# Patient Record
Sex: MALE | Race: WHITE | NOT HISPANIC OR LATINO | Employment: OTHER | URBAN - NONMETROPOLITAN AREA
[De-identification: names, ages, dates, MRNs, and addresses within clinical notes are randomized per-mention and may not be internally consistent; named-entity substitution may affect disease eponyms.]

---

## 2017-01-04 ENCOUNTER — TRANSFERRED RECORDS (OUTPATIENT)
Dept: HEALTH INFORMATION MANAGEMENT | Facility: HOSPITAL | Age: 62
End: 2017-01-04

## 2017-01-04 LAB
CHOLEST SERPL-MCNC: 154 MG/DL (ref 114–200)
HDLC SERPL-MCNC: 67 MG/DL (ref 40–60)
HEP C HIM: NORMAL
LDLC SERPL CALC-MCNC: 71 MG/DL
TRIGL SERPL-MCNC: 78 MG/DL (ref 10–200)

## 2019-08-01 ENCOUNTER — TRANSFERRED RECORDS (OUTPATIENT)
Dept: HEALTH INFORMATION MANAGEMENT | Facility: CLINIC | Age: 64
End: 2019-08-01

## 2019-10-21 PROBLEM — E66.3 OVERWEIGHT (BMI 25.0-29.9): Status: ACTIVE | Noted: 2019-04-10

## 2019-10-21 PROBLEM — N40.1 BPH ASSOCIATED WITH NOCTURIA: Status: ACTIVE | Noted: 2018-10-09

## 2019-10-21 PROBLEM — E78.5 HYPERLIPIDEMIA, UNSPECIFIED HYPERLIPIDEMIA TYPE: Status: ACTIVE | Noted: 2019-10-21

## 2019-10-21 PROBLEM — Z72.0 TOBACCO ABUSE: Status: ACTIVE | Noted: 2019-10-21

## 2019-10-21 PROBLEM — R35.1 BPH ASSOCIATED WITH NOCTURIA: Status: ACTIVE | Noted: 2018-10-09

## 2019-10-21 PROBLEM — J43.9 PULMONARY EMPHYSEMA, UNSPECIFIED EMPHYSEMA TYPE (H): Status: ACTIVE | Noted: 2019-10-21

## 2019-10-21 PROBLEM — I10 ESSENTIAL HYPERTENSION: Status: ACTIVE | Noted: 2019-10-21

## 2019-10-21 NOTE — PROGRESS NOTES
Subjective     Jaxson Ramirez is a 64 year old male who presents to clinic today for the following health issues:    HPI   New Patient/Transfer of Care  At this time, past medical history, current medications, allergies and drug sensitivities, immunizations, habits and life style, family history, and social history are reviewed and updated. Due for the influenza vaccine. Willing to get. Due for the Shingles vaccine. Would like to first check insurance coverage. Due for HIV screening. Declines. Due for a PSA. Willing to have done.     Due for aortic aneurysm screening. He has never had this and has smoked over 1 ppd times 50 years. Willing to get. No interest in quitting smoking.     Last colonoscopy in 2011 w/ 1 tubular adenoma - rec'd repeat in fall 2016. Declined, but did do FIT test which was negative. Recent did another FIT test with his insurance and notes that this was negative. He will bring in the results.     Hyperlipidemia Follow-Up      Are you having any of the following symptoms? (Select all that apply)  NONE-no chest pain, palpitaitons, dizziness, syncope, or lower leg edema. No calf pain. Some shortness of breath r/t his COPD. He notes that this has not worsened.     Are you regularly taking any medication or supplement to lower your cholesterol?   Yes- Crestor, started 2016    Are you having muscle aches or other side effects that you think could be caused by your cholesterol lowering medication?  No      Hypertension Follow-up      Do you check your blood pressure regularly outside of the clinic? No     Are you following a low salt diet? Yes no added salt     Are your blood pressures ever more than 140 on the top number (systolic) OR more   than 90 on the bottom number (diastolic), for example 140/90? Not checking blood pressure outside the clinic   -He currently takes metoprolol and losartan. Denies any side effects.  -He is drinking usually 4-6 beers a night on weekdays and on weekends often  "times up to 12 beers a night. No interest in quitting.   -He also smokes over 1 ppd. No interest in quitting.        How many servings of fruits and vegetables do you eat daily?  0-1    On average, how many sweetened beverages do you drink each day (soda, juice, sweet tea, etc)?   0    How many days per week do you miss taking your medication? Every once in a while , forgets to take       COPD - refuses to do PFT's. Using both Dulera and Breo. Was on Spiriva in the past, but he notes that insurance would no longer cover it. He has since switched to Medicare. He continues to smoke. No interest in quitting. He did have a low dose lung cancer screen in 10/2018. This was negative. Recommended annual screening. Declines today, but will most likely do it next year.     Patient notes that he has several \"blackheads\" on his back. He notes that they often itch. He has not applied anything to the area. Wonders what he can put on them to stop the itching. No fevers. No unintentional weight loss.     Patient Active Problem List   Diagnosis     Essential hypertension     Pulmonary emphysema, unspecified emphysema type (H)     Tobacco abuse     Hyperlipidemia, unspecified hyperlipidemia type     Alcohol consumption heavy     BPH associated with nocturia     Elevated fasting glucose     Overweight (BMI 25.0-29.9)     Past Surgical History:   Procedure Laterality Date     cataract right       layngoscopy  2009    layngoscopy     UVULECTOMY         Social History     Tobacco Use     Smoking status: Current Every Day Smoker     Packs/day: 1.00     Smokeless tobacco: Never Used   Substance Use Topics     Alcohol use: Yes     Comment: 6  pack per day     Family History   Problem Relation Age of Onset     Hypertension Brother      Cancer Brother         renal     Liver Cancer Mother      Parkinsonism Father      Bladder Cancer Father         smoker         Current Outpatient Medications   Medication Sig Dispense Refill     albuterol " "(PROAIR HFA/PROVENTIL HFA/VENTOLIN HFA) 108 (90 Base) MCG/ACT inhaler INHALE 2 PUFFS BY MOUTH EVERY 4 HOURS AS NEEDED FOR WHEEZING AND FOR SHORTNESS OF BREATH SHAKE BEFORE USING.  5     ASPIRIN PO Take 81 mg by mouth daily       fluticasone-vilanterol (BREO ELLIPTA) 100-25 MCG/INH inhaler Inhale 1 puff into the lungs       losartan (COZAAR) 100 MG tablet Take 100 mg by mouth       metoprolol succinate ER (TOPROL-XL) 200 MG 24 hr tablet Take 200 mg by mouth       rosuvastatin (CRESTOR) 20 MG tablet Take 20 mg by mouth       tiotropium (SPIRIVA) 18 MCG inhaled capsule Inhale 1 capsule (18 mcg) into the lungs daily 90 capsule 3     Allergies   Allergen Reactions     Ace Inhibitors      Tramadol Hcl Diarrhea     Vomiting  Ultram         Reviewed and updated as needed this visit by Provider  Tobacco  Med Hx  Surg Hx  Fam Hx         Review of Systems   As noted in the HPI.       Objective    /78 (BP Location: Left arm, Patient Position: Chair, Cuff Size: Adult Large)   Pulse 96   Temp 97.9  F (36.6  C) (Tympanic)   Ht 1.702 m (5' 7\")   Wt 74.4 kg (164 lb)   SpO2 96%   BMI 25.69 kg/m    Body mass index is 25.69 kg/m .  Physical Exam   GENERAL: healthy, alert and no distress  EYES: Eyes grossly normal to inspection, PERRL and conjunctivae and sclerae normal  HENT: ear canals and TM's normal, nose and mouth without ulcers or lesions  NECK: no adenopathy, no asymmetry, masses, or scars and thyroid normal to palpation  RESP: lungs clear to auscultation - no rales, rhonchi or wheezes  CV: regular rate and rhythm, normal S1 S2, no S3 or S4, no murmur, click or rub, no peripheral edema and peripheral pulses strong  SKIN: patient has over 30 inclusion cysts on back filled with thick black material.   PSYCH: mentation appears normal, affect normal/bright    Diagnostic Test Results:  none         Assessment & Plan   (Z76.89) Encounter to establish care  (primary encounter diagnosis)  Plan: Patient is in need of a new " provider. he has been explained the role of a CNP and the fact that I do not follow patients in the hospital. he was told that should he get admitted, he would then be followed by a hospitalist. he verbalizes understanding and would like to establish a relationship now.     (E78.5) Hyperlipidemia, unspecified hyperlipidemia type  Comment: tolerating Crestor  Plan: Comprehensive metabolic panel, Lipid Profile        Will continue. ALT/AST pending.     (I10) Essential hypertension  Plan: Well controlled. Continue current medications. Encouraged daily exercise and a low sodium diet. Recommended checking BP's 2x/wk, call the clinic if consistantly s>140 or d>90. Follow up in 6 months.       (J43.9) Pulmonary emphysema, unspecified emphysema type (H)  Comment: stable, but taking both Breo and Dulera.  Plan: tiotropium (SPIRIVA) 18 MCG inhaled capsule        Will stop the Dulera as his insurance will no longer cover it and try ordering Spiriva again as he now has medicare. F/u 4 weeks at  for reassessment.     (Z72.0) Tobacco abuse  (Z71.6) Tobacco abuse counseling  Plan: Cessation encouraged.     (Z11.4) Screening for HIV (human immunodeficiency virus)  Plan: HIV Antigen Antibody Combo        Will notify patient of the results when available and intervene accordingly.     (Z12.5) Screening for prostate cancer  Plan: PSA, screen        Will notify patient of the results when available and intervene accordingly.     (Z13.6) Screening for AAA (abdominal aortic aneurysm)  Plan: Abdominal Aortic Aneurysm Screening/Tracking,         US Abdominal Aorta Limited        Will notify patient of the results when available and intervene accordingly.     (Z87.891) History of smoking  Plan: US Abdominal Aorta Limited        Will notify patient of the results when available and intervene accordingly.     (F10.10) Alcohol abuse  Plan: Cessation encouraged. Will check LFTs at follow up. Will notify patient of the results when available  and intervene accordingly.     (Z23) Need for prophylactic vaccination and inoculation against influenza  Plan: INFLUENZA (HIGH DOSE) 3 VALENT VACCINE [36671]    (L72.0) Inclusion cyst  Comment: patient has many  Plan: Will discuss with derm and further address at .          Tobacco Cessation:   reports that he has been smoking. He has been smoking about 1.00 pack per day. He has never used smokeless tobacco.  Tobacco Cessation Action Plan: Information offered: Patient not interested at this time      Annette William NP  [facility] - [place]

## 2019-10-21 NOTE — PATIENT INSTRUCTIONS
HOW TO QUIT SMOKING  Smoking is one of the hardest habits to break. About half of all those who have ever smoked have been able to quit, and most of those (about 70%) who still smoke want to quit. Here are some of the best ways to stop smoking.     KEEP TRYING:  It takes most smokers about 8 tries before they are finally able to fully quit. So, the more often you try and fail, the better your chance of quitting the next time! So, don't give up!    GO COLD TURKEY:  Most ex-smokers quit cold turkey. Trying to cut back gradually doesn't seem to work as well, perhaps because it continues the smoking habit. Also, it is possible to fool yourself by inhaling more while smoking fewer cigarettes. This results in the same amount of nicotine in your body!    GET SUPPORT:  Support programs can make an important difference, especially for the heavy smoker. These groups offer lectures, methods to change your behavior and peer support. Call the free national Quitline for more information. 800-QUIT-NOW (121-451-6432). Low-cost or free programs are offered by many hospitals, local chapters of the American Lung Association (940-850-2157) and the American Cancer Society (510-499-3909). Support at home is important too. Non-smokers can help by offering praise and encouragement. If the smoker fails to quit, encourage them to try again!    OVER-THE-COUNTER MEDICINES:  For those who can't quit on their own, Nicotine Replacement Therapy (NRT) may make quitting much easier. Certain aids such as the nicotine patch, gum and lozenge are available without a prescription. However, it is best to use these under the guidance of your doctor. The skin patch provides a steady supply of nicotine to the body. Nicotine gum and lozenge gives temporary bursts of low levels of nicotine. Both methods take the edge off the craving for cigarettes. WARNING: If you feel symptoms of nicotine overdose, such as nausea, vomiting, dizziness, weakness, or fast  heartbeat, stop using these and see your doctor.    PRESCRIPTION MEDICINES:  After evaluating your smoking patterns and prior attempts at quitting, your doctor may offer a prescription medicine such as bupropion (Zyban, Wellbutrin), varenicline (Chantix, Champix), a niocotine inhaler or nasal spray. Each has its unique advantage and side effects which your doctor can review with you.    HEALTH BENEFITS OF QUITTING:  The benefits of quitting start right away and keep improving the longer you go without smokin minutes: blood pressure and pulse return to normal  8 hours: oxygen levels return to normal  2 days: ability to smell and taste begins to improve as damaged nerves start to regrow  2-3 weeks: circulation and lung function improves  1-9 months: decreased cough, congestion and shortness of breath; less tired  1 year: risk of heart attack decreases by half  5 years: risk of lung cancer decreases by half; risk of stroke becomes the same as a non-smoker  For information about how to quit smoking, visit the following links:  National [employer] [place] ,   Clearing the Air, Quit Smoking Today   - an online booklet. http://www.smokefree.gov/pubs/clearing_the_air.pdf  Smokefree.gov http://smokefree.gov/  QuitNet http://www.quitnet.com/    6446-7651 Cherelle Hudson, [address] [place] [address], [place] [address]. All rights reserved. This information is not intended as a substitute for professional medical care. Always follow your healthcare professional's instructions.    The Benefits of Living Smoke Free  What do you want to gain from quitting? Check off some reasons to quit.  Health Benefits  ___ Reduce my risk of lung cancer, heart disease, chronic lung disease  ___ Have fewer wrinkles and softer skin  ___ Improve my sense of taste and smell  ___ For pregnant women--reduce the risk of having a miscarriage, stillbirth, premature birth, or low-birth-weight baby  Personal Benefits  ___ Feel more in control of my  life  ___ Have better-smelling hair, breath, clothes, home, and car  ___ Save time by not having to take smoke breaks, buy cigarettes, or hunt for a light  ___ Have whiter teeth  Family Benefits  ___ Reduce my children s respiratory tract infections  ___ Set a good example for my children  ___ Reduce my family s cancer risk  Financial Benefits  ___ Save hundreds of dollars each year that would be spent on cigarettes  ___ Save money on medical bills  ___ Save on life, health, and car insurance premiums    Those Dollars Add Up!  Cigarettes are expensive, and getting more expensive all the time. Do you realize how much money you are spending on cigarettes per year? What is the average amount you spend on a pack of cigarettes? What is the average number of packs that you smoke per day? Using your answers to these questions, fill in this formula to help you find out:  ($ _____ per pack) ×  ( _____ number of packs per day) × (365 days) =  $ _____ yearly cost of smoking  Besides tobacco, there are other costs, including extra cleaning bills and replacement costs for clothing and furniture; medical expenses for smoking-related illnesses; and higher health, life, and car insurance premiums.    Cigars and Pipes Count Too!  Cigars and pipes are also dangerous. So are smokeless (chewing) tobacco and snuff. All of these products contain nicotine, a highly addictive substance that has harmful effects on your body. Quitting smoking means giving up all tobacco products.      6734-3699 [address], [place] [address]. All rights reserved. This information is not intended as a substitute for professional medical care. Always follow your healthcare professional's instructions.  Lung Cancer Screening   Frequently Asked Questions  If you are at high-risk for lung cancer, getting screened with low-dose computed tomography (LDCT) every year can help save your life. This handout offers answers to some of the most  common questions about lung cancer screening. If you have other questions, please call [phone] ([phone]).     What is it?  Lung cancer screening uses special X-ray technology to create an image of your lung tissue. The exam is quick and easy and takes less than 10 seconds. We don t give you any medicine or use any needles. You can eat before and after the exam. You don t need to change your clothes as long as the clothing on your chest doesn t contain metal. But, you do need to be able to hold your breath for at least 6 seconds during the exam.    What is the goal of lung cancer screening?  The goal of lung cancer screening is to save lives. Many times, lung cancer is not found until a person starts having physical symptoms. Lung cancer screening can help detect lung cancer in the earliest stages when it may be easier to treat.    Who should be screened for lung cancer?  We suggest lung cancer screening for anyone who is at high-risk for lung cancer. You are in the high-risk group if you:      are between the ages of 55 and 79, and    have smoked at least 1 pack of cigarettes a day for 30 or more years, and    still smoke or have quit within the past 15 years.    However, if you have a new cough or shortness of breath, you should talk to your doctor before being screened.    Some national lung health advocacy groups also recommend screening for people ages 50 to 79 who have smoked an average of 1 pack of cigarettes a day for 20 years. They must also have at least 1 other risk factor for lung cancer, not including exposure to secondhand smoke. Other risk factors are having had cancer in the past, emphysema, pulmonary fibrosis, COPD, a family history of lung cancer, or exposure to certain materials such as arsenic, asbestos, beryllium, cadmium, chromium, diesel fumes, nickel, radon or silica. Your care team can help you know if you have one of these risk factors.     Why does it matter if I have  symptoms?  Certain symptoms can be a sign that you have a condition in your lungs that should be checked and treated by your doctor. These symptoms include fever, chest pain, a new or changing cough, shortness of breath that you have never felt before, coughing up blood or unexplained weight loss. Having any of these symptoms can greatly affect the results of lung cancer screening.       Should all smokers get an LDCT lung cancer screening exam?  It depends. Lung cancer screening is for a very specific group of men and women who have a history of heavy smoking over a long period of time (see  Who should be screened for lung cancer  above).  I am in the high-risk group, but have been diagnosed with cancer in the past. Is LDCT lung cancer screening right for me?  In some cases, you should not have LDCT lung screening, such as when your doctor is already following your cancer with CT scan studies. Your doctor will help you decide if LDCT lung screening is right for you.  Do I need to have a screening exam every year?  Yes. If you are in the high-risk group described earlier, you should get an LDCT lung cancer screening exam every year until you are 79, or are no longer willing or able to undergo screening and possible procedures to diagnose and treat lung cancer.  How effective is LDCT at preventing death from lung cancer?  Studies have shown that LDCT lung cancer screening can lower the risk of death from lung cancer by 20 percent in people who are at high-risk.  What are the risks?  There are some risks and limitations of LDCT lung cancer screening. We want to make sure you understand the risks and benefits, so please let us know if you have any questions. Your doctor may want to talk with you more about these risks.    Radiation exposure: As with any exam that uses radiation, there is a very small increased risk of cancer. The amount of radiation in LDCT is small--about the same amount a person would get from a  mammogram. Your doctor orders the exam when he or she feels the potential benefits outweigh the risks.    False negatives: No test is perfect, including LDCT. It is possible that you may have a medical condition, including lung cancer, that is not found during your exam. This is called a false negative result.    False positives and more testing: LDCT very often finds something in the lung that could be cancer, but in fact is not. This is called a false positive result. False positive tests often cause anxiety. To make sure these findings are not cancer, you may need to have more tests. These tests will be done only if you give us permission. Sometimes patients need a treatment that can have side effects, such as a biopsy. For more information on false positives, see  What can I expect from the results?     Findings not related to lung cancer: Your LDCT exam also takes pictures of areas of your body next to your lungs. In a very small number of cases, the CT scan will show an abnormal finding in one of these areas, such as your kidneys, adrenal glands, liver or thyroid. This finding may not be serious, but you may need more tests. Your doctor can help you decide what other tests you may need, if any.  What can I expect from the results?  About 1 out of 4 LDCT exams will find something that may need more tests. Most of the time, these findings are lung nodules. Lung nodules are very small collections of tissue in the lung. These nodules are very common, and the vast majority--more than 97 percent--are not cancer (benign). Most are normal lymph nodes or small areas of scarring from past infections.  But, if a small lung nodule is found to be cancer, the cancer can be cured more than 90 percent of the time. To know if the nodule is cancer, we may need to get more images before your next yearly screening exam. If the nodule has suspicious features (for example, it is large, has an odd shape or grows over time), we will  refer you to a specialist for further testing.  Will my doctor also get the results?  Yes. Your doctor will get a copy of your results.  Is it okay to keep smoking now that there s a cancer screening exam?  No. Tobacco is one of the strongest cancer-causing agents. It causes not only lung cancer, but other cancers and cardiovascular (heart) diseases as well. The damage caused by smoking builds over time. This means that the longer you smoke, the higher your risk of disease. While it is never too late to quit, the sooner you quit, the better.  Where can I find help to quit smoking?  The best way to prevent lung cancer is to stop smoking. If you have already quit smoking, congratulations and keep it up! For help on quitting smoking, please call [employer] at 6-935-856-FFJF ([phone]) or the American Cancer Society at 1-146.786.4147 to find local resources near you.  One-on-one health coaching:  If you d prefer to work individually with a health care provider on tobacco cessation, we offer:      Medication Therapy Management:  Our specially trained pharmacists work closely with you and your doctor to help you quit smoking.  Call [phone] or [phone] (toll free).     Can Do: Health coaching offered by [facility].  [email or web address]-[email or web address]

## 2019-10-22 ENCOUNTER — TELEPHONE (OUTPATIENT)
Dept: FAMILY MEDICINE | Facility: OTHER | Age: 64
End: 2019-10-22

## 2019-10-22 ENCOUNTER — OFFICE VISIT (OUTPATIENT)
Dept: FAMILY MEDICINE | Facility: OTHER | Age: 64
End: 2019-10-22
Attending: NURSE PRACTITIONER
Payer: COMMERCIAL

## 2019-10-22 VITALS
HEART RATE: 96 BPM | HEIGHT: 67 IN | SYSTOLIC BLOOD PRESSURE: 132 MMHG | OXYGEN SATURATION: 96 % | DIASTOLIC BLOOD PRESSURE: 78 MMHG | WEIGHT: 164 LBS | TEMPERATURE: 97.9 F | BODY MASS INDEX: 25.74 KG/M2

## 2019-10-22 DIAGNOSIS — Z11.4 SCREENING FOR HIV (HUMAN IMMUNODEFICIENCY VIRUS): ICD-10-CM

## 2019-10-22 DIAGNOSIS — Z72.0 TOBACCO ABUSE: ICD-10-CM

## 2019-10-22 DIAGNOSIS — Z87.891 PERSONAL HISTORY OF TOBACCO USE: ICD-10-CM

## 2019-10-22 DIAGNOSIS — E78.5 HYPERLIPIDEMIA, UNSPECIFIED HYPERLIPIDEMIA TYPE: ICD-10-CM

## 2019-10-22 DIAGNOSIS — Z23 NEED FOR PROPHYLACTIC VACCINATION AND INOCULATION AGAINST INFLUENZA: ICD-10-CM

## 2019-10-22 DIAGNOSIS — Z12.5 SCREENING FOR PROSTATE CANCER: ICD-10-CM

## 2019-10-22 DIAGNOSIS — Z76.89 ENCOUNTER TO ESTABLISH CARE: Primary | ICD-10-CM

## 2019-10-22 DIAGNOSIS — Z71.6 TOBACCO ABUSE COUNSELING: ICD-10-CM

## 2019-10-22 DIAGNOSIS — F10.10 ALCOHOL ABUSE: ICD-10-CM

## 2019-10-22 DIAGNOSIS — I10 ESSENTIAL HYPERTENSION: ICD-10-CM

## 2019-10-22 DIAGNOSIS — Z87.891 HISTORY OF SMOKING: ICD-10-CM

## 2019-10-22 DIAGNOSIS — L72.0 INCLUSION CYST: ICD-10-CM

## 2019-10-22 DIAGNOSIS — J43.9 PULMONARY EMPHYSEMA, UNSPECIFIED EMPHYSEMA TYPE (H): ICD-10-CM

## 2019-10-22 DIAGNOSIS — Z13.6 SCREENING FOR AAA (ABDOMINAL AORTIC ANEURYSM): ICD-10-CM

## 2019-10-22 PROCEDURE — 99214 OFFICE O/P EST MOD 30 MIN: CPT | Performed by: NURSE PRACTITIONER

## 2019-10-22 PROCEDURE — G0008 ADMIN INFLUENZA VIRUS VAC: HCPCS

## 2019-10-22 PROCEDURE — G0463 HOSPITAL OUTPT CLINIC VISIT: HCPCS | Mod: 25

## 2019-10-22 PROCEDURE — 90662 IIV NO PRSV INCREASED AG IM: CPT

## 2019-10-22 PROCEDURE — G0463 HOSPITAL OUTPT CLINIC VISIT: HCPCS

## 2019-10-22 RX ORDER — TIOTROPIUM BROMIDE 18 UG/1
18 CAPSULE ORAL; RESPIRATORY (INHALATION) DAILY
Qty: 90 CAPSULE | Refills: 3 | Status: SHIPPED | OUTPATIENT
Start: 2019-10-22 | End: 2020-07-20

## 2019-10-22 RX ORDER — LOSARTAN POTASSIUM 100 MG/1
100 TABLET ORAL
COMMUNITY
Start: 2019-05-02 | End: 2019-11-19

## 2019-10-22 RX ORDER — ROSUVASTATIN CALCIUM 20 MG/1
20 TABLET, COATED ORAL
COMMUNITY
Start: 2018-10-09 | End: 2019-11-19

## 2019-10-22 RX ORDER — ALBUTEROL SULFATE 90 UG/1
AEROSOL, METERED RESPIRATORY (INHALATION)
Refills: 5 | COMMUNITY
Start: 2019-07-16 | End: 2019-11-19

## 2019-10-22 RX ORDER — METOPROLOL SUCCINATE 200 MG/1
200 TABLET, EXTENDED RELEASE ORAL
COMMUNITY
Start: 2018-10-09 | End: 2019-11-19

## 2019-10-22 ASSESSMENT — ANXIETY QUESTIONNAIRES
GAD7 TOTAL SCORE: 3
7. FEELING AFRAID AS IF SOMETHING AWFUL MIGHT HAPPEN: SEVERAL DAYS
3. WORRYING TOO MUCH ABOUT DIFFERENT THINGS: SEVERAL DAYS
2. NOT BEING ABLE TO STOP OR CONTROL WORRYING: SEVERAL DAYS
6. BECOMING EASILY ANNOYED OR IRRITABLE: NOT AT ALL
5. BEING SO RESTLESS THAT IT IS HARD TO SIT STILL: NOT AT ALL
1. FEELING NERVOUS, ANXIOUS, OR ON EDGE: NOT AT ALL
IF YOU CHECKED OFF ANY PROBLEMS ON THIS QUESTIONNAIRE, HOW DIFFICULT HAVE THESE PROBLEMS MADE IT FOR YOU TO DO YOUR WORK, TAKE CARE OF THINGS AT HOME, OR GET ALONG WITH OTHER PEOPLE: SOMEWHAT DIFFICULT

## 2019-10-22 ASSESSMENT — MIFFLIN-ST. JEOR: SCORE: 1487.53

## 2019-10-22 ASSESSMENT — PAIN SCALES - GENERAL: PAINLEVEL: NO PAIN (0)

## 2019-10-22 ASSESSMENT — PATIENT HEALTH QUESTIONNAIRE - PHQ9
SUM OF ALL RESPONSES TO PHQ QUESTIONS 1-9: 3
5. POOR APPETITE OR OVEREATING: NOT AT ALL

## 2019-10-22 NOTE — LETTER
My COPD Action Plan     Name: Jaxson Ramirez    YOB: 1955   Date: 10/21/2019    My doctor: Annette William NP   My clinic: [facility] [place]    [facility] [address] [place] [address]  [phone]  My Controller Medicine: Tiotropium (Spiriva)  Vilanterol/fluticasone (Breo Ellipta)       My Rescue Medicine: Albuterol (Proair/Ventolin/Proventil) inhaler         My COPD Severity: unknown, declines PFTs      Use of Oxygen: Oxygen Not Prescribed      Make sure you've had your pneumonia   vaccines.          GREEN ZONE       Doing well today      Usual level of activity and exercise    Usual amount of cough and mucus    No shortness of breath    Usual level of health (thinking clearly, sleeping well, feel like eating) Actions:      Take daily medicines    Use oxygen as prescribed    Follow regular exercise and diet plan    Avoid cigarette smoke and other irritants that harm the lungs           YELLOW ZONE          Having a bad day or flare up      Short of breath more than usual    A lot more sputum (mucus) than usual    Sputum looks yellow, green, tan, brown or bloody    More coughing or wheezing    Fever or chills    Less energy; trouble completing activities    Trouble thinking or focusing    Using quick relief inhaler or nebulizer more often    Poor sleep; symptoms wake me up    Do not feel like eating Actions:      Get plenty of rest    Take daily medicines    Use quick relief inhaler every 4   hours    If you use oxygen, call you doctor to see if you should adjust your oxygen    Do breathing exercises or other things to help you relax    Let a loved one, friend or neighbor know you are feeling worse    Call your care team if you have 2 or more symptoms.  Start taking steroids or antibiotics if directed by your care team           RED ZONE       Need medical care now      Severe shortness of breath (feel you can't breathe)    Fever, chills    Not enough breath to do any  activity    Trouble coughing up mucus, walking or talking    Blood in mucus    Frequent coughing   Rescue medicines are not working    Not able to sleep because of breathing    Feel confused or drowsy    Chest pain    Actions:      Call your health care team.  If you cannot reach your care team, call 911 or go to the emergency room.        Annual Reminders:  Meet with Care Team, Flu Shot every Fall  Pharmacy: [id number] - [place], [place] - [phone] [phone]

## 2019-10-22 NOTE — TELEPHONE ENCOUNTER
Received an APPROVAL from [employer] for Spiriva Handihaler 18 mcg capsules. Effective 12/30/2018 to 12/31/2019. Forms scanned to [facility].

## 2019-10-22 NOTE — TELEPHONE ENCOUNTER
Received a PA from [employer] for Spiriva Handihaler 18 mcg capsules. Submitted on CMM. Waiting for a response.

## 2019-10-22 NOTE — NURSING NOTE
"Chief Complaint   Patient presents with     Establish Care       Initial BP (!) 142/82 (BP Location: Left arm, Patient Position: Chair, Cuff Size: Adult Large)   Pulse 96   Temp 97.9  F (36.6  C) (Tympanic)   Ht 1.702 m (5' 7\")   Wt 74.4 kg (164 lb)   SpO2 96%   BMI 25.69 kg/m   Estimated body mass index is 25.69 kg/m  as calculated from the following:    Height as of this encounter: 1.702 m (5' 7\").    Weight as of this encounter: 74.4 kg (164 lb).  Medication Reconciliation: complete  Francesca Ceballos LPN  "

## 2019-10-23 ASSESSMENT — ANXIETY QUESTIONNAIRES: GAD7 TOTAL SCORE: 3

## 2019-10-24 ENCOUNTER — HOSPITAL ENCOUNTER (OUTPATIENT)
Dept: ULTRASOUND IMAGING | Facility: HOSPITAL | Age: 64
Discharge: HOME | End: 2019-10-24
Attending: NURSE PRACTITIONER | Admitting: NURSE PRACTITIONER
Payer: COMMERCIAL

## 2019-10-24 DIAGNOSIS — Z13.6 SCREENING FOR AAA (ABDOMINAL AORTIC ANEURYSM): ICD-10-CM

## 2019-10-24 DIAGNOSIS — Z87.891 HISTORY OF SMOKING: ICD-10-CM

## 2019-10-24 PROCEDURE — 76706 US ABDL AORTA SCREEN AAA: CPT | Mod: TC

## 2019-11-15 NOTE — PROGRESS NOTES
"SUBJECTIVE:   Jasxon Ramirez is a 64 year old male who presents for Preventive Visit.    Are you in the first 12 months of your Medicare Part B coverage?  Yes,  Visual Acuity:  Right Eye: 20/25   Left Eye: 20/30  Both Eyes: 20/20     Physical Health:    In general, how would you rate your overall physical health? fair    Outside of work, how many days during the week do you exercise? 2-3 days/week    Outside of work, approximately how many minutes a day do you exercise?15-30 minutes  If you drink alcohol do you typically have >3 drinks per day or >7 drinks per week? Yes - AUDIT SCORE: 20  20, patient aware that he drinks too much alcohol and should stop, typically drinks 4-6 cans of beer per night    Do you usually eat at least 4 servings of fruit and vegetables a day, include whole grains & fiber and avoid regularly eating high fat or \"junk\" foods? Yes    Do you have any problems taking medications regularly?  No    Do you have any side effects from medications? no     Needs assistance for the following daily activities: no assistance needed    Which of the following safety concerns are present in your home?  none identified     Hearing impairment: No    In the past 6 months, have you been bothered by leaking of urine? yes    Mental Health:    In general, how would you rate your overall mental or emotional health? good  PHQ-2 Score:      Do you feel safe in your environment? Yes    Have you ever done Advance Care Planning? (For example, a Health Directive, POLST, or a discussion with a medical provider or your loved ones about your wishes): No, advance care planning information given to patient to review.  Patient plans to discuss their wishes with loved ones or provider.      Additional concerns to address?  No    Fall risk:  Fallen 2 or more times in the past year?: No  Any fall with injury in the past year?: No  click delete button to remove this line now    Cognitive Screenin) Repeat 3 items (Leader, " Season, Table)    2) Clock draw: NORMAL  3) 3 item recall: Recalls 2 objects   Results: NORMAL clock, 1-2 items recalled: COGNITIVE IMPAIRMENT LESS LIKELY    Mini-CogTM Copyright S Kya. Licensed by the author for use in [facility]; reprinted with permission (remedios@[facility]). All rights reserved.      Do you have sleep apnea, excessive snoring or daytime drowsiness?: yes snoring, also some daytime fatigue, declines sleep study     COPD - refuses to do PFT's. Using Breo and Spiriva which was started at his last visit. He continues to smoke. No interest in quitting. He did have a low dose lung cancer screen in 10/2018. This was negative. Recommended annual screening. Wishing to repeat today. No cough or cold like symptoms.     Patient has several inclusion cysts on back that itch. Also some psoriasis. Wondering wheat can be done. No personal or family history of skin cancer.     Reviewed and updated as needed this visit by clinical staff  Tobacco  Allergies  Meds         Reviewed and updated as needed this visit by Provider        Social History     Tobacco Use     Smoking status: Current Every Day Smoker     Packs/day: 1.00     Smokeless tobacco: Never Used   Substance Use Topics     Alcohol use: Yes     Comment: 6  pack per day                           Current providers sharing in care for this patient include:   Patient Care Team:  Annette William, NP as PCP - General (Family Practice)    The following health maintenance items are reviewed in Epic and correct as of today:  Health Maintenance   Topic Date Due     SPIROMETRY  1955     ADVANCE CARE PLANNING  1955     COLONOSCOPY  07/20/1964     HIV SCREENING  07/20/1969     ZOSTER IMMUNIZATION (2 of 3) 11/24/2014     MEDICARE ANNUAL WELLNESS VISIT  07/20/2019     PNEUMOCOCCAL IMMUNIZATION 65+ LOW/MEDIUM RISK (1 of 2 - PCV13) 07/20/2019     FALL RISK ASSESSMENT  10/22/2020     LIPID  01/04/2022     DTAP/TDAP/TD IMMUNIZATION (3 - Td)  10/09/2028     HEPATITIS C SCREENING  Completed     COPD ACTION PLAN  Completed     PHQ-2  Completed     INFLUENZA VACCINE  Completed     AORTIC ANEURYSM SCREENING (SYSTEM ASSIGNED)  Completed     IPV IMMUNIZATION  Aged Out     MENINGITIS IMMUNIZATION  Aged Out     BP Readings from Last 3 Encounters:   11/19/19 (!) 152/82   10/22/19 132/78   08/12/15 150/89    Wt Readings from Last 3 Encounters:   11/19/19 74.8 kg (165 lb)   10/22/19 74.4 kg (164 lb)   11/07/13 78 kg (172 lb)                  Patient Active Problem List   Diagnosis     Essential hypertension     Pulmonary emphysema, unspecified emphysema type (H)     Tobacco abuse     Hyperlipidemia, unspecified hyperlipidemia type     Alcohol consumption heavy     BPH associated with nocturia     Elevated fasting glucose     Overweight (BMI 25.0-29.9)     Past Surgical History:   Procedure Laterality Date     cataract right       layngoscopy  2009    layngoscopy     UVULECTOMY         Social History     Tobacco Use     Smoking status: Current Every Day Smoker     Packs/day: 1.00     Smokeless tobacco: Never Used   Substance Use Topics     Alcohol use: Yes     Comment: 6  pack per day     Family History   Problem Relation Age of Onset     Hypertension Brother      Cancer Brother         renal     Liver Cancer Mother      Parkinsonism Father      Bladder Cancer Father         smoker         Current Outpatient Medications   Medication Sig Dispense Refill     albuterol (PROAIR HFA/PROVENTIL HFA/VENTOLIN HFA) 108 (90 Base) MCG/ACT inhaler INHALE 2 PUFFS BY MOUTH EVERY 4 HOURS AS NEEDED FOR WHEEZING AND FOR SHORTNESS OF BREATH SHAKE BEFORE USING. 2 Inhaler 5     amLODIPine (NORVASC) 5 MG tablet Take 1 tablet (5 mg) by mouth daily 30 tablet 1     ASPIRIN PO Take 81 mg by mouth daily       fluticasone-vilanterol (BREO ELLIPTA) 100-25 MCG/INH inhaler Inhale 1 puff into the lungs daily 2 Inhaler 3     losartan (COZAAR) 100 MG tablet Take 1 tablet (100 mg) by mouth daily 90  "tablet 1     metoprolol succinate ER (TOPROL-XL) 200 MG 24 hr tablet Take 1 tablet (200 mg) by mouth daily 90 tablet 1     rosuvastatin (CRESTOR) 20 MG tablet Take 1 tablet (20 mg) by mouth daily 90 tablet 3     tiotropium (SPIRIVA) 18 MCG inhaled capsule Inhale 1 capsule (18 mcg) into the lungs daily 90 capsule 3     triamcinolone (KENALOG) 0.1 % external cream Apply topically 2 times daily 453 g 0     Declines Colonoscopy. Recent FIT test negative-done through insurance. He did bring in a copy today.      ROS:  CONSTITUTIONAL: NEGATIVE for fever, chills, change in weight  INTEGUMENTARY/SKIN: NEGATIVE for worrisome rashes, moles or lesions, some pruritic cysts on back  EYES: NEGATIVE for vision changes or irritation  ENT/MOUTH: NEGATIVE for ear, mouth and throat problems  RESP: NEGATIVE for significant cough or SOB  CV: NEGATIVE for chest pain, palpitations or peripheral edema  GI: NEGATIVE for nausea, abdominal pain, heartburn, or change in bowel habits  : NEGATIVE for frequency, dysuria, or hematuria  MUSCULOSKELETAL: NEGATIVE for significant arthralgias or myalgia  NEURO: NEGATIVE for weakness, dizziness or paresthesias  ENDOCRINE: NEGATIVE for temperature intolerance, skin/hair changes  HEME: NEGATIVE for bleeding problems  PSYCHIATRIC: NEGATIVE for changes in mood or affect    OBJECTIVE:   BP (!) 152/82 (BP Location: Left arm, Patient Position: Chair, Cuff Size: Adult Large)   Pulse 81   Temp 98  F (36.7  C) (Tympanic)   Ht 1.613 m (5' 3.5\")   Wt 74.8 kg (165 lb)   SpO2 94%   BMI 28.77 kg/m   Estimated body mass index is 28.77 kg/m  as calculated from the following:    Height as of this encounter: 1.613 m (5' 3.5\").    Weight as of this encounter: 74.8 kg (165 lb).  EXAM:   GENERAL: healthy, alert and no distress  EYES: Eyes grossly normal to inspection, PERRL and conjunctivae and sclerae normal  HENT: ear canals and TM's normal, nose and mouth without ulcers or lesions  NECK: no adenopathy, no " asymmetry, masses, or scars and thyroid normal to palpation  RESP: lungs clear to auscultation - no rales, rhonchi or wheezes  CV: regular rate and rhythm, normal S1 S2, no S3 or S4, no murmur, click or rub, no peripheral edema and peripheral pulses strong  ABDOMEN: soft, nontender, no hepatosplenomegaly, no masses and bowel sounds normal  MS: no gross musculoskeletal defects noted, no edema  SKIN: patient has many inclusion cysts on back with several psoriatic patches, some some psoriatic patches on legs  NEURO: Normal strength and tone, mentation intact and speech normal  PSYCH: mentation appears normal, affect normal/bright        Diagnostic Test Results:  Labs reviewed in Epic  Results for orders placed or performed in visit on 11/19/19 (from the past 24 hour(s))   PSA, screen   Result Value Ref Range    PSA 0.51 0 - 4 ug/L   Lipid Profile   Result Value Ref Range    Cholesterol 179 <200 mg/dL    Triglycerides 79 <150 mg/dL    HDL Cholesterol 76 >39 mg/dL    LDL Cholesterol Calculated 87 <100 mg/dL    Non HDL Cholesterol 103 <130 mg/dL   Comprehensive metabolic panel   Result Value Ref Range    Sodium 134 133 - 144 mmol/L    Potassium 4.3 3.4 - 5.3 mmol/L    Chloride 99 94 - 109 mmol/L    Carbon Dioxide 29 20 - 32 mmol/L    Anion Gap 6 3 - 14 mmol/L    Glucose 126 (H) 70 - 99 mg/dL    Urea Nitrogen 7 7 - 30 mg/dL    Creatinine 0.55 (L) 0.66 - 1.25 mg/dL    GFR Estimate >90 >60 mL/min/[1.73_m2]    GFR Estimate If Black >90 >60 mL/min/[1.73_m2]    Calcium 9.4 8.5 - 10.1 mg/dL    Bilirubin Total 0.7 0.2 - 1.3 mg/dL    Albumin 4.1 3.4 - 5.0 g/dL    Protein Total 7.8 6.8 - 8.8 g/dL    Alkaline Phosphatase 73 40 - 150 U/L    ALT 41 0 - 70 U/L    AST 26 0 - 45 U/L       ASSESSMENT / PLAN:   (Z00.00) Routine general medical examination at a health care facility  (primary encounter diagnosis)  Plan: Declines colonoscopy. Will update pneumococcal vaccine in 2 weeks. F/u yearly.     (J43.9) Pulmonary emphysema, unspecified  emphysema type (H)  Comment: Controlled, much better with Spiriva  Plan: fluticasone-vilanterol (BREO ELLIPTA) 100-25         MCG/INH inhaler, albuterol (PROAIR         HFA/PROVENTIL HFA/VENTOLIN HFA) 108 (90 Base)         MCG/ACT inhaler        Continue current inhalers. Declines PFTs. F/u 6 months.     (Z72.0) Tobacco abuse  Plan: CT Chest Lung Cancer Scrn Low Dose wo        Cessation encouraged. Low Dose Chest CT ordered. Will notify patient of the results when available and intervene accordingly.       (I10) Essential hypertension  Plan: BP high today. Will continue losartan and metoprolol, but add amlodipine. F/u 2 weeks for reassessment. Encouraged diet and exercise, a low sodium diet, and alcohol cessation.     (Z78.9) Alcohol consumption heavy  Plan: Encouraged to stop.     (L72.0) Inclusion cyst  Plan: DERMATOLOGY REFERRAL        Many cysts on back that are pruritic. Will refer to derm for assessment and management.     (R73.9) Hyperglycemia  Plan: Hemoglobin A1c        A1C 5.7. Reviewed pathogenesis of pre-diabetes. Stressed importance of cutting out sugars/carbs and engaging in routine physical exercise for 30 minutes/5 days of work with the goal of gradual weight loss.    (E78.5) Hyperlipidemia, unspecified hyperlipidemia type  Plan: rosuvastatin (CRESTOR) 20 MG tablet        Tolerating statin. Will continue.     (L40.9) Psoriasis  Comment: patches on back and legs appear like psoriasis.   Plan: triamcinolone (KENALOG) 0.1 % external cream        Will try kenalog cream with good moisturizer. F/u in the clinic if it does not help.     (R40.0) Daytime sleepiness  (R06.83) Snores  Comment: most likely sleep apnea  Plan: Declines sleep study. Will let me know if he changes his mind.     COUNSELING:  Reviewed preventive health counseling, as reflected in patient instructions       Regular exercise       Healthy diet/nutrition       Vision screening       Hearing screening       Dental care       Alcohol Use    "    Consider lung cancer screening for ages 55-80 years and 30 pack-year smoking history     Estimated body mass index is 28.77 kg/m  as calculated from the following:    Height as of this encounter: 1.613 m (5' 3.5\").    Weight as of this encounter: 74.8 kg (165 lb).         reports that he has been smoking. He has been smoking about 1.00 pack per day. He has never used smokeless tobacco.  Tobacco Cessation Action Plan: Information offered: Patient not interested at this time    Appropriate preventive services were discussed with this patient, including applicable screening as appropriate for cardiovascular disease, diabetes, osteopenia/osteoporosis, and glaucoma.  As appropriate for age/gender, discussed screening for colorectal cancer, prostate cancer, breast cancer, and cervical cancer. Checklist reviewing preventive services available has been given to the patient.    Reviewed patients plan of care and provided an AVS. The Basic Care Plan (routine screening as documented in Health Maintenance) for Jaxson meets the Care Plan requirement. This Care Plan has been established and reviewed with the Patient.    Counseling Resources:  ATP IV Guidelines  Pooled Cohorts Equation Calculator  Breast Cancer Risk Calculator  FRAX Risk Assessment  ICSI Preventive Guidelines  Dietary Guidelines for Americans, 2010  [employer]'s MyPlate  ASA Prophylaxis  Lung CA Screening    Annette William, VITA  [facility] [place]  Lung Cancer Screening Shared Decision Making Visit     Jaxson Ramirez is eligible for lung cancer screening on the basis of the information provided in my signed lung cancer screening order.     I have discussed with patient the risks and benefits of screening for lung cancer with low-dose CT.     The risks include:  radiation exposure: one low dose chest CT has as much ionizing radiation as about 15 chest x-rays or 6 months of background radiation living in [place]    false positives: 96% of positive " findings/nodules are NOT cancer, but some might still require additional diagnostic evaluation, including biopsy  over-diagnosis: some slow growing cancers that might never have been clinically significant will be detected and treated unnecessarily     The benefit of early detection of lung cancer is contingent upon adherence to annual screening or more frequent follow up if indicated.     Furthermore, reaping the benefits of screening requires Jaxson Ramirez to be willing and physically able to undergo diagnostic procedures, if indicated. Although no specific guide is available for determining severity of comorbidities, it is reasonable to withhold screening in patients who have greater mortality risk from other diseases.     We did discuss that the only way to prevent lung cancer is to not smoke. Smoking cessation assistance was offered.    I did not offer risk estimation using a calculator such as this one:    ShouldIScreen

## 2019-11-15 NOTE — PATIENT INSTRUCTIONS
Preventive Health Recommendations:     See your health care provider every year to    Review health changes.     Discuss preventive care.      Review your medicines if your doctor has prescribed any.      Talk with your health care provider about whether you should have a test to screen for prostate cancer (PSA).    Every 3 years, have a diabetes test (fasting glucose). If you are at risk for diabetes, you should have this test more often.    Every 5 years, have a cholesterol test. Have this test more often if you are at risk for high cholesterol or heart disease.     Every 10 years, have a colonoscopy. Or, have a yearly FIT test (stool test). These exams will check for colon cancer.    Talk to with your health care provider about screening for Abdominal Aortic Aneurysm if you have a family history of AAA or have a history of smoking.    Shots:     Get a flu shot each year.     Get a tetanus shot every 10 years.     Talk to your doctor about your pneumonia vaccines. There are now two you should receive - Pneumovax (PPSV 23) and Prevnar (PCV 13).     Talk to your pharmacist about a shingles vaccine.     Talk to your doctor about the hepatitis B vaccine.  Nutrition:     Eat at least 5 servings of fruits and vegetables each day.     Eat whole-grain bread, whole-wheat pasta and brown rice instead of white grains and rice.     Get adequate Calcium and Vitamin D.   Lifestyle    Exercise for at least 150 minutes a week (30 minutes a day, 5 days a week). This will help you control your weight and prevent disease.     Limit alcohol to one drink per day.     No smoking.     Wear sunscreen to prevent skin cancer.    See your dentist every six months for an exam and cleaning.    See your eye doctor every 1 to 2 years to screen for conditions such as glaucoma, macular degeneration, cataracts, etc.    Personalized Prevention Plan  You are due for the preventive services outlined below.  Your care team is available to assist you  in scheduling these services.  If you have already completed any of these items, please share that information with your care team to update in your medical record.  Health Maintenance Due   Topic Date Due     Breathing Capacity Test  1955     Discuss Advance Care Planning  1955     Colonoscopy  07/20/1964     HIV Screening  07/20/1969     Zoster (Shingles) Vaccine (2 of 3) 11/24/2014     Annual Wellness Visit  07/20/2019     Pneumococcal Vaccine (1 of 2 - PCV13) 07/20/2019     Lung Cancer Screening   Frequently Asked Questions  If you are at high-risk for lung cancer, getting screened with low-dose computed tomography (LDCT) every year can help save your life. This handout offers answers to some of the most common questions about lung cancer screening. If you have other questions, please call [phone] ([phone]).     What is it?  Lung cancer screening uses special X-ray technology to create an image of your lung tissue. The exam is quick and easy and takes less than 10 seconds. We don t give you any medicine or use any needles. You can eat before and after the exam. You don t need to change your clothes as long as the clothing on your chest doesn t contain metal. But, you do need to be able to hold your breath for at least 6 seconds during the exam.    What is the goal of lung cancer screening?  The goal of lung cancer screening is to save lives. Many times, lung cancer is not found until a person starts having physical symptoms. Lung cancer screening can help detect lung cancer in the earliest stages when it may be easier to treat.    Who should be screened for lung cancer?  We suggest lung cancer screening for anyone who is at high-risk for lung cancer. You are in the high-risk group if you:      are between the ages of 55 and 79, and    have smoked at least 1 pack of cigarettes a day for 30 or more years, and    still smoke or have quit within the past 15 years.    However, if you have  a new cough or shortness of breath, you should talk to your doctor before being screened.    Some national lung health advocacy groups also recommend screening for people ages 50 to 79 who have smoked an average of 1 pack of cigarettes a day for 20 years. They must also have at least 1 other risk factor for lung cancer, not including exposure to secondhand smoke. Other risk factors are having had cancer in the past, emphysema, pulmonary fibrosis, COPD, a family history of lung cancer, or exposure to certain materials such as arsenic, asbestos, beryllium, cadmium, chromium, diesel fumes, nickel, radon or silica. Your care team can help you know if you have one of these risk factors.     Why does it matter if I have symptoms?  Certain symptoms can be a sign that you have a condition in your lungs that should be checked and treated by your doctor. These symptoms include fever, chest pain, a new or changing cough, shortness of breath that you have never felt before, coughing up blood or unexplained weight loss. Having any of these symptoms can greatly affect the results of lung cancer screening.       Should all smokers get an LDCT lung cancer screening exam?  It depends. Lung cancer screening is for a very specific group of men and women who have a history of heavy smoking over a long period of time (see  Who should be screened for lung cancer  above).  I am in the high-risk group, but have been diagnosed with cancer in the past. Is LDCT lung cancer screening right for me?  In some cases, you should not have LDCT lung screening, such as when your doctor is already following your cancer with CT scan studies. Your doctor will help you decide if LDCT lung screening is right for you.  Do I need to have a screening exam every year?  Yes. If you are in the high-risk group described earlier, you should get an LDCT lung cancer screening exam every year until you are 79, or are no longer willing or able to undergo screening and  possible procedures to diagnose and treat lung cancer.  How effective is LDCT at preventing death from lung cancer?  Studies have shown that LDCT lung cancer screening can lower the risk of death from lung cancer by 20 percent in people who are at high-risk.  What are the risks?  There are some risks and limitations of LDCT lung cancer screening. We want to make sure you understand the risks and benefits, so please let us know if you have any questions. Your doctor may want to talk with you more about these risks.    Radiation exposure: As with any exam that uses radiation, there is a very small increased risk of cancer. The amount of radiation in LDCT is small--about the same amount a person would get from a mammogram. Your doctor orders the exam when he or she feels the potential benefits outweigh the risks.    False negatives: No test is perfect, including LDCT. It is possible that you may have a medical condition, including lung cancer, that is not found during your exam. This is called a false negative result.    False positives and more testing: LDCT very often finds something in the lung that could be cancer, but in fact is not. This is called a false positive result. False positive tests often cause anxiety. To make sure these findings are not cancer, you may need to have more tests. These tests will be done only if you give us permission. Sometimes patients need a treatment that can have side effects, such as a biopsy. For more information on false positives, see  What can I expect from the results?     Findings not related to lung cancer: Your LDCT exam also takes pictures of areas of your body next to your lungs. In a very small number of cases, the CT scan will show an abnormal finding in one of these areas, such as your kidneys, adrenal glands, liver or thyroid. This finding may not be serious, but you may need more tests. Your doctor can help you decide what other tests you may need, if any.  What can  I expect from the results?  About 1 out of 4 LDCT exams will find something that may need more tests. Most of the time, these findings are lung nodules. Lung nodules are very small collections of tissue in the lung. These nodules are very common, and the vast majority--more than 97 percent--are not cancer (benign). Most are normal lymph nodes or small areas of scarring from past infections.  But, if a small lung nodule is found to be cancer, the cancer can be cured more than 90 percent of the time. To know if the nodule is cancer, we may need to get more images before your next yearly screening exam. If the nodule has suspicious features (for example, it is large, has an odd shape or grows over time), we will refer you to a specialist for further testing.  Will my doctor also get the results?  Yes. Your doctor will get a copy of your results.  Is it okay to keep smoking now that there s a cancer screening exam?  No. Tobacco is one of the strongest cancer-causing agents. It causes not only lung cancer, but other cancers and cardiovascular (heart) diseases as well. The damage caused by smoking builds over time. This means that the longer you smoke, the higher your risk of disease. While it is never too late to quit, the sooner you quit, the better.  Where can I find help to quit smoking?  The best way to prevent lung cancer is to stop smoking. If you have already quit smoking, congratulations and keep it up! For help on quitting smoking, please call [employer] at 1-930-247-TPJT ([phone]) or the American [employer] Society at 1-231.238.9394 to find local resources near you.  One-on-one health coaching:  If you d prefer to work individually with a health care provider on tobacco cessation, we offer:      Medication Therapy Management:  Our specially trained pharmacists work closely with you and your doctor to help you quit smoking.  Call [phone] or [phone] (toll free).     Can Do: Health coaching offered by [facility].  [email or web address]

## 2019-11-19 ENCOUNTER — OFFICE VISIT (OUTPATIENT)
Dept: FAMILY MEDICINE | Facility: OTHER | Age: 64
End: 2019-11-19
Attending: NURSE PRACTITIONER
Payer: COMMERCIAL

## 2019-11-19 VITALS
HEIGHT: 64 IN | SYSTOLIC BLOOD PRESSURE: 152 MMHG | DIASTOLIC BLOOD PRESSURE: 82 MMHG | BODY MASS INDEX: 28.17 KG/M2 | OXYGEN SATURATION: 94 % | HEART RATE: 81 BPM | TEMPERATURE: 98 F | WEIGHT: 165 LBS

## 2019-11-19 DIAGNOSIS — Z72.0 TOBACCO ABUSE: ICD-10-CM

## 2019-11-19 DIAGNOSIS — R73.9 HYPERGLYCEMIA: ICD-10-CM

## 2019-11-19 DIAGNOSIS — Z00.00 ROUTINE GENERAL MEDICAL EXAMINATION AT A HEALTH CARE FACILITY: Primary | ICD-10-CM

## 2019-11-19 DIAGNOSIS — Z78.9 ALCOHOL CONSUMPTION HEAVY: ICD-10-CM

## 2019-11-19 DIAGNOSIS — I10 ESSENTIAL HYPERTENSION: ICD-10-CM

## 2019-11-19 DIAGNOSIS — J43.9 PULMONARY EMPHYSEMA, UNSPECIFIED EMPHYSEMA TYPE (H): ICD-10-CM

## 2019-11-19 DIAGNOSIS — Z11.4 SCREENING FOR HIV (HUMAN IMMUNODEFICIENCY VIRUS): ICD-10-CM

## 2019-11-19 DIAGNOSIS — E78.5 HYPERLIPIDEMIA, UNSPECIFIED HYPERLIPIDEMIA TYPE: ICD-10-CM

## 2019-11-19 DIAGNOSIS — R40.0 DAYTIME SLEEPINESS: ICD-10-CM

## 2019-11-19 DIAGNOSIS — Z87.891 PERSONAL HISTORY OF NICOTINE DEPENDENCE: ICD-10-CM

## 2019-11-19 DIAGNOSIS — L40.9 PSORIASIS: ICD-10-CM

## 2019-11-19 DIAGNOSIS — Z87.891 PERSONAL HISTORY OF TOBACCO USE: ICD-10-CM

## 2019-11-19 DIAGNOSIS — L72.0 INCLUSION CYST: ICD-10-CM

## 2019-11-19 DIAGNOSIS — Z12.5 SCREENING FOR PROSTATE CANCER: ICD-10-CM

## 2019-11-19 DIAGNOSIS — R06.83 SNORES: ICD-10-CM

## 2019-11-19 LAB
ALBUMIN SERPL-MCNC: 4.1 G/DL (ref 3.4–5)
ALP SERPL-CCNC: 73 U/L (ref 40–150)
ALT SERPL W P-5'-P-CCNC: 41 U/L (ref 0–70)
ANION GAP SERPL CALCULATED.3IONS-SCNC: 6 MMOL/L (ref 3–14)
AST SERPL W P-5'-P-CCNC: 26 U/L (ref 0–45)
BILIRUB SERPL-MCNC: 0.7 MG/DL (ref 0.2–1.3)
BUN SERPL-MCNC: 7 MG/DL (ref 7–30)
CALCIUM SERPL-MCNC: 9.4 MG/DL (ref 8.5–10.1)
CHLORIDE SERPL-SCNC: 99 MMOL/L (ref 94–109)
CHOLEST SERPL-MCNC: 179 MG/DL
CO2 SERPL-SCNC: 29 MMOL/L (ref 20–32)
CREAT SERPL-MCNC: 0.55 MG/DL (ref 0.66–1.25)
EST. AVERAGE GLUCOSE BLD GHB EST-MCNC: 117 MG/DL
GFR SERPL CREATININE-BSD FRML MDRD: >90 ML/MIN/{1.73_M2}
GLUCOSE SERPL-MCNC: 126 MG/DL (ref 70–99)
HBA1C MFR BLD: 5.7 % (ref 0–5.6)
HDLC SERPL-MCNC: 76 MG/DL
LDLC SERPL CALC-MCNC: 87 MG/DL
NONHDLC SERPL-MCNC: 103 MG/DL
POTASSIUM SERPL-SCNC: 4.3 MMOL/L (ref 3.4–5.3)
PROT SERPL-MCNC: 7.8 G/DL (ref 6.8–8.8)
PSA SERPL-ACNC: 0.51 UG/L (ref 0–4)
SODIUM SERPL-SCNC: 134 MMOL/L (ref 133–144)
TRIGL SERPL-MCNC: 79 MG/DL

## 2019-11-19 PROCEDURE — 87389 HIV-1 AG W/HIV-1&-2 AB AG IA: CPT | Mod: ZL | Performed by: NURSE PRACTITIONER

## 2019-11-19 PROCEDURE — 80053 COMPREHEN METABOLIC PANEL: CPT | Mod: ZL | Performed by: NURSE PRACTITIONER

## 2019-11-19 PROCEDURE — 83036 HEMOGLOBIN GLYCOSYLATED A1C: CPT | Mod: ZL | Performed by: NURSE PRACTITIONER

## 2019-11-19 PROCEDURE — G0103 PSA SCREENING: HCPCS | Mod: ZL | Performed by: NURSE PRACTITIONER

## 2019-11-19 PROCEDURE — 99397 PER PM REEVAL EST PAT 65+ YR: CPT | Performed by: NURSE PRACTITIONER

## 2019-11-19 PROCEDURE — G0296 VISIT TO DETERM LDCT ELIG: HCPCS | Performed by: NURSE PRACTITIONER

## 2019-11-19 PROCEDURE — 40000788 ZZHCL STATISTIC ESTIMATED AVERAGE GLUCOSE: Mod: ZL | Performed by: NURSE PRACTITIONER

## 2019-11-19 PROCEDURE — 36415 COLL VENOUS BLD VENIPUNCTURE: CPT | Mod: ZL | Performed by: NURSE PRACTITIONER

## 2019-11-19 PROCEDURE — 80061 LIPID PANEL: CPT | Mod: ZL | Performed by: NURSE PRACTITIONER

## 2019-11-19 RX ORDER — ROSUVASTATIN CALCIUM 20 MG/1
20 TABLET, COATED ORAL DAILY
Qty: 90 TABLET | Refills: 3 | Status: SHIPPED | OUTPATIENT
Start: 2019-11-19 | End: 2020-10-07

## 2019-11-19 RX ORDER — TRIAMCINOLONE ACETONIDE 1 MG/G
CREAM TOPICAL 2 TIMES DAILY
Qty: 453 G | Refills: 0 | Status: SHIPPED | OUTPATIENT
Start: 2019-11-19 | End: 2020-07-20

## 2019-11-19 RX ORDER — AMLODIPINE BESYLATE 5 MG/1
5 TABLET ORAL DAILY
Qty: 30 TABLET | Refills: 1 | Status: SHIPPED | OUTPATIENT
Start: 2019-11-19 | End: 2019-12-03

## 2019-11-19 RX ORDER — ALBUTEROL SULFATE 90 UG/1
AEROSOL, METERED RESPIRATORY (INHALATION)
Qty: 2 INHALER | Refills: 5 | Status: SHIPPED | OUTPATIENT
Start: 2019-11-19 | End: 2021-04-07

## 2019-11-19 RX ORDER — LOSARTAN POTASSIUM 100 MG/1
100 TABLET ORAL DAILY
Qty: 90 TABLET | Refills: 1 | Status: SHIPPED | OUTPATIENT
Start: 2019-11-19 | End: 2020-07-07

## 2019-11-19 RX ORDER — METOPROLOL SUCCINATE 200 MG/1
200 TABLET, EXTENDED RELEASE ORAL DAILY
Qty: 90 TABLET | Refills: 1 | Status: SHIPPED | OUTPATIENT
Start: 2019-11-19 | End: 2020-05-27

## 2019-11-19 ASSESSMENT — MIFFLIN-ST. JEOR: SCORE: 1436.5

## 2019-11-19 ASSESSMENT — PAIN SCALES - GENERAL: PAINLEVEL: NO PAIN (0)

## 2019-11-19 NOTE — ADDENDUM NOTE
Addended by: WILLI NOONAN on: 11/19/2019 12:26 PM     Modules accepted: Level of Service, SmartSet

## 2019-11-19 NOTE — NURSING NOTE
"Chief Complaint   Patient presents with     Physical       Initial BP (!) 152/82 (BP Location: Left arm, Patient Position: Chair, Cuff Size: Adult Large)   Pulse 81   Temp 98  F (36.7  C) (Tympanic)   Ht 1.613 m (5' 3.5\")   Wt 74.8 kg (165 lb)   SpO2 94%   BMI 28.77 kg/m   Estimated body mass index is 28.77 kg/m  as calculated from the following:    Height as of this encounter: 1.613 m (5' 3.5\").    Weight as of this encounter: 74.8 kg (165 lb).  Medication Reconciliation: complete  Francesca Ceballos LPN  "

## 2019-11-20 LAB — HIV 1+2 AB+HIV1 P24 AG SERPL QL IA: NONREACTIVE

## 2019-11-29 NOTE — PROGRESS NOTES
Subjective     Jaxson Ramirez is a 64 year old male who presents to clinic today for the following health issues:    HPI   Hypertension Follow-up    Patient was seen 2 weeks ago and his blood pressure was high. His losartan and metoprolol were continued, but 5 mg of amlodipine was added.       Do you check your blood pressure regularly outside of the clinic? No     Are you following a low salt diet? Yes    Are your blood pressures ever more than 140 on the top number (systolic) OR more   than 90 on the bottom number (diastolic), for example 140/90?  none  He continues to smoke. No interest in quitting. ***.   He also drinks 4-6 beers per night. Encouraged to stop.       How many servings of fruits and vegetables do you eat daily?  2-3    On average, how many sweetened beverages do you drink each day (Examples: soda, juice, sweet tea, etc.  Do NOT count diet or artificially sweetened beverages)?   0    How many days per week do you miss taking your medication? {0-7 :690689}    Patient Active Problem List   Diagnosis     Essential hypertension     Pulmonary emphysema, unspecified emphysema type (H)     Tobacco abuse     Hyperlipidemia, unspecified hyperlipidemia type     Alcohol consumption heavy     BPH associated with nocturia     Elevated fasting glucose     Overweight (BMI 25.0-29.9)     Past Surgical History:   Procedure Laterality Date     cataract right       layngoscopy  2009    layngoscopy     UVULECTOMY         Social History     Tobacco Use     Smoking status: Current Every Day Smoker     Packs/day: 1.00     Smokeless tobacco: Never Used   Substance Use Topics     Alcohol use: Yes     Comment: 6  pack per day     Family History   Problem Relation Age of Onset     Hypertension Brother      Cancer Brother         renal     Liver Cancer Mother      Parkinsonism Father      Bladder Cancer Father         smoker         Current Outpatient Medications   Medication Sig Dispense Refill     albuterol (PROAIR  "HFA/PROVENTIL HFA/VENTOLIN HFA) 108 (90 Base) MCG/ACT inhaler INHALE 2 PUFFS BY MOUTH EVERY 4 HOURS AS NEEDED FOR WHEEZING AND FOR SHORTNESS OF BREATH SHAKE BEFORE USING. 2 Inhaler 5     amLODIPine (NORVASC) 5 MG tablet Take 1 tablet (5 mg) by mouth daily 30 tablet 1     ASPIRIN PO Take 81 mg by mouth daily       fluticasone-vilanterol (BREO ELLIPTA) 100-25 MCG/INH inhaler Inhale 1 puff into the lungs daily 2 Inhaler 3     losartan (COZAAR) 100 MG tablet Take 1 tablet (100 mg) by mouth daily 90 tablet 1     metoprolol succinate ER (TOPROL-XL) 200 MG 24 hr tablet Take 1 tablet (200 mg) by mouth daily 90 tablet 1     rosuvastatin (CRESTOR) 20 MG tablet Take 1 tablet (20 mg) by mouth daily 90 tablet 3     tiotropium (SPIRIVA) 18 MCG inhaled capsule Inhale 1 capsule (18 mcg) into the lungs daily 90 capsule 3     triamcinolone (KENALOG) 0.1 % external cream Apply topically 2 times daily 453 g 0     Allergies   Allergen Reactions     Ace Inhibitors      Tramadol Hcl Diarrhea     Vomiting  Ultram         Reviewed and updated as needed this visit by Provider         Review of Systems   {ROS COMP (Optional):996265}      Objective    There were no vitals taken for this visit.  There is no height or weight on file to calculate BMI.  Physical Exam   {Exam List (Optional):890539}    {Diagnostic Test Results (Optional):883685::\"Diagnostic Test Results:\",\"Labs reviewed in Epic\"}        Assessment & Plan     {Diag Picklist:874468}     Tobacco Cessation:   reports that he has been smoking. He has been smoking about 1.00 pack per day. He has never used smokeless tobacco.  Tobacco Cessation Action Plan: Information offered: Patient not interested at this time      BMI:   Estimated body mass index is 28.77 kg/m  as calculated from the following:    Height as of 11/19/19: 1.613 m (5' 3.5\").    Weight as of 11/19/19: 74.8 kg (165 lb).       Annette William NP  [facility] [place]      "

## 2019-12-03 ENCOUNTER — TELEPHONE (OUTPATIENT)
Dept: FAMILY MEDICINE | Facility: OTHER | Age: 64
End: 2019-12-03

## 2019-12-03 ENCOUNTER — OFFICE VISIT (OUTPATIENT)
Dept: FAMILY MEDICINE | Facility: OTHER | Age: 64
End: 2019-12-03
Attending: NURSE PRACTITIONER
Payer: COMMERCIAL

## 2019-12-03 VITALS
WEIGHT: 165 LBS | SYSTOLIC BLOOD PRESSURE: 118 MMHG | OXYGEN SATURATION: 98 % | BODY MASS INDEX: 28.17 KG/M2 | DIASTOLIC BLOOD PRESSURE: 72 MMHG | HEART RATE: 86 BPM | TEMPERATURE: 97.7 F | HEIGHT: 64 IN

## 2019-12-03 DIAGNOSIS — I10 ESSENTIAL HYPERTENSION: ICD-10-CM

## 2019-12-03 DIAGNOSIS — I10 ESSENTIAL HYPERTENSION: Primary | ICD-10-CM

## 2019-12-03 PROCEDURE — 90670 PCV13 VACCINE IM: CPT

## 2019-12-03 PROCEDURE — G0009 ADMIN PNEUMOCOCCAL VACCINE: HCPCS

## 2019-12-03 PROCEDURE — G0463 HOSPITAL OUTPT CLINIC VISIT: HCPCS

## 2019-12-03 RX ORDER — AMLODIPINE BESYLATE 5 MG/1
5 TABLET ORAL DAILY
Qty: 90 TABLET | Refills: 1 | Status: SHIPPED | OUTPATIENT
Start: 2019-12-03 | End: 2020-07-07

## 2019-12-03 ASSESSMENT — MIFFLIN-ST. JEOR: SCORE: 1444.44

## 2019-12-03 ASSESSMENT — PAIN SCALES - GENERAL: PAINLEVEL: NO PAIN (0)

## 2019-12-03 NOTE — TELEPHONE ENCOUNTER
Patient came in for office visit for HTN. Left after nurse checked BP. BP and HR normal. Will cancel appointment and change to nurse only. Will have nursing staff refill Norvasc times 6 months.

## 2019-12-03 NOTE — PROGRESS NOTES
Patient in clinic for outpatient BP reading.   The patient was in a sitting position with feet on the floor - Yes  Has the patient smoked in the last 15 minutes - No  Has the patient exercised within the last 15 minutes - No  Patient was instructed to not cross legs or talk during the procedure - Yes  Patients arm was palm upward, slightly flexed and with the whole arm supported at heart level Yes  Cuff size was measured and is appropriate size for patient using established guideline - Yes  ?  The BP reading today was 118/72 and pulse was 86.

## 2019-12-03 NOTE — NURSING NOTE
"Chief Complaint   Patient presents with     Hypertension     2 week f/u        Initial /72 (BP Location: Left arm, Patient Position: Chair, Cuff Size: Adult Large)   Pulse 86   Temp 97.7  F (36.5  C) (Tympanic)   Ht 1.626 m (5' 4\")   Wt 74.8 kg (165 lb)   SpO2 98%   BMI 28.32 kg/m   Estimated body mass index is 28.32 kg/m  as calculated from the following:    Height as of this encounter: 1.626 m (5' 4\").    Weight as of this encounter: 74.8 kg (165 lb).  Medication Reconciliation: complete  Francesca Ceballos LPN  "

## 2019-12-04 ENCOUNTER — OFFICE VISIT (OUTPATIENT)
Dept: DERMATOLOGY | Facility: OTHER | Age: 64
End: 2019-12-04
Attending: NURSE PRACTITIONER
Payer: COMMERCIAL

## 2019-12-04 VITALS
HEIGHT: 67 IN | DIASTOLIC BLOOD PRESSURE: 60 MMHG | BODY MASS INDEX: 25.9 KG/M2 | OXYGEN SATURATION: 94 % | SYSTOLIC BLOOD PRESSURE: 130 MMHG | HEART RATE: 85 BPM | WEIGHT: 165 LBS | TEMPERATURE: 98.2 F

## 2019-12-04 DIAGNOSIS — B35.6 TINEA CRURIS: Primary | ICD-10-CM

## 2019-12-04 DIAGNOSIS — L72.0 INCLUSION CYST: ICD-10-CM

## 2019-12-04 PROCEDURE — 99201 ZZC OFFICE/OUTPT VISIT, NEW, LEVEL I: CPT | Performed by: DERMATOLOGY

## 2019-12-04 PROCEDURE — G0463 HOSPITAL OUTPT CLINIC VISIT: HCPCS

## 2019-12-04 RX ORDER — KETOCONAZOLE 20 MG/G
CREAM TOPICAL
Qty: 30 G | Refills: 3 | Status: SHIPPED | OUTPATIENT
Start: 2019-12-04 | End: 2022-10-21

## 2019-12-04 ASSESSMENT — MIFFLIN-ST. JEOR: SCORE: 1492.07

## 2019-12-04 ASSESSMENT — PAIN SCALES - GENERAL: PAINLEVEL: NO PAIN (0)

## 2019-12-04 NOTE — NURSING NOTE
"Chief Complaint   Patient presents with     Consult For     NPT Inclusion Cyst on back     Derm Problem     Possible infected ingrown hair (grion area)       Initial /60   Pulse 85   Temp 98.2  F (36.8  C) (Tympanic)   Ht 1.702 m (5' 7\")   Wt 74.8 kg (165 lb)   SpO2 94%   BMI 25.84 kg/m   Estimated body mass index is 25.84 kg/m  as calculated from the following:    Height as of this encounter: 1.702 m (5' 7\").    Weight as of this encounter: 74.8 kg (165 lb).  Medication Reconciliation: complete  Angelica Willson LPN  "

## 2019-12-04 NOTE — LETTER
12/4/2019       RE: Jaxson Ramirez  [address]   [address]  [place] [place] [facility] [address]     Dear Colleague,    Thank you for referring your patient, Jaxson Ramirez, to the [facility] [facility]. Please see a copy of my visit note below.    dictated    Again, thank you for allowing me to participate in the care of your patient.      Sincerely,    ROEL Richards MD

## 2019-12-05 NOTE — CONSULTS
Consult Date:  2019      SUBJECTIVE:  Jaxson comes in today because of the fact that he has been having some lesions on his back and chest.  Also, he has a left groin lesion that is unilateral and of concern.      OBJECTIVE:  On examination, he shows a keratinaceous cyst on his back that is soft which there is little need to remove.  He is not bothered by it.  He does have triple comedones all over his back and many white scars from teenage scarring acne in my opinion.  He does show a bit of dryness, but nothing too severe.  The most important thing is in his left groin where there was a roughly 6 cm. pink white patch well-defined lesion that could represent a yeast infection, but I am also concerned about extramammary Paget's disease because of its color and general morphology.       PLAN:  For the groin lesion , I recommended ketoconazole cream twice daily and would like him to return in 6 weeks to see if it is any better.  If it is not better or significantly improved, I think a biopsy is appropriate .       MEDICATIONS AND ALLERGIES:  Reviewed.      Return in 6 weeks.         ROEL GALEAS MD             D: 2019   T: 2019   MT:       Name:     JAXSON SPRINGER   MRN:      [id number]        Account:       [id number]   :      1955           Consult Date:  2019      Document: [id number]

## 2020-02-18 ENCOUNTER — OFFICE VISIT (OUTPATIENT)
Dept: DERMATOLOGY | Facility: OTHER | Age: 65
End: 2020-02-18
Attending: DERMATOLOGY
Payer: COMMERCIAL

## 2020-02-18 VITALS
OXYGEN SATURATION: 90 % | WEIGHT: 168 LBS | SYSTOLIC BLOOD PRESSURE: 118 MMHG | DIASTOLIC BLOOD PRESSURE: 80 MMHG | TEMPERATURE: 99.1 F | HEIGHT: 67 IN | BODY MASS INDEX: 26.37 KG/M2 | HEART RATE: 88 BPM

## 2020-02-18 DIAGNOSIS — B35.6 TINEA CRURIS: Primary | ICD-10-CM

## 2020-02-18 PROCEDURE — G0463 HOSPITAL OUTPT CLINIC VISIT: HCPCS

## 2020-02-18 PROCEDURE — 99212 OFFICE O/P EST SF 10 MIN: CPT | Performed by: DERMATOLOGY

## 2020-02-18 ASSESSMENT — PAIN SCALES - GENERAL: PAINLEVEL: NO PAIN (0)

## 2020-02-18 ASSESSMENT — MIFFLIN-ST. JEOR: SCORE: 1505.67

## 2020-02-18 NOTE — NURSING NOTE
"Chief Complaint   Patient presents with     Derm Problem       Initial /80   Pulse 88   Temp 99.1  F (37.3  C)   Ht 1.702 m (5' 7\")   Wt 76.2 kg (168 lb)   SpO2 90%   BMI 26.31 kg/m   Estimated body mass index is 26.31 kg/m  as calculated from the following:    Height as of this encounter: 1.702 m (5' 7\").    Weight as of this encounter: 76.2 kg (168 lb).  Medication Reconciliation: complete  Carol Aquino LPN    "

## 2020-02-19 NOTE — CONSULTS
Consult Date:  2020      SUBJECTIVE:  Eulalio returns per my request because of the fact when he was last in in December I noted a round probably a 6 cm patch of skin in the left groin that had some features of extramammary Paget's in my opinion.  I advised that he apply the ketoconazole to that area and return to see if it did change.  He comes in for that purpose.      OBJECTIVE:  On examination of the lesion is about the same size, but less reddened and to me does not look clinically like extramammary Paget's at this point.  I do also notice in the right groin there reddish lesions suggesting more typical tinea cruris.     PLAN:  I advised that he continue with the ketoconazole to both sites, also powder and return to see me in 6 months, sooner should the lesion of the left groin seem to be enlarging or causing symptoms.      MEDICATIONS AND ALLERGIES:  Reviewed.      Return in 6 months.         ROEL GALEAS MD             D: 2020   T: 2020   MT: SHERYL      Name:     EULALIO SPRINGER   MRN:      [id number]        Account:       [id number]   :      1955           Consult Date:  2020      Document: [id number]       cc: Annette Carney NP

## 2020-05-25 DIAGNOSIS — I10 ESSENTIAL HYPERTENSION: ICD-10-CM

## 2020-05-27 RX ORDER — METOPROLOL SUCCINATE 200 MG/1
TABLET, EXTENDED RELEASE ORAL
Qty: 90 TABLET | Refills: 0 | Status: SHIPPED | OUTPATIENT
Start: 2020-05-27 | End: 2020-07-07

## 2020-07-06 NOTE — PROGRESS NOTES
"Jaxson Ramirez is a 64 year old male who is being evaluated via a billable telephone visit.      The patient has been notified of following:     \"This telephone visit will be conducted via a call between you and your physician/provider. We have found that certain health care needs can be provided without the need for a physical exam.  This service lets us provide the care you need with a short phone conversation.  If a prescription is necessary we can send it directly to your pharmacy.  If lab work is needed we can place an order for that and you can then stop by our lab to have the test done at a later time.    Telephone visits are billed at different rates depending on your insurance coverage. During this emergency period, for some insurers they may be billed the same as an in-person visit.  Please reach out to your insurance provider with any questions.    If during the course of the call the physician/provider feels a telephone visit is not appropriate, you will not be charged for this service.\"    Patient has given verbal consent for Telephone visit?  Yes    What phone number would you like to be contacted at? [phone]    How would you like to obtain your AVS? Mail a copy    Subjective     Jaxson Ramirez is a 64 year old male who presents via phone visit today for the following health issues:    HPI  Cyst on Buttock      Duration: 1 week    Description (location/character/radiation): erythematous lump on left buttock, was draining purulent fluid    Intensity:  mild    Accompanying signs and symptoms: some purulent drainage, no fevers, no pain    History (similar episodes/previous evaluation): yes, 3 years ago, needed I and D    Precipitating or alleviating factors: None    Therapies tried and outcome: gauze with abx cream helps      Patient also has HTN. Taking losartan, metoprolol, and amlodipine[ine without side effects. Denies chest pain, shortness of breath, dizziness, syncope, or palpitations. He " does smoke. No interest in quitting.       Patient Active Problem List   Diagnosis     Essential hypertension     Pulmonary emphysema, unspecified emphysema type (H)     Tobacco abuse     Hyperlipidemia, unspecified hyperlipidemia type     Alcohol consumption heavy     BPH associated with nocturia     Elevated fasting glucose     Overweight (BMI 25.0-29.9)     Past Surgical History:   Procedure Laterality Date     cataract right       layngoscopy  2009    layngoscopy     UVULECTOMY         Social History     Tobacco Use     Smoking status: Current Every Day Smoker     Packs/day: 1.00     Smokeless tobacco: Never Used     Tobacco comment: pt denied QP 12/4/19   Substance Use Topics     Alcohol use: Yes     Comment: 6  pack per day     Family History   Problem Relation Age of Onset     Hypertension Brother      Cancer Brother         renal     Liver Cancer Mother      Parkinsonism Father      Bladder Cancer Father         smoker         Current Outpatient Medications   Medication Sig Dispense Refill     albuterol (PROAIR HFA/PROVENTIL HFA/VENTOLIN HFA) 108 (90 Base) MCG/ACT inhaler INHALE 2 PUFFS BY MOUTH EVERY 4 HOURS AS NEEDED FOR WHEEZING AND FOR SHORTNESS OF BREATH SHAKE BEFORE USING. 2 Inhaler 5     amLODIPine (NORVASC) 5 MG tablet Take 1 tablet (5 mg) by mouth daily 90 tablet 1     ASPIRIN PO Take 81 mg by mouth daily       fluticasone-vilanterol (BREO ELLIPTA) 100-25 MCG/INH inhaler Inhale 1 puff into the lungs daily 2 Inhaler 3     ketoconazole (NIZORAL) 2 % external cream Apply bid to left groin area 30 g 3     losartan (COZAAR) 100 MG tablet Take 1 tablet (100 mg) by mouth daily 90 tablet 1     metoprolol succinate ER (TOPROL-XL) 200 MG 24 hr tablet Take 1 tablet by mouth once daily 90 tablet 0     rosuvastatin (CRESTOR) 20 MG tablet Take 1 tablet (20 mg) by mouth daily 90 tablet 3     tiotropium (SPIRIVA) 18 MCG inhaled capsule Inhale 1 capsule (18 mcg) into the lungs daily 90 capsule 3     triamcinolone  (KENALOG) 0.1 % external cream Apply topically 2 times daily 453 g 0     Allergies   Allergen Reactions     Ace Inhibitors      Tramadol Hcl Diarrhea     Vomiting  Ultram         Reviewed and updated as needed this visit by Provider         Review of Systems   As noted in the HPI.        Objective   Reported vitals:  There were no vitals taken for this visit.   healthy, alert and no distress  PSYCH: Alert and oriented times 3; coherent speech, normal   rate and volume, able to articulate logical thoughts, able   to abstract reason, no tangential thoughts, no hallucinations   or delusions  His affect is normal  RESP: No cough, no audible wheezing, able to talk in full sentences  Remainder of exam unable to be completed due to telephone visits    Diagnostic Test Results:  none         Assessment/Plan:  1. Infected cyst of skin  Patient sounds like he has an infected cyst in the middle of his left buttock. No fevers. Does not sound like a pilonidal cyst given location. Draining fluid. Will try treating with Keflex times 10 days. If no improvement, he will need to see surgery for I and D.     - cephALEXin (KEFLEX) 500 MG capsule; Take 1 capsule (500 mg) by mouth 4 times daily for 10 days  Dispense: 40 capsule; Refill: 0    2. Essential hypertension  Well controlled a this last visit in 11/2019. Continue current medications. Encouraged daily exercise and a low sodium diet. Recommended checking BP's 2x/wk, call the clinic if consistantly s>140 or d>90. Follow up in 3 months in the clinic.       - amLODIPine (NORVASC) 5 MG tablet; Take 1 tablet (5 mg) by mouth daily  Dispense: 90 tablet; Refill: 1  - losartan (COZAAR) 100 MG tablet; Take 1 tablet (100 mg) by mouth daily  Dispense: 90 tablet; Refill: 1  - metoprolol succinate ER (TOPROL-XL) 200 MG 24 hr tablet; Take 1 tablet (200 mg) by mouth daily  Dispense: 90 tablet; Refill: 3    No follow-ups on file.      Phone call duration:  11 minutes    Annette William NP

## 2020-07-07 ENCOUNTER — VIRTUAL VISIT (OUTPATIENT)
Dept: FAMILY MEDICINE | Facility: OTHER | Age: 65
End: 2020-07-07
Attending: NURSE PRACTITIONER
Payer: COMMERCIAL

## 2020-07-07 VITALS — WEIGHT: 165 LBS | BODY MASS INDEX: 25.84 KG/M2

## 2020-07-07 DIAGNOSIS — I10 ESSENTIAL HYPERTENSION: ICD-10-CM

## 2020-07-07 DIAGNOSIS — L72.9 INFECTED CYST OF SKIN: Primary | ICD-10-CM

## 2020-07-07 DIAGNOSIS — L08.9 INFECTED CYST OF SKIN: Primary | ICD-10-CM

## 2020-07-07 PROCEDURE — 99213 OFFICE O/P EST LOW 20 MIN: CPT | Mod: TEL | Performed by: NURSE PRACTITIONER

## 2020-07-07 RX ORDER — LOSARTAN POTASSIUM 100 MG/1
100 TABLET ORAL DAILY
Qty: 90 TABLET | Refills: 1 | Status: SHIPPED | OUTPATIENT
Start: 2020-07-07 | End: 2021-05-12

## 2020-07-07 RX ORDER — METOPROLOL SUCCINATE 200 MG/1
200 TABLET, EXTENDED RELEASE ORAL DAILY
Qty: 90 TABLET | Refills: 3 | Status: SHIPPED | OUTPATIENT
Start: 2020-07-07 | End: 2021-04-07

## 2020-07-07 RX ORDER — CEPHALEXIN 500 MG/1
500 CAPSULE ORAL 4 TIMES DAILY
Qty: 40 CAPSULE | Refills: 0 | Status: SHIPPED | OUTPATIENT
Start: 2020-07-07 | End: 2020-07-22

## 2020-07-07 RX ORDER — AMLODIPINE BESYLATE 5 MG/1
5 TABLET ORAL DAILY
Qty: 90 TABLET | Refills: 1 | Status: SHIPPED | OUTPATIENT
Start: 2020-07-07 | End: 2020-10-07

## 2020-07-07 ASSESSMENT — PAIN SCALES - GENERAL: PAINLEVEL: NO PAIN (1)

## 2020-07-07 NOTE — NURSING NOTE
"Chief Complaint   Patient presents with     Derm Problem       Initial Wt 74.8 kg (165 lb)   BMI 25.84 kg/m   Estimated body mass index is 25.84 kg/m  as calculated from the following:    Height as of 2/18/20: 1.702 m (5' 7\").    Weight as of this encounter: 74.8 kg (165 lb).  Medication Reconciliation: complete  Tiffanie Morfin LPN    "

## 2020-07-13 ENCOUNTER — TELEPHONE (OUTPATIENT)
Dept: FAMILY MEDICINE | Facility: OTHER | Age: 65
End: 2020-07-13

## 2020-07-13 NOTE — TELEPHONE ENCOUNTER
"Spoke with patient . He stated that after taking the ABX for  2 days the cyst he had did reduce in size. He currently has 3 days left on the ABX but states its now a hard lump . Refers it as a \" life saver\" size also tender to the touch and uncomfortable to sit.     Please advise   "

## 2020-07-13 NOTE — TELEPHONE ENCOUNTER
Patient was seen last week for a cyst on his buttocks. Patient called to let pcp know that the cyst did not drain and there is still a core lump in the area of the cyst.  Please call him back.  [phone]

## 2020-07-14 NOTE — TELEPHONE ENCOUNTER
Annette William, VITA  You 16 minutes ago (11:29 AM)       He needs an appointment. Please help him schedule    Message text      Please help scheduling

## 2020-07-14 NOTE — TELEPHONE ENCOUNTER
Patient returned call as he did not receive a return call. Patient states he was hoping the cyst would drain out. Patient would like PCP advise.

## 2020-07-15 ENCOUNTER — TELEPHONE (OUTPATIENT)
Dept: FAMILY MEDICINE | Facility: OTHER | Age: 65
End: 2020-07-15

## 2020-07-15 NOTE — TELEPHONE ENCOUNTER
[phone]    Patient is questioning if PCP would like him to continue with antibiotics until his appointment next Thursday.  If so he will be running out tomorrow of the 10 day supply that he has. Please call him and let him know.

## 2020-07-17 NOTE — TELEPHONE ENCOUNTER
Informed patient that Annette would like him to follow with Madelin. He does have another appt. Follow up with Annette on Wednesday which he would like to keep.

## 2020-07-17 NOTE — TELEPHONE ENCOUNTER
Called pt, he reports that the area is the same. Still red but swelling has decreased. Reports that area is uncomfortable. Has one abx left. Please advise. Thank you!

## 2020-07-20 NOTE — PROGRESS NOTES
Subjective     Jaxson Ramirez is a 65 year old male who presents to clinic today for the following health issues:    HPI       Infected Sebaceous Cyst      Patient was called on 7/7/20 and sounded as if he had an infected sebaceous cyst. He had an erythematous cyst on left buttock that was painful. Keflex was prescribed for 10 days. Completed on 7/15/20. Follows up today for reassessment. Today he notes that his pain has decreased and cyst is no longer erythematous. Still has some discomfort when sitting. No fevers. No drainage.     Duration: 2 weeks     Description (location/character/radiation): states he can still feel it, but it has got better. He is now able to sit down without any discomfort     Intensity:  mild    Accompanying signs and symptoms: lump to left buttock     History (similar episodes/previous evaluation): none     Precipitating or alleviating factors: None    Therapies tried and outcome: ABX Keflex 500 mg q.i.d. x 10 days     Due for colonoscopy. Declines, but agrees to complete Cologuard.     Patient Active Problem List   Diagnosis     Essential hypertension     Pulmonary emphysema, unspecified emphysema type (H)     Tobacco abuse     Hyperlipidemia, unspecified hyperlipidemia type     Alcohol consumption heavy     BPH associated with nocturia     Elevated fasting glucose     Overweight (BMI 25.0-29.9)     Past Surgical History:   Procedure Laterality Date     cataract right       layngoscopy  2009    layngoscopy     UVULECTOMY         Social History     Tobacco Use     Smoking status: Current Every Day Smoker     Packs/day: 1.00     Smokeless tobacco: Never Used     Tobacco comment: pt denied QP 12/4/19   Substance Use Topics     Alcohol use: Yes     Comment: 6  pack per day     Family History   Problem Relation Age of Onset     Hypertension Brother      Cancer Brother         renal     Liver Cancer Mother      Parkinsonism Father      Bladder Cancer Father         smoker         Current  "Outpatient Medications   Medication Sig Dispense Refill     albuterol (PROAIR HFA/PROVENTIL HFA/VENTOLIN HFA) 108 (90 Base) MCG/ACT inhaler INHALE 2 PUFFS BY MOUTH EVERY 4 HOURS AS NEEDED FOR WHEEZING AND FOR SHORTNESS OF BREATH SHAKE BEFORE USING. 2 Inhaler 5     amLODIPine (NORVASC) 5 MG tablet Take 1 tablet (5 mg) by mouth daily 90 tablet 1     ASPIRIN PO Take 81 mg by mouth daily       fluticasone-vilanterol (BREO ELLIPTA) 100-25 MCG/INH inhaler Inhale 1 puff into the lungs daily 2 Inhaler 3     INCRUSE ELLIPTA 62.5 MCG/INH Inhaler        ketoconazole (NIZORAL) 2 % external cream Apply bid to left groin area 30 g 3     losartan (COZAAR) 100 MG tablet Take 1 tablet (100 mg) by mouth daily 90 tablet 1     metoprolol succinate ER (TOPROL-XL) 200 MG 24 hr tablet Take 1 tablet (200 mg) by mouth daily 90 tablet 3     rosuvastatin (CRESTOR) 20 MG tablet Take 1 tablet (20 mg) by mouth daily 90 tablet 3     Allergies   Allergen Reactions     Ace Inhibitors      Tramadol Hcl Diarrhea     Vomiting  Ultram         Reviewed and updated as needed this visit by Provider         Review of Systems   As noted in the HPI.       Objective    /78 (BP Location: Left arm, Patient Position: Chair, Cuff Size: Adult Regular)   Pulse 72   Ht 1.626 m (5' 4\")   Wt 72.6 kg (160 lb)   BMI 27.46 kg/m    Body mass index is 27.46 kg/m .  Physical Exam   GENERAL: healthy, alert and no distress  NEURO: Normal strength and tone, mentation intact and speech normal  PSYCH: mentation appears normal, affect normal/bright  LEFT BUTTOCK: patient has nickel sized mass on left gluteal fold, no erythema noted, no drainage noted, slight discomfort with palpation     Diagnostic Test Results:  none         Assessment & Plan     1. Infected cyst of skin  Since erythema has resolved, I do not think he needs additional antibiotics. He still has some discomfort when sitting, however, and would like it removed. Will refer to surgery for removal.     - " "GENERAL SURG ADULT REFERRAL     BMI:   Estimated body mass index is 27.46 kg/m  as calculated from the following:    Height as of this encounter: 1.626 m (5' 4\").    Weight as of this encounter: 72.6 kg (160 lb).       Annette William NP  [facility] [place]    "

## 2020-07-22 ENCOUNTER — OFFICE VISIT (OUTPATIENT)
Dept: FAMILY MEDICINE | Facility: OTHER | Age: 65
End: 2020-07-22
Attending: NURSE PRACTITIONER
Payer: COMMERCIAL

## 2020-07-22 VITALS
DIASTOLIC BLOOD PRESSURE: 78 MMHG | BODY MASS INDEX: 27.31 KG/M2 | HEIGHT: 64 IN | SYSTOLIC BLOOD PRESSURE: 130 MMHG | WEIGHT: 160 LBS | HEART RATE: 72 BPM

## 2020-07-22 DIAGNOSIS — L72.9 INFECTED CYST OF SKIN: Primary | ICD-10-CM

## 2020-07-22 DIAGNOSIS — L08.9 INFECTED CYST OF SKIN: Primary | ICD-10-CM

## 2020-07-22 PROCEDURE — G0463 HOSPITAL OUTPT CLINIC VISIT: HCPCS

## 2020-07-22 PROCEDURE — 99213 OFFICE O/P EST LOW 20 MIN: CPT | Performed by: NURSE PRACTITIONER

## 2020-07-22 ASSESSMENT — MIFFLIN-ST. JEOR: SCORE: 1416.76

## 2020-07-22 ASSESSMENT — PAIN SCALES - GENERAL: PAINLEVEL: NO PAIN (1)

## 2020-07-22 NOTE — NURSING NOTE
"Chief Complaint   Patient presents with     Derm Problem       Initial /78 (BP Location: Left arm, Patient Position: Chair, Cuff Size: Adult Regular)   Pulse 72   Ht 1.626 m (5' 4\")   Wt 72.6 kg (160 lb)   BMI 27.46 kg/m   Estimated body mass index is 27.46 kg/m  as calculated from the following:    Height as of this encounter: 1.626 m (5' 4\").    Weight as of this encounter: 72.6 kg (160 lb).  Medication Reconciliation: complete  Francesca Ceballos LPN  "

## 2020-07-29 ENCOUNTER — OFFICE VISIT (OUTPATIENT)
Dept: SURGERY | Facility: OTHER | Age: 65
End: 2020-07-29
Attending: NURSE PRACTITIONER
Payer: COMMERCIAL

## 2020-07-29 VITALS
DIASTOLIC BLOOD PRESSURE: 68 MMHG | BODY MASS INDEX: 25.11 KG/M2 | OXYGEN SATURATION: 93 % | HEIGHT: 67 IN | WEIGHT: 160 LBS | SYSTOLIC BLOOD PRESSURE: 140 MMHG | HEART RATE: 94 BPM | TEMPERATURE: 98.4 F

## 2020-07-29 DIAGNOSIS — L08.9 INFECTED CYST OF SKIN: ICD-10-CM

## 2020-07-29 DIAGNOSIS — L72.9 INFECTED CYST OF SKIN: ICD-10-CM

## 2020-07-29 PROCEDURE — G0463 HOSPITAL OUTPT CLINIC VISIT: HCPCS

## 2020-07-29 PROCEDURE — 99203 OFFICE O/P NEW LOW 30 MIN: CPT | Performed by: SURGERY

## 2020-07-29 ASSESSMENT — PAIN SCALES - GENERAL: PAINLEVEL: NO PAIN (0)

## 2020-07-29 ASSESSMENT — MIFFLIN-ST. JEOR: SCORE: 1464.39

## 2020-07-29 NOTE — NURSING NOTE
"Chief Complaint   Patient presents with     Consult     Left buttock cyst-Annette William       Initial BP (!) 140/68 (BP Location: Left arm, Patient Position: Sitting, Cuff Size: Adult Regular)   Pulse 94   Temp 98.4  F (36.9  C) (Tympanic)   Ht 1.702 m (5' 7\")   Wt 72.6 kg (160 lb)   SpO2 93%   BMI 25.06 kg/m   Estimated body mass index is 25.06 kg/m  as calculated from the following:    Height as of this encounter: 1.702 m (5' 7\").    Weight as of this encounter: 72.6 kg (160 lb).  Medication Reconciliation: complete  Sheila Marroquin MA    "

## 2020-08-04 NOTE — PROGRESS NOTES
[facility] Surgery Consultation    CC:  Cyst on buttock    HPI:  This 65 year old year old male is seen at the request of Annette William for evaluation of cyst of skin of buttock. He has history of cyst requiring incision and drainage in the past. He was placed in antibiotics on 7/7 and since that point has almost completely resolved. He has not had swelling in this specific location before on his left buttock.     Past Medical History:   Diagnosis Date     Cellulitis and abscess of oral soft tissues      COPD (chronic obstructive pulmonary disease) (H)      Hypertension        Past Surgical History:   Procedure Laterality Date     cataract right       layngoscopy  2009    layngoscopy     UVULECTOMY         Allergies   Allergen Reactions     Ace Inhibitors      Tramadol Hcl Diarrhea     Vomiting  Ultram         Current Outpatient Medications   Medication     albuterol (PROAIR HFA/PROVENTIL HFA/VENTOLIN HFA) 108 (90 Base) MCG/ACT inhaler     amLODIPine (NORVASC) 5 MG tablet     ASPIRIN PO     fluticasone-vilanterol (BREO ELLIPTA) 100-25 MCG/INH inhaler     INCRUSE ELLIPTA 62.5 MCG/INH Inhaler     ketoconazole (NIZORAL) 2 % external cream     losartan (COZAAR) 100 MG tablet     metoprolol succinate ER (TOPROL-XL) 200 MG 24 hr tablet     rosuvastatin (CRESTOR) 20 MG tablet     No current facility-administered medications for this visit.        HABITS:    Social History     Tobacco Use     Smoking status: Current Every Day Smoker     Packs/day: 1.00     Smokeless tobacco: Never Used     Tobacco comment: pt denied QP 12/4/19   Substance Use Topics     Alcohol use: Yes     Comment: 6  pack per day     Drug use: No       Family History   Problem Relation Age of Onset     Hypertension Brother      Cancer Brother         renal     Liver Cancer Mother      Parkinsonism Father      Bladder Cancer Father         smoker       REVIEW OF SYSTEMS:  Ten point review of systems negative except those mentioned in the HPI.  "    OBJECTIVE:    BP (!) 140/68 (BP Location: Left arm, Patient Position: Sitting, Cuff Size: Adult Regular)   Pulse 94   Temp 98.4  F (36.9  C) (Tympanic)   Ht 1.702 m (5' 7\")   Wt 72.6 kg (160 lb)   SpO2 93%   BMI 25.06 kg/m      GENERAL: Generally appears well, in no distress with appropriate affect.  HEENT:   Sclerae anicteric - normocephalic atraumatic   Respiratory:  No acute distress, no splinting   Cardiovascular:  Regular Rate and Rhythm  Extremities:  Extremities normal. No deformities, edema, or skin discoloration.  Skin:  Left buttock there is a small area of induration no tenderness, posterior neck, mobile cystic lesion.   Neurological: grossly intact  Psych:  Alert, oriented, affect appropriate with normal decision making ability.    IMPRESSION:    65 y.o. male with history of infected sebaceous cyst seen by PCP with concern for another infected sebaceous cyst. He was placed on antibiotics, he denies pain today and while there is still some mild induration I believe this is resolving and does not require drainage. I would not recommend removal at this point as would end up being a much larger excision.     PLAN:    Continued observation, possible complete removal after all inflammation has subsided.     Dennis Small MD,     8/4/2020  7:43 AM        "

## 2020-08-25 ENCOUNTER — OFFICE VISIT (OUTPATIENT)
Dept: DERMATOLOGY | Facility: OTHER | Age: 65
End: 2020-08-25
Attending: NURSE PRACTITIONER
Payer: COMMERCIAL

## 2020-08-25 VITALS
WEIGHT: 165 LBS | SYSTOLIC BLOOD PRESSURE: 128 MMHG | HEIGHT: 67 IN | HEART RATE: 82 BPM | DIASTOLIC BLOOD PRESSURE: 70 MMHG | TEMPERATURE: 98.4 F | OXYGEN SATURATION: 95 % | BODY MASS INDEX: 25.9 KG/M2

## 2020-08-25 DIAGNOSIS — L30.9 DERMATITIS: Primary | ICD-10-CM

## 2020-08-25 PROCEDURE — 99213 OFFICE O/P EST LOW 20 MIN: CPT | Performed by: DERMATOLOGY

## 2020-08-25 PROCEDURE — G0463 HOSPITAL OUTPT CLINIC VISIT: HCPCS

## 2020-08-25 ASSESSMENT — MIFFLIN-ST. JEOR: SCORE: 1487.07

## 2020-08-25 ASSESSMENT — PAIN SCALES - GENERAL: PAINLEVEL: NO PAIN (0)

## 2020-08-25 NOTE — PATIENT INSTRUCTIONS
The groin rash is much better. I am no longer concerned about anything serious.    Would suggest another foot powder with miconazole (an anti-yeast and anti-fungal powder).     Can get at [facility] in the foot powder section - make sure is has miconazole in the ingredient information.     If the facial cyst continues to bother, we could remove it at an upcoming visit.

## 2020-08-25 NOTE — LETTER
2020       RE: Eulalio Springer  [address] [place] [address] [address]  [facility] [address]     Dear Colleague,    Thank you for referring your patient, Eulalio Springer, to the [facility] [place] at [facility]. Please see a copy of my visit note below.    Visit Date:   2020      SUBJECTIVE:  Rj returns for rechecking of his groin rash.  Initially I was thinking about extramammary Paget's disease, but I am no longer thinking of that entiry.     Objective:   Both sides show very slight erythema and slight scale, but no suggestion to me of the extramammary Paget's features.  He is using a [employer] foot powder in the groin.      OBJECTIVE:  On examination today of his back, he showed a very dramatic scarring from severe cystic acne in his youth.  He still has many open comedones there.  The chest looked free of any significant post-acne lesions.  His face and neck showed a variety of keratin like cysts that go along with cystic acne.      ASSESSMENT:  Improved inguinal dermatitis.      PLAN:  Switch to a miconazole-containing powder such as [facility] foot powder, use as needed to control the moisture and rash.  I believe Rj can now return on a p.r.n. basis      MEDICATIONS AND ALLERGIES:  Reviewed.         ROEL GALEAS MD             D: 2020   T: 2020   MT: PERLA      Name:     EULALIO SPRINGER   MRN:      [id number]        Account:      [id number]   :      1955           Visit Date:   2020      Document: [id number]        Again, thank you for allowing me to participate in the care of your patient.      Sincerely,    ROEL Galeas MD

## 2020-08-25 NOTE — NURSING NOTE
"Chief Complaint   Patient presents with     RECHECK     6 month follow up-right groin-getting better       Initial /70 (BP Location: Left arm, Patient Position: Chair, Cuff Size: Adult Regular)   Pulse 82   Temp 98.4  F (36.9  C) (Tympanic)   Ht 1.702 m (5' 7\")   Wt 74.8 kg (165 lb)   SpO2 95%   BMI 25.84 kg/m   Estimated body mass index is 25.84 kg/m  as calculated from the following:    Height as of this encounter: 1.702 m (5' 7\").    Weight as of this encounter: 74.8 kg (165 lb).  Medication Reconciliation: complete  RONALDO SINGLETON LPN    "

## 2020-08-25 NOTE — PROGRESS NOTES
Visit Date:   2020      SUBJECTIVE:  Rj returns for rechecking of his groin rash.  Initially I was thinking about extramammary Paget's disease, but I am no longer thinking of that entiry.     Objective:   Both sides show very slight erythema and slight scale, but no suggestion to me of the extramammary Paget's features.  He is using a [employer] foot powder in the groin.      OBJECTIVE:  On examination today of his back, he showed a very dramatic scarring from severe cystic acne in his youth.  He still has many open comedones there.  The chest looked free of any significant post-acne lesions.  His face and neck showed a variety of keratin like cysts that go along with cystic acne.      ASSESSMENT:  Improved inguinal dermatitis.      PLAN:  Switch to a miconazole-containing powder such as [facility] foot powder, use as needed to control the moisture and rash.  I believe Rj can now return on a p.r.n. basis      MEDICATIONS AND ALLERGIES:  Reviewed.         ROEL GALEAS MD             D: 2020   T: 2020   MT: PERLA      Name:     EULALIO SPRINGER   MRN:      [id number]        Account:      [id number]   :      1955           Visit Date:   2020      Document: [id number]

## 2020-09-01 ENCOUNTER — TRANSFERRED RECORDS (OUTPATIENT)
Dept: HEALTH INFORMATION MANAGEMENT | Facility: CLINIC | Age: 65
End: 2020-09-01

## 2020-09-01 LAB — COLOGUARD-ABSTRACT: POSITIVE

## 2020-09-09 ENCOUNTER — TELEPHONE (OUTPATIENT)
Dept: FAMILY MEDICINE | Facility: OTHER | Age: 65
End: 2020-09-09

## 2020-09-09 DIAGNOSIS — R19.5 POSITIVE COLORECTAL CANCER SCREENING USING COLOGUARD TEST: Primary | ICD-10-CM

## 2020-09-09 NOTE — TELEPHONE ENCOUNTER
Called patient. Informed him of positive cologuard results . A referral has been put in for general surgery for a colonoscopy. Cologuard results have ann sent to scanning.

## 2020-09-24 ENCOUNTER — TELEPHONE (OUTPATIENT)
Dept: SURGERY | Facility: OTHER | Age: 65
End: 2020-09-24

## 2020-09-24 ENCOUNTER — PREP FOR PROCEDURE (OUTPATIENT)
Dept: FAMILY MEDICINE | Facility: OTHER | Age: 65
End: 2020-09-24

## 2020-09-24 DIAGNOSIS — Z01.818 PREOP TESTING: Primary | ICD-10-CM

## 2020-09-24 DIAGNOSIS — R19.5 POSITIVE COLORECTAL CANCER SCREENING USING COLOGUARD TEST: ICD-10-CM

## 2020-09-24 DIAGNOSIS — Z12.11 COLON CANCER SCREENING: Primary | ICD-10-CM

## 2020-09-24 NOTE — TELEPHONE ENCOUNTER
Referral received for colonoscopy for POSITIVE COLOGUARD.   This patient was not  approved by surgery education nurses for meet and greet colonoscopy and will  need a preop or consult appointment.   Patient scheduled for colonoscopy on 10/29 with Dr. Small at [facility] with Gatorade bowel prep.   Instructions given via phone and instructions mailed to patient with surgery handbook.       Message sent to primary care [facility] requesting preop with Annette William.

## 2020-09-24 NOTE — LETTER
September 24, 2020          Jaxson Ramirez  [address] [place] [address]   [place] [address]    Dear Jaxson,     We want to your Colonoscopy to be as pleasant as possible. Please review the instructions below. If you have questions, you may contact us at the any of the following numbers:   [facility] Coordinator: [phone]  Clinic Nurse (Michelle): [phone]  Surgery Education Nurse: [phone]    Date of procedure: 10/29/2020 with Dr. Small  Admit Time: Hospital Surgery will call you the day before your procedure by 5pm with your arrival time. If your surgery is on Monday, expect a call on Friday.  If you are not contacted before 5PM, please call admitting at [phone].   After hours or on weekends, please call [phone] to postpone.   Call the clinic nurse if you become ill within 1 week of your procedure to reschedule.   Preop appointment needed within the 30 days prior to your procedure.   COVID-19 test is needed 4 days before procedure in the morning. This is done as drive-up curbside testing in the white tent on the West side of the [facility] parking lot in your vehicle. Follow the signage and bring your mobile phone if you have one to call the phone number on the sign outside the tent for check-in. If you do not have a cell phone, please call the nurse for instructions on checking in. This has been scheduled for 10/25 at 9:00.     Please  the following over the counter items for your bowel prep:    Two 5 mg Dulcolax (bisacodyl) tablets   One 8.3 ounce (238 g) bottle of miralax   One 10 ounce bottle of magnesium citrate   One 64 ounce bottle of gatorade-Not red, purple, or powdered.    7 DAYS BEFORE THE EXAM:  Call the Surgery Education Nurse at [phone] and have a medication list ready.   Stop Aspirin or NSAIDS (Ibuprofen, Celebrex, Naproxen, etc) 7 days before surgery.  Stop fiber supplements, herbals, vitamins, and iron. Stop eating corn, nuts and seeds.  If you  "are prescribed a daily 81mg Aspirin, you may continue this.  2 DAYS BEFORE THE EXAM:   Low fiber diet.   See list of low fiber foods on page 3 of the \"Miralax, Dulcolax and Magnesium Citrate\" packet.   Drink at least 4-6 large glasses of sports drink today and tomorrow. Avoid red and purple.    1 DAY BEFORE THE EXAM:  No solid food/milk products after 12:01 AM. Drink only clear liquids all day, at least 8-10 glasses.   Please see list of clear liquids on page 2 of \"Miralax, Dulcolax and Magnesium Citrate\" packet.    Avoid anything red or purple. No alcohol.            AT 12:00 PM NOON THE DAY BEFORE EXAM:  Take 2 Dulcolax tablets by mouth with clear liquids.            AT 6:00 PM THE DAY BEFORE EXAM:  Mix the bottle of Miralax and 64 oz. of Gatorade in a pitcher.   Drink one 8 oz. glass every 10-15 minutes until gone. Stay near a toilet.     DAY OF COLONOSCOPY:             6 HOURS PRIOR TO EXAM ON DAY OF PROCEDURE:  Drink the bottle of magnesium citrate followed by a full glass of water.   You may have clear liquids up until  hours before arrival.  If you need to take any medications after this, take them with a tiny sip of water.   If you have asthma, bring your inhaler with you.  Shower before arrival and wear clean, comfortable clothes.   No jewelry, make-up, nail polish, hair spray, lotions, or perfumes.   [place] in Admitting through the [place] [facility].   You must have a responsible adult to drive and to stay with you for 4 hours at home.     TIPS FOR COLON CLEANSING BEFORE YOUR COLONOSCOPY  To get accurate results from your exam, your colon must be completely empty or you may need to repeat the colon prep and exam. If you followed instructions and your stool is clear or yellow liquid, you are ready. If you are not sure if your colon is clean, please call the clinic nurse.    You may use Tucks wipes, hemorrhoid treatments, hydrocortisone cream or alcohol-free baby wipes to ease anal irritation. You may also " use Vaseline to help protect the skin.     Quickly drink each glass. Even when you are sitting on the toilet, keep drinking every 15 minutes. If you have nausea or vomiting, take a break for 30 minutes and then resume drinking.    You will have loose watery stools and may also have chills. Dress for comfort. Expect to feel bloating, nausea and other discomfort until the stool clears from your colon.         Sincerely,    Dennis Small MD/Sophia SEVILLA LPN

## 2020-09-25 PROBLEM — Z12.11 COLON CANCER SCREENING: Status: ACTIVE | Noted: 2020-09-25

## 2020-09-25 PROBLEM — R19.5 POSITIVE COLORECTAL CANCER SCREENING USING COLOGUARD TEST: Status: ACTIVE | Noted: 2020-09-25

## 2020-10-05 NOTE — PROGRESS NOTES
Subjective     Jaxson Ramirez is a 65 year old male who presents to clinic today for the following health issues:    HPI        Hyperlipidemia Follow-Up      Are you regularly taking any medication or supplement to lower your cholesterol?   Yes- Crestor    Are you having muscle aches or other side effects that you think could be caused by your cholesterol lowering medication?  No     He denies chest pain, acute shortness of breath, dizziness, syncope, or palpitations.      Hypertension Follow-up      Do you check your blood pressure regularly outside of the clinic? No , does not trust his cuff.    Are you following a low salt diet? Yes    Are your blood pressures ever more than 140 on the top number (systolic) OR more   than 90 on the bottom number (diastolic), for example 140/90? Does not check  -Taking amlodipine, losartan, and metoprolol without side effects.   -He does smoke, no interest in quitting. Slightly over 1 PPD  -Drinks approximately 6-7 beers a day.    H/O IFG, A1C 5.7 in 11/2019. No numbness, tingling, polyuria, polydipsia or visual changes.     COPD - refuses to do PFT's. Using Breo and Spiriva with good control. He continues to smoke. No interest in quitting. He did have a low dose lung cancer screen in 10/2018. This was negative. Recommended annual screening. Wishing to defer due to pandemic. No cough or cold like symptoms.     #Left buttock pain  -began 1.5 weeks ago, atraumatic.   -Does not radiate and is sharp in quality.  -Rest makes it feel better, activity aggravates.  -Denies fever, chills.   -No bowel or bladder dysfunction  -No injury.     Due for the influenza vaccine. Willing to get.         Review of Systems   CONSTITUTIONAL: NEGATIVE for fever, chills, change in weight  INTEGUMENTARY/SKIN: NEGATIVE for worrisome rashes, moles or lesions  EYES: NEGATIVE for vision changes or irritation  ENT/MOUTH: NEGATIVE for ear, mouth and throat problems  RESP: NEGATIVE for significant cough or  "SOB  CV: NEGATIVE for chest pain, palpitations or peripheral edema  GI: NEGATIVE for nausea, abdominal pain, heartburn, or change in bowel habits  : NEGATIVE for frequency, dysuria, or hematuria  MUSCULOSKELETAL: Left upper buttock pain, atraumatic and non-radiating.  NEURO: NEGATIVE for weakness, dizziness or paresthesias  ENDOCRINE: NEGATIVE for temperature intolerance, skin/hair changes  HEME: NEGATIVE for bleeding problems  PSYCHIATRIC: NEGATIVE for changes in mood or affect      Objective    /84 (BP Location: Right arm, Patient Position: Chair, Cuff Size: Adult Regular)   Pulse 90   Temp 97.8  F (36.6  C) (Tympanic)   Ht 1.626 m (5' 4\")   Wt 74.8 kg (165 lb)   SpO2 99%   BMI 28.32 kg/m    Body mass index is 28.32 kg/m .  Physical Exam   GENERAL: healthy, alert and no distress  EYES: Eyes grossly normal to inspection, PERRL and conjunctivae and sclerae normal  HENT: ear canals and TM's normal, nose and mouth without ulcers or lesions  NECK: no adenopathy, no asymmetry, masses, or scars and thyroid normal to palpation  RESP: lungs clear to auscultation - no rales, rhonchi or wheezes  CV: regular rate and rhythm, normal S1 S2,  no murmur,  no peripheral edema and peripheral pulses strong  ABDOMEN: soft, nontender, no hepatosplenomegaly, no masses and bowel sounds normal  MS: , no edema. No erythema, ecchymosis or edema to left buttock. ROM of LLE elicits pain to left mid buttock. Left buttock Non-tender to touch. Able to walk on tip toes and heels, but did have some left buttock pain. No other musculoskeletal abnormalities.   SKIN: no suspicious lesions or rashes  NEURO: Normal strength and tone, mentation intact and speech normal  PSYCH: mentation appears normal, affect normal/bright    Results for orders placed or performed in visit on 10/07/20 (from the past 24 hour(s))   Hemoglobin A1c   Result Value Ref Range    Hemoglobin A1C 5.5 0 - 5.6 %   Comprehensive metabolic panel (BMP + Alb, Alk Phos, " ALT, AST, Total. Bili, TP)   Result Value Ref Range    Sodium 131 (L) 133 - 144 mmol/L    Potassium 3.8 3.4 - 5.3 mmol/L    Chloride 99 94 - 109 mmol/L    Carbon Dioxide 27 20 - 32 mmol/L    Anion Gap 5 3 - 14 mmol/L    Glucose 109 (H) 70 - 99 mg/dL    Urea Nitrogen 6 (L) 7 - 30 mg/dL    Creatinine 0.50 (L) 0.66 - 1.25 mg/dL    GFR Estimate >90 >60 mL/min/[1.73_m2]    GFR Estimate If Black >90 >60 mL/min/[1.73_m2]    Calcium 9.3 8.5 - 10.1 mg/dL    Bilirubin Total 0.9 0.2 - 1.3 mg/dL    Albumin 4.2 3.4 - 5.0 g/dL    Protein Total 7.9 6.8 - 8.8 g/dL    Alkaline Phosphatase 73 40 - 150 U/L    ALT 36 0 - 70 U/L    AST 32 0 - 45 U/L   Lipid Profile   Result Value Ref Range    Cholesterol 157 <200 mg/dL    Triglycerides 68 <150 mg/dL    HDL Cholesterol 82 >39 mg/dL    LDL Cholesterol Calculated 61 <100 mg/dL    Non HDL Cholesterol 75 <130 mg/dL   Estimated Average Glucose   Result Value Ref Range    Estimated Average Glucose 111 mg/dL           Assessment & Plan     Essential hypertension  Elevated today, even after recheck. Potentially d/t reported increased smoking and ETOH habits. Increase norvasc to 10mg. Continue losartan and metoprolol as ordered. Follow up for BP check and BMP in 2 weeks. Will notify patient of the results when available and intervene accordingly. Then follow up in clinic in 6 months    Tobacco abuse  Continues to smoke approximately 1 PPD, no interest in cessation. Wished to defer his lung CA screening until another time d/t pandemic.       Hyperlipidemia, unspecified hyperlipidemia type  Well controlled, Total cholesterol 157, HDL 82, LDL 61. Continue Crestor as ordered. No side effects from statin therapy. Follow up in 6 months.     Hyperglycemia  HgbA1C 5.5 today. Denies sxs of hyperglycemia. Follow up in 6 months with HgbA1C      Pulmonary emphysema, unspecified emphysema type (H)  No change in respiratory status. Managing with current inhalers along with albuterol inh PRN. O2 sat in clinic  99% RA. Declined PFT's. Follow up in 6 months    Acute left-sided low back pain without sciatica  Atraumatic and non-radiating left buttock pain. No bowel or bladder dysfunction. Rest, ice, heat, Ibuprofen PRN for pain. Call or return to clinic if sx persist, worsen or if there are new concerns.    Need for prophylactic vaccination and inoculation against influenza  Received influenza vaccination today.             Return in about 6 months (around 4/7/2021) for Follow up.    Rolando Ca  Nurse Practitioner Student  [facility]     I was present with the nurse practitioner student who participated in the service and in the documentation of the note. I have verified the history and personally performed the physical exam and medical decision making. I agree with the assessment and plan of care as documented in the note.       Annette William NP  [facility] [place]

## 2020-10-07 ENCOUNTER — OFFICE VISIT (OUTPATIENT)
Dept: FAMILY MEDICINE | Facility: OTHER | Age: 65
End: 2020-10-07
Attending: NURSE PRACTITIONER
Payer: COMMERCIAL

## 2020-10-07 ENCOUNTER — APPOINTMENT (OUTPATIENT)
Dept: LAB | Facility: OTHER | Age: 65
End: 2020-10-07
Attending: NURSE PRACTITIONER
Payer: COMMERCIAL

## 2020-10-07 ENCOUNTER — TELEPHONE (OUTPATIENT)
Dept: SURGERY | Facility: OTHER | Age: 65
End: 2020-10-07

## 2020-10-07 VITALS
TEMPERATURE: 97.8 F | SYSTOLIC BLOOD PRESSURE: 138 MMHG | HEIGHT: 64 IN | DIASTOLIC BLOOD PRESSURE: 84 MMHG | HEART RATE: 90 BPM | BODY MASS INDEX: 28.17 KG/M2 | OXYGEN SATURATION: 99 % | WEIGHT: 165 LBS

## 2020-10-07 DIAGNOSIS — Z72.0 TOBACCO ABUSE: ICD-10-CM

## 2020-10-07 DIAGNOSIS — M54.50 ACUTE LEFT-SIDED LOW BACK PAIN WITHOUT SCIATICA: ICD-10-CM

## 2020-10-07 DIAGNOSIS — R73.9 HYPERGLYCEMIA: ICD-10-CM

## 2020-10-07 DIAGNOSIS — I10 ESSENTIAL HYPERTENSION: Primary | ICD-10-CM

## 2020-10-07 DIAGNOSIS — E78.5 HYPERLIPIDEMIA, UNSPECIFIED HYPERLIPIDEMIA TYPE: ICD-10-CM

## 2020-10-07 DIAGNOSIS — Z23 NEED FOR PROPHYLACTIC VACCINATION AND INOCULATION AGAINST INFLUENZA: ICD-10-CM

## 2020-10-07 DIAGNOSIS — Z78.9 ALCOHOL CONSUMPTION HEAVY: ICD-10-CM

## 2020-10-07 DIAGNOSIS — J43.9 PULMONARY EMPHYSEMA, UNSPECIFIED EMPHYSEMA TYPE (H): ICD-10-CM

## 2020-10-07 LAB
ALBUMIN SERPL-MCNC: 4.2 G/DL (ref 3.4–5)
ALP SERPL-CCNC: 73 U/L (ref 40–150)
ALT SERPL W P-5'-P-CCNC: 36 U/L (ref 0–70)
ANION GAP SERPL CALCULATED.3IONS-SCNC: 5 MMOL/L (ref 3–14)
AST SERPL W P-5'-P-CCNC: 32 U/L (ref 0–45)
BILIRUB SERPL-MCNC: 0.9 MG/DL (ref 0.2–1.3)
BUN SERPL-MCNC: 6 MG/DL (ref 7–30)
CALCIUM SERPL-MCNC: 9.3 MG/DL (ref 8.5–10.1)
CHLORIDE SERPL-SCNC: 99 MMOL/L (ref 94–109)
CHOLEST SERPL-MCNC: 157 MG/DL
CO2 SERPL-SCNC: 27 MMOL/L (ref 20–32)
CREAT SERPL-MCNC: 0.5 MG/DL (ref 0.66–1.25)
EST. AVERAGE GLUCOSE BLD GHB EST-MCNC: 111 MG/DL
GFR SERPL CREATININE-BSD FRML MDRD: >90 ML/MIN/{1.73_M2}
GLUCOSE SERPL-MCNC: 109 MG/DL (ref 70–99)
HBA1C MFR BLD: 5.5 % (ref 0–5.6)
HDLC SERPL-MCNC: 82 MG/DL
LDLC SERPL CALC-MCNC: 61 MG/DL
NONHDLC SERPL-MCNC: 75 MG/DL
POTASSIUM SERPL-SCNC: 3.8 MMOL/L (ref 3.4–5.3)
PROT SERPL-MCNC: 7.9 G/DL (ref 6.8–8.8)
SODIUM SERPL-SCNC: 131 MMOL/L (ref 133–144)
TRIGL SERPL-MCNC: 68 MG/DL

## 2020-10-07 PROCEDURE — 999N001182 HC STATISTIC ESTIMATED AVERAGE GLUCOSE: Mod: ZL | Performed by: NURSE PRACTITIONER

## 2020-10-07 PROCEDURE — 80061 LIPID PANEL: CPT | Mod: ZL | Performed by: NURSE PRACTITIONER

## 2020-10-07 PROCEDURE — G0008 ADMIN INFLUENZA VIRUS VAC: HCPCS

## 2020-10-07 PROCEDURE — 99214 OFFICE O/P EST MOD 30 MIN: CPT | Performed by: NURSE PRACTITIONER

## 2020-10-07 PROCEDURE — 80053 COMPREHEN METABOLIC PANEL: CPT | Mod: ZL | Performed by: NURSE PRACTITIONER

## 2020-10-07 PROCEDURE — G0463 HOSPITAL OUTPT CLINIC VISIT: HCPCS | Mod: 25

## 2020-10-07 PROCEDURE — 83036 HEMOGLOBIN GLYCOSYLATED A1C: CPT | Mod: ZL | Performed by: NURSE PRACTITIONER

## 2020-10-07 RX ORDER — ROSUVASTATIN CALCIUM 20 MG/1
20 TABLET, COATED ORAL DAILY
Qty: 90 TABLET | Refills: 3 | Status: SHIPPED | OUTPATIENT
Start: 2020-10-07 | End: 2021-11-26

## 2020-10-07 RX ORDER — AMLODIPINE BESYLATE 10 MG/1
10 TABLET ORAL DAILY
Qty: 90 TABLET | Refills: 1 | Status: SHIPPED | OUTPATIENT
Start: 2020-10-07 | End: 2020-10-21

## 2020-10-07 ASSESSMENT — MIFFLIN-ST. JEOR: SCORE: 1439.44

## 2020-10-07 ASSESSMENT — PAIN SCALES - GENERAL: PAINLEVEL: NO PAIN (0)

## 2020-10-07 NOTE — NURSING NOTE
"Chief Complaint   Patient presents with     Hypertension     Lipids       Initial BP (!) 140/80 (BP Location: Right arm, Patient Position: Chair, Cuff Size: Adult Regular)   Pulse 90   Temp 97.8  F (36.6  C) (Tympanic)   Ht 1.626 m (5' 4\")   Wt 74.8 kg (165 lb)   SpO2 99%   BMI 28.32 kg/m   Estimated body mass index is 28.32 kg/m  as calculated from the following:    Height as of this encounter: 1.626 m (5' 4\").    Weight as of this encounter: 74.8 kg (165 lb).  Medication Reconciliation: complete  Francesca Ceballos LPN  "

## 2020-10-07 NOTE — TELEPHONE ENCOUNTER
Patient called and wishes to cancel his procedure that is scheduled for October 29, 2020 with Dr Small.  He states he wishes to cancel because of Covid-19.   His covid appointment has been canceled also.  Surgery was notified of the cancellation.  RONALDO SINGLETON LPN

## 2020-10-21 ENCOUNTER — ALLIED HEALTH/NURSE VISIT (OUTPATIENT)
Dept: FAMILY MEDICINE | Facility: OTHER | Age: 65
End: 2020-10-21
Attending: NURSE PRACTITIONER
Payer: COMMERCIAL

## 2020-10-21 VITALS — DIASTOLIC BLOOD PRESSURE: 70 MMHG | OXYGEN SATURATION: 95 % | SYSTOLIC BLOOD PRESSURE: 122 MMHG | HEART RATE: 76 BPM

## 2020-10-21 DIAGNOSIS — I10 ESSENTIAL HYPERTENSION: Primary | ICD-10-CM

## 2020-10-21 DIAGNOSIS — I10 ESSENTIAL HYPERTENSION: ICD-10-CM

## 2020-10-21 LAB
ANION GAP SERPL CALCULATED.3IONS-SCNC: 5 MMOL/L (ref 3–14)
BUN SERPL-MCNC: 7 MG/DL (ref 7–30)
CALCIUM SERPL-MCNC: 9.3 MG/DL (ref 8.5–10.1)
CHLORIDE SERPL-SCNC: 99 MMOL/L (ref 94–109)
CO2 SERPL-SCNC: 27 MMOL/L (ref 20–32)
CREAT SERPL-MCNC: 0.5 MG/DL (ref 0.66–1.25)
GFR SERPL CREATININE-BSD FRML MDRD: >90 ML/MIN/{1.73_M2}
GLUCOSE SERPL-MCNC: 123 MG/DL (ref 70–99)
POTASSIUM SERPL-SCNC: 4 MMOL/L (ref 3.4–5.3)
SODIUM SERPL-SCNC: 131 MMOL/L (ref 133–144)

## 2020-10-21 PROCEDURE — 36415 COLL VENOUS BLD VENIPUNCTURE: CPT | Mod: ZL | Performed by: NURSE PRACTITIONER

## 2020-10-21 PROCEDURE — 80048 BASIC METABOLIC PNL TOTAL CA: CPT | Mod: ZL | Performed by: NURSE PRACTITIONER

## 2020-10-21 PROCEDURE — 99207 PR NO CHARGE NURSE ONLY: CPT

## 2020-10-21 RX ORDER — AMLODIPINE BESYLATE 10 MG/1
10 TABLET ORAL DAILY
Qty: 90 TABLET | Refills: 1 | Status: SHIPPED | OUTPATIENT
Start: 2020-10-21 | End: 2021-04-07

## 2020-10-21 NOTE — PROGRESS NOTES
Patient in clinic for outpatient BP reading.   The patient was in a sitting position with feet on the floor - Yes  Has the patient smoked in the last 15 minutes - No  Has the patient exercised within the last 15 minutes - No  Patient was instructed to not cross legs or talk during the procedure - Yes  Patients arm was palm upward, slightly flexed and with the whole arm supported at heart level Yes  Cuff size was measured and is appropriate size for patient using established guideline - Yes  ?  The BP reading today was 142/74  and pulse was 83 .    Re check B/P in 15 minutes     Patient in clinic for outpatient BP reading.   The patient was in a sitting position with feet on the floor - Yes  Has the patient smoked in the last 15 minutes - No  Has the patient exercised within the last 15 minutes - No  The patient rested in the room for 15  minutes before the BP was taken  Patient was instructed to not cross legs or talk during the procedure - Yes  Patients arm was palm upward, slightly flexed and with the whole arm supported at heart level Yes  Cuff size was measured and is appropriate size for patient using established guideline - Yes  ?  The BP reading today was 122/70 and pulse was 76.

## 2021-03-25 ENCOUNTER — IMMUNIZATION (OUTPATIENT)
Dept: FAMILY MEDICINE | Facility: OTHER | Age: 66
End: 2021-03-25
Attending: NURSE PRACTITIONER
Payer: COMMERCIAL

## 2021-03-25 PROCEDURE — 91300 PR COVID VAC PFIZER DIL RECON 30 MCG/0.3 ML IM: CPT

## 2021-04-06 NOTE — PROGRESS NOTES
Assessment & Plan     Pulmonary emphysema, unspecified emphysema type (H)  Controlled. Breathing well. Oxygen sats 96 percent. Will continue the Breo Ellipta. Smoking cessation encouraged. F/up 6 months, sooner with new or worsening symptoms.     - fluticasone-vilanterol (BREO ELLIPTA) 100-25 MCG/INH inhaler; Inhale 1 puff by mouth once daily  - albuterol (PROAIR HFA/PROVENTIL HFA/VENTOLIN HFA) 108 (90 Base) MCG/ACT inhaler; INHALE 2 PUFFS BY MOUTH EVERY 4 HOURS AS NEEDED FOR WHEEZING AND FOR SHORTNESS OF BREATH SHAKE BEFORE USING.    Essential hypertension  Well controlled. Continue current medications. Encouraged daily exercise and a low sodium diet. Recommended checking BP's 2x/wk, call the clinic if consistantly s>140 or d>90. Follow up in 6 months.     - amLODIPine (NORVASC) 10 MG tablet; Take 1 tablet (10 mg) by mouth daily  - metoprolol succinate ER (TOPROL-XL) 200 MG 24 hr tablet; Take 1 tablet (200 mg) by mouth daily  - Comprehensive metabolic panel (BMP + Alb, Alk Phos, ALT, AST, Total. Bili, TP); Future    Tobacco abuse  Cessation encouraged. Smoking 1-2 ppd.     Hyperlipidemia, unspecified hyperlipidemia type  Tolerating Crestor. Will continue. AST/ALT normal in 10/2020.     - Lipid Profile; Future    Hyperglycemia  A1C pending. Reviewed pathogenesis of pre-diabetes. Stressed importance of cutting out sugars/carbs and engaging in routine physical exercise for 30 minutes/5 days of work with the goal of gradual weight loss.    - Hemoglobin A1c; Future    Rash  Appears like a psoriasis. Kenalog cream has worked well in the past. Will reorder. He was also encouraged to apply with an Aquafor cream. Will avoid using the steroid cream for more than 2 weeks. Return if rash does not resolve in 2-4 weeks or worsens.     - triamcinolone (KENALOG) 0.1 % external ointment; Apply topically 2 times daily    Alcohol consumption heavy  Drinking 6-8 beers nightly. Encouraged to limit.     Positive Cologuard:  I  "noticed that he never had his colonoscopy after he left. Cologuard was positive in 10/2020. Canceled colonoscopy. Will have nursing staff call him to rescheduled.     Screen Prostate Cancer  PSA pending. Will notify patient of the results when available and intervene accordingly.     Tobacco Cessation:   reports that he has been smoking. He has been smoking about 1.00 pack per day. He has never used smokeless tobacco.      BMI:   Estimated body mass index is 28.05 kg/m  as calculated from the following:    Height as of this encounter: 1.626 m (5' 4\").    Weight as of this encounter: 74.1 kg (163 lb 6.4 oz).         Annette William NP  [facility] - [place] Puri is a 65 year old who presents for the following health issues    HPI     Hyperlipidemia Follow-Up      Are you regularly taking any medication or supplement to lower your cholesterol?   Yes- Crestor    Are you having muscle aches or other side effects that you think could be caused by your cholesterol lowering medication?  Yes- Crestor     He denies chest pain, acute shortness of breath, dizziness, syncope, or palpitations.     Also takes asa 81 mg.     Hypertension Follow-up      Do you check your blood pressure regularly outside of the clinic? No     Are you following a low salt diet? Yes    Are your blood pressures ever more than 140 on the top number (systolic) OR more   than 90 on the bottom number (diastolic), for example 140/90? No     -Taking amlodipine, losartan, and metoprolol without side effects.   -He does smoke, no interest in quitting. Slightly over 1 PPD  -Drinks approximately 6-7 beers a day.  -As noted above, he denies chest pain, acute shortness of breath, dizziness, syncope, or palpitations.      H/O IFG, A1C 5.5 in 10/2020. No numbness, tingling, polyuria, polydipsia or visual changes.    COPD - refuses to do PFT's. Using Breo with good control. Used to take Spiriva, but insurance will no longer cover this. " "He continues to smoke. No interest in quitting. He did have a low dose lung cancer screen in 10/2018. This was negative. Recommended annual screening. Wishing to defer due to pandemic. No cough or cold like symptoms.     Rash  Onset/Duration: months  Description  Location: right shin  Character: round, raised, red, pruritic   Itching: mild  Intensity:  mild  Progression of Symptoms:  same  Accompanying signs and symptoms:   Fever: no  Body aches or joint pain: no  Sore throat symptoms: no  Recent cold symptoms: no  History:           Previous episodes of similar rash: yes, steroid cream helped  New exposures:  None  Recent travel: no  Exposure to similar rash: no  Precipitating or alleviating factors: Kenalog cream  Therapies tried and outcome: Kenalog cream with some relief.          How many servings of fruits and vegetables do you eat daily?  2-3    On average, how many sweetened beverages do you drink each day (Examples: soda, juice, sweet tea, etc.  Do NOT count diet or artificially sweetened beverages)?   0    How many days per week do you exercise enough to make your heart beat faster? 3 or less    How many minutes a day do you exercise enough to make your heart beat faster? 20 - 29    How many days per week do you miss taking your medication? 0      Review of Systems   As noted in the HPI.       Objective    /72 (BP Location: Left arm, Patient Position: Sitting, Cuff Size: Adult Regular)   Pulse 83   Temp 98.4  F (36.9  C) (Tympanic)   Resp 16   Ht 1.626 m (5' 4\")   Wt 74.1 kg (163 lb 6.4 oz)   SpO2 96%   BMI 28.05 kg/m    Body mass index is 28.05 kg/m .  Physical Exam   GENERAL: healthy, alert and no distress  EYES: Eyes grossly normal to inspection, PERRL and conjunctivae and sclerae normal  HENT: ear canals and TM's normal, nose and mouth without ulcers or lesions  NECK: no adenopathy, no asymmetry, masses, or scars and thyroid normal to palpation  RESP: lungs clear to auscultation - no " rales, rhonchi or wheezes  CV: regular rate and rhythm, normal S1 S2, no S3 or S4, no murmur, click or rub, no peripheral edema  ABDOMEN: soft, nontender, no masses and bowel sounds normal  NEURO: Normal strength and tone, mentation intact and speech normal  PSYCH: mentation appears normal, affect normal/bright  SKIN: patient with excoriated erythematous rash on right shin, see below            Results for orders placed or performed in visit on 04/07/21 (from the past 24 hour(s))   Basic metabolic panel   Result Value Ref Range    Sodium 134 133 - 144 mmol/L    Potassium 4.2 3.4 - 5.3 mmol/L    Chloride 100 94 - 109 mmol/L    Carbon Dioxide 28 20 - 32 mmol/L    Anion Gap 6 3 - 14 mmol/L    Glucose 103 (H) 70 - 99 mg/dL    Urea Nitrogen 7 7 - 30 mg/dL    Creatinine 0.58 (L) 0.66 - 1.25 mg/dL    GFR Estimate >90 >60 mL/min/[1.73_m2]    GFR Estimate If Black >90 >60 mL/min/[1.73_m2]    Calcium 9.5 8.5 - 10.1 mg/dL

## 2021-04-07 ENCOUNTER — TELEPHONE (OUTPATIENT)
Dept: FAMILY MEDICINE | Facility: OTHER | Age: 66
End: 2021-04-07

## 2021-04-07 ENCOUNTER — OFFICE VISIT (OUTPATIENT)
Dept: FAMILY MEDICINE | Facility: OTHER | Age: 66
End: 2021-04-07
Attending: NURSE PRACTITIONER
Payer: COMMERCIAL

## 2021-04-07 VITALS
RESPIRATION RATE: 16 BRPM | SYSTOLIC BLOOD PRESSURE: 130 MMHG | HEART RATE: 83 BPM | HEIGHT: 64 IN | OXYGEN SATURATION: 96 % | BODY MASS INDEX: 27.9 KG/M2 | WEIGHT: 163.4 LBS | TEMPERATURE: 98.4 F | DIASTOLIC BLOOD PRESSURE: 72 MMHG

## 2021-04-07 DIAGNOSIS — J43.9 PULMONARY EMPHYSEMA, UNSPECIFIED EMPHYSEMA TYPE (H): Primary | ICD-10-CM

## 2021-04-07 DIAGNOSIS — R19.5 POSITIVE COLORECTAL CANCER SCREENING USING COLOGUARD TEST: ICD-10-CM

## 2021-04-07 DIAGNOSIS — R21 RASH: ICD-10-CM

## 2021-04-07 DIAGNOSIS — R73.9 HYPERGLYCEMIA: ICD-10-CM

## 2021-04-07 DIAGNOSIS — Z12.5 SCREENING FOR PROSTATE CANCER: ICD-10-CM

## 2021-04-07 DIAGNOSIS — Z78.9 ALCOHOL CONSUMPTION HEAVY: ICD-10-CM

## 2021-04-07 DIAGNOSIS — I10 ESSENTIAL HYPERTENSION: ICD-10-CM

## 2021-04-07 DIAGNOSIS — E78.5 HYPERLIPIDEMIA, UNSPECIFIED HYPERLIPIDEMIA TYPE: ICD-10-CM

## 2021-04-07 DIAGNOSIS — Z72.0 TOBACCO ABUSE: ICD-10-CM

## 2021-04-07 LAB
ANION GAP SERPL CALCULATED.3IONS-SCNC: 6 MMOL/L (ref 3–14)
BUN SERPL-MCNC: 7 MG/DL (ref 7–30)
CALCIUM SERPL-MCNC: 9.5 MG/DL (ref 8.5–10.1)
CHLORIDE SERPL-SCNC: 100 MMOL/L (ref 94–109)
CO2 SERPL-SCNC: 28 MMOL/L (ref 20–32)
CREAT SERPL-MCNC: 0.58 MG/DL (ref 0.66–1.25)
EST. AVERAGE GLUCOSE BLD GHB EST-MCNC: 105 MG/DL
GFR SERPL CREATININE-BSD FRML MDRD: >90 ML/MIN/{1.73_M2}
GLUCOSE SERPL-MCNC: 103 MG/DL (ref 70–99)
HBA1C MFR BLD: 5.3 % (ref 0–5.6)
POTASSIUM SERPL-SCNC: 4.2 MMOL/L (ref 3.4–5.3)
PSA SERPL-ACNC: 1.38 UG/L (ref 0–4)
SODIUM SERPL-SCNC: 134 MMOL/L (ref 133–144)

## 2021-04-07 PROCEDURE — 999N001182 HC STATISTIC ESTIMATED AVERAGE GLUCOSE: Mod: ZL | Performed by: NURSE PRACTITIONER

## 2021-04-07 PROCEDURE — G0103 PSA SCREENING: HCPCS | Mod: ZL | Performed by: NURSE PRACTITIONER

## 2021-04-07 PROCEDURE — 99214 OFFICE O/P EST MOD 30 MIN: CPT | Performed by: NURSE PRACTITIONER

## 2021-04-07 PROCEDURE — G0463 HOSPITAL OUTPT CLINIC VISIT: HCPCS

## 2021-04-07 PROCEDURE — 36415 COLL VENOUS BLD VENIPUNCTURE: CPT | Mod: ZL | Performed by: NURSE PRACTITIONER

## 2021-04-07 PROCEDURE — 83036 HEMOGLOBIN GLYCOSYLATED A1C: CPT | Mod: ZL | Performed by: NURSE PRACTITIONER

## 2021-04-07 PROCEDURE — 80048 BASIC METABOLIC PNL TOTAL CA: CPT | Mod: ZL | Performed by: NURSE PRACTITIONER

## 2021-04-07 RX ORDER — METOPROLOL SUCCINATE 200 MG/1
200 TABLET, EXTENDED RELEASE ORAL DAILY
Qty: 90 TABLET | Refills: 3 | Status: SHIPPED | OUTPATIENT
Start: 2021-04-07 | End: 2022-05-24

## 2021-04-07 RX ORDER — AMLODIPINE BESYLATE 10 MG/1
10 TABLET ORAL DAILY
Qty: 90 TABLET | Refills: 3 | Status: SHIPPED | OUTPATIENT
Start: 2021-04-07 | End: 2022-06-21

## 2021-04-07 RX ORDER — TRIAMCINOLONE ACETONIDE 1 MG/G
OINTMENT TOPICAL 2 TIMES DAILY
Qty: 80 G | Refills: 1 | Status: SHIPPED | OUTPATIENT
Start: 2021-04-07 | End: 2022-10-21

## 2021-04-07 RX ORDER — ALBUTEROL SULFATE 90 UG/1
AEROSOL, METERED RESPIRATORY (INHALATION)
Qty: 18 G | Refills: 3 | Status: SHIPPED | OUTPATIENT
Start: 2021-04-07 | End: 2022-05-24

## 2021-04-07 ASSESSMENT — PAIN SCALES - GENERAL: PAINLEVEL: NO PAIN (0)

## 2021-04-07 ASSESSMENT — MIFFLIN-ST. JEOR: SCORE: 1432.18

## 2021-04-07 NOTE — NURSING NOTE
"Chief Complaint   Patient presents with     Hypertension       Initial There were no vitals taken for this visit. Estimated body mass index is 28.32 kg/m  as calculated from the following:    Height as of 10/7/20: 1.626 m (5' 4\").    Weight as of 10/7/20: 74.8 kg (165 lb).  Medication Reconciliation: complete  Francesca Ceballos LPN  "

## 2021-04-07 NOTE — NURSING NOTE
"Chief Complaint   Patient presents with     Hypertension       Initial /72 (BP Location: Left arm, Patient Position: Sitting, Cuff Size: Adult Regular)   Pulse 83   Temp 98.4  F (36.9  C) (Tympanic)   Resp 16   Ht 1.626 m (5' 4\")   Wt 74.1 kg (163 lb 6.4 oz)   SpO2 96%   BMI 28.05 kg/m   Estimated body mass index is 28.05 kg/m  as calculated from the following:    Height as of this encounter: 1.626 m (5' 4\").    Weight as of this encounter: 74.1 kg (163 lb 6.4 oz).  Medication Reconciliation: complete  Catalina Mcfarland MA  "

## 2021-04-07 NOTE — TELEPHONE ENCOUNTER
Spoke with patient regarding rescheduling his colonoscopy . Patient stated that he wanted to wait a while longer until COVID is better . He will call back when he's ready to schedule .

## 2021-04-13 ENCOUNTER — IMMUNIZATION (OUTPATIENT)
Dept: FAMILY MEDICINE | Facility: OTHER | Age: 66
End: 2021-04-13
Attending: FAMILY MEDICINE
Payer: COMMERCIAL

## 2021-04-13 PROCEDURE — 0002A PR COVID VAC PFIZER DIL RECON 30 MCG/0.3 ML IM: CPT

## 2021-05-12 DIAGNOSIS — I10 ESSENTIAL HYPERTENSION: ICD-10-CM

## 2021-05-12 RX ORDER — LOSARTAN POTASSIUM 100 MG/1
TABLET ORAL
Qty: 90 TABLET | Refills: 0 | Status: SHIPPED | OUTPATIENT
Start: 2021-05-12 | End: 2021-08-02

## 2021-05-12 NOTE — TELEPHONE ENCOUNTER
Losartan 100 mg      Last Written Prescription Date:  7-7-2020  Last Fill Quantity: 90,   # refills: 1  Last Office Visit: 4-7-2021

## 2021-08-02 DIAGNOSIS — I10 ESSENTIAL HYPERTENSION: ICD-10-CM

## 2021-08-02 RX ORDER — LOSARTAN POTASSIUM 100 MG/1
TABLET ORAL
Qty: 90 TABLET | Refills: 0 | Status: SHIPPED | OUTPATIENT
Start: 2021-08-02 | End: 2021-11-03

## 2021-08-02 NOTE — TELEPHONE ENCOUNTER
Losartan Potassium 100 MG Oral Tablet  Last Written Prescription Date:  5/12/2021  Last Fill Quantity: 90,   # refills: 0  Last Office Visit: 4/7/2021  Future Office visit:    Next 5 appointments (look out 90 days)    Oct 11, 2021  8:20 AM  (Arrive by 8:05 AM)  SHORT with Annette William NP  [facility] [place] ([facility] [place] ) [address]  [place] [address]  [phone]           Routing refill request to provider for review/approval because:

## 2021-09-18 DIAGNOSIS — J43.9 PULMONARY EMPHYSEMA, UNSPECIFIED EMPHYSEMA TYPE (H): ICD-10-CM

## 2021-09-20 NOTE — TELEPHONE ENCOUNTER
Bety Anand  100-25    Last Written Prescription Date:  4-7-2021   Last Fill Quantity: 60,   # refills: 3    Future Office visit:    Next 5 appointments (look out 90 days)    Oct 11, 2021  8:20 AM  (Arrive by 8:05 AM)  SHORT with Annette William NP  [facility] [place] ([facility] [place] ) [address]  [place] [address]  [phone]

## 2021-10-08 NOTE — PROGRESS NOTES
Assessment & Plan     Pulmonary emphysema, unspecified emphysema type (H)  Breathing was slightly worse with the smoke in the air over the summer months. Now better. Will update PFTs and do the low dose chest CT. Will notify patient of the results when available and intervene accordingly. Continues to smoke 1 ppd. Cessation encouraged. Will continue the Breo for now.     - General PFT Lab (Please always keep checked); Future  - Pulmonary Function Test; Future  - General PFT Lab (Please always keep checked)    Essential hypertension  Well controlled. Continue current medications. Encouraged daily exercise and a low sodium diet. Recommended checking BP's 2x/wk, call the clinic if consistantly s>140 or d>90. Follow up in 6 months.     Tobacco abuse  Cessation encouraged. Currently smoking 1 ppd.     Hyperlipidemia, unspecified hyperlipidemia type  Lipids well controlled. Will continue statin. AST and ALT normal today.     Hyperglycemia  A1C normal at 5.4. Reviewed pathogenesis of pre-diabetes. Stressed importance of cutting out sugars/carbs and engaging in routine physical exercise for 30 minutes/5 days of work with the goal of gradual weight loss. Will recheck A1C in 6 months.     Alcohol consumption heavy  Drinking 10 beers per night. Cessation encouraged.     Positive colorectal cancer screening using Cologuard test  Needs colonoscopy. Declined in the past, but now willing. Referral placed to surgery.     - Adult General Surg Referral    Personal history of tobacco use  - Prof fee: Shared Decisionmaking for Lung Cancer Screening  - CT Chest Lung Cancer Scrn Low Dose wo; Future  -Will notify patient of the results when available and intervene accordingly.   [id number]     Tobacco Cessation:   reports that he has been smoking. He has been smoking about 1.00 pack per day. He has never used smokeless tobacco.  Tobacco Cessation Action Plan: Information offered: Patient not interested at this time    BMI:   Estimated  "body mass index is 27.98 kg/m  as calculated from the following:    Height as of 4/7/21: 1.626 m (5' 4\").    Weight as of this encounter: 73.9 kg (163 lb).       Return in about 6 months (around 4/11/2022).    Annette William NP  [facility] - SUZANNE Puri is a 66 year old who presents for the following health issues    HPI     Hyperlipidemia Follow-Up      Are you regularly taking any medication or supplement to lower your cholesterol?   Yes, Crestor 20 mg; rarely forgets    Are you having muscle aches or other side effects that you think could be caused by your cholesterol lowering medication?  No     Denies chest pain, dizziness, syncope, or palpitations. No lower leg swelling. Some shortness of breath r/t his COPD.     On asa.     H/O pre-DM, A1C was normal in 4/2021.     Hypertension Follow-up      Do you check your blood pressure regularly outside of the clinic? No     Are you following a low salt diet? Yes- doesn't add extra salt    Are your blood pressures ever more than 140 on the top number (systolic) OR more   than 90 on the bottom number (diastolic), for example 140/90? No   -Taking metoprolol succinate 200 mg, Norvasc 10 mg, and losartan 100 mg without side effects.   -As noted above, he denies chest pain, dizziness, syncope, or palpitations. Some shortness of breath r/t his COPD. No lower leg swelling.   -Does consume large amounts of alcohol, drinking 10 beers per night.     COPD Follow-Up    Overall, how are your COPD symptoms since your last clinic visit?  Slightly worse    How much fatigue or shortness of breath do you have when you are walking?  Same as usual    How much shortness of breath do you have when you are resting?  Same as usual    How often do you cough? Sometimes    Have you noticed any change in your sputum/phlegm?  No    Have you experienced a recent fever? No    Please describe how far you can walk without stopping to rest:  2-5 blocks    How many " flights of stairs are you able to walk up without stopping?  2    Have you had any Emergency Room Visits, Urgent Care Visits, or Hospital Admissions because of your COPD since your last office visit?  No     He has never done PFTs.     Using Breo without side effects.     Was using Spiriva in the past, but insurance would no longer cover this.     He continues to smoke, currently smoking 1 ppd.     He did have a low dose chest CT in 2018, this was negative. Willing to repeat.     Denies any chest pain, but notes that he was more short of breath this summer when there was a lot of smoke in the air.     Due for the influenza vaccine. Willing to get.     He did have positive Cologuard in 10/2020 and has never had a colonoscopy. This was scheduled, but he canceled. He is now willing to schedule. No abdominal pain. No nausea or vomiting. No fevers. No melena.     History   Smoking Status     Current Every Day Smoker     Packs/day: 1.00   Smokeless Tobacco     Never Used     Comment: pt denied QP 12/4/19     No results found for: FEV1, TDP3DEI      How many servings of fruits and vegetables do you eat daily?  0-1    On average, how many sweetened beverages do you drink each day (Examples: soda, juice, sweet tea, etc.  Do NOT count diet or artificially sweetened beverages)?   0    How many days per week do you exercise enough to make your heart beat faster? 3 or less    How many minutes a day do you exercise enough to make your heart beat faster? 9 or less  How many days per week do you miss taking your medication? 1    What makes it hard for you to take your medications?  remembering to take      Review of Systems   As noted in the HPI.       Objective    /78   Pulse 82   Temp 97.6  F (36.4  C)   Resp 18   Wt 73.9 kg (163 lb)   SpO2 95%   BMI 27.98 kg/m    Body mass index is 27.98 kg/m .  Physical Exam   GENERAL: healthy, alert and no distress  EYES: Eyes grossly normal to inspection, PERRL and conjunctivae  and sclerae normal  HENT: ear canals and TM's normal, nose and mouth without ulcers or lesions  NECK: no adenopathy, no asymmetry, masses, or scars and thyroid normal to palpation  RESP: lungs clear to auscultation - no rales, rhonchi or wheezes  CV: regular rate and rhythm, no murmur,  no peripheral edema and peripheral pulses strong  ABDOMEN: soft, nontender, no masses and bowel sounds normal  NEURO: Normal strength and tone, mentation intact and speech normal  PSYCH: mentation appears normal, affect normal/bright    Results for orders placed or performed in visit on 10/11/21 (from the past 24 hour(s))   Hemoglobin A1c   Result Value Ref Range    Estimated Average Glucose 108 mg/dL    Hemoglobin A1C 5.4 0.0 - 5.6 %   Lipid Profile   Result Value Ref Range    Cholesterol 177 <200 mg/dL    Triglycerides 58 <150 mg/dL    Direct Measure HDL 95 >=40 mg/dL    LDL Cholesterol Calculated 70 <=100 mg/dL    Non HDL Cholesterol 82 <130 mg/dL    Patient Fasting > 8hrs? Yes     Narrative    Cholesterol  Desirable:  <200 mg/dL    Triglycerides  Normal:  Less than 150 mg/dL  Borderline High:  150-199 mg/dL  High:  200-499 mg/dL  Very High:  Greater than or equal to 500 mg/dL    Direct Measure HDL  Female:  Greater than or equal to 50 mg/dL   Male:  Greater than or equal to 40 mg/dL    LDL Cholesterol  Desirable:  <100mg/dL  Above Desirable:  100-129 mg/dL   Borderline High:  130-159 mg/dL   High:  160-189 mg/dL   Very High:  >= 190 mg/dL    Non HDL Cholesterol  Desirable:  130 mg/dL  Above Desirable:  130-159 mg/dL  Borderline High:  160-189 mg/dL  High:  190-219 mg/dL  Very High:  Greater than or equal to 220 mg/dL   Comprehensive metabolic panel (BMP + Alb, Alk Phos, ALT, AST, Total. Bili, TP)   Result Value Ref Range    Sodium 132 (L) 133 - 144 mmol/L    Potassium 3.9 3.4 - 5.3 mmol/L    Chloride 99 94 - 109 mmol/L    Carbon Dioxide (CO2) 25 20 - 32 mmol/L    Anion Gap 8 3 - 14 mmol/L    Urea Nitrogen 5 (L) 7 - 30 mg/dL     Creatinine 0.53 (L) 0.66 - 1.25 mg/dL    Calcium 9.4 8.5 - 10.1 mg/dL    Glucose 126 (H) 70 - 99 mg/dL    Alkaline Phosphatase 63 40 - 150 U/L    AST 33 0 - 45 U/L    ALT 41 0 - 70 U/L    Protein Total 8.0 6.8 - 8.8 g/dL    Albumin 4.1 3.4 - 5.0 g/dL    Bilirubin Total 0.8 0.2 - 1.3 mg/dL    GFR Estimate >90 >60 mL/min/1.73m2             Lung Cancer Screening Shared Decision Making Visit     Jaxson Ramirez is eligible for lung cancer screening on the basis of the information provided in my signed lung cancer screening order.     I have discussed with patient the risks and benefits of screening for lung cancer with low-dose CT.     The risks include:  radiation exposure: one low dose chest CT has as much ionizing radiation as about 15 chest x-rays or 6 months of background radiation living in [place]    false positives: 96% of positive findings/nodules are NOT cancer, but some might still require additional diagnostic evaluation, including biopsy  over-diagnosis: some slow growing cancers that might never have been clinically significant will be detected and treated unnecessarily     The benefit of early detection of lung cancer is contingent upon adherence to annual screening or more frequent follow up if indicated.     Furthermore, reaping the benefits of screening requires Jaxson Ramirez to be willing and physically able to undergo diagnostic procedures, if indicated. Although no specific guide is available for determining severity of comorbidities, it is reasonable to withhold screening in patients who have greater mortality risk from other diseases.     We did discuss that the only way to prevent lung cancer is to not smoke. Smoking cessation counseling was given, duration < 3 minutes.      I did offer risk estimation using a calculator such as this one:    ShouldIScreen

## 2021-10-11 ENCOUNTER — OFFICE VISIT (OUTPATIENT)
Dept: FAMILY MEDICINE | Facility: OTHER | Age: 66
End: 2021-10-11
Attending: NURSE PRACTITIONER
Payer: COMMERCIAL

## 2021-10-11 ENCOUNTER — LAB (OUTPATIENT)
Dept: LAB | Facility: OTHER | Age: 66
End: 2021-10-11
Attending: NURSE PRACTITIONER
Payer: COMMERCIAL

## 2021-10-11 VITALS
DIASTOLIC BLOOD PRESSURE: 78 MMHG | SYSTOLIC BLOOD PRESSURE: 112 MMHG | OXYGEN SATURATION: 95 % | TEMPERATURE: 97.6 F | RESPIRATION RATE: 18 BRPM | WEIGHT: 163 LBS | BODY MASS INDEX: 27.98 KG/M2 | HEART RATE: 82 BPM

## 2021-10-11 DIAGNOSIS — R19.5 POSITIVE COLORECTAL CANCER SCREENING USING COLOGUARD TEST: ICD-10-CM

## 2021-10-11 DIAGNOSIS — Z72.0 TOBACCO ABUSE: ICD-10-CM

## 2021-10-11 DIAGNOSIS — J43.9 PULMONARY EMPHYSEMA, UNSPECIFIED EMPHYSEMA TYPE (H): Primary | ICD-10-CM

## 2021-10-11 DIAGNOSIS — Z78.9 ALCOHOL CONSUMPTION HEAVY: ICD-10-CM

## 2021-10-11 DIAGNOSIS — Z87.891 PERSONAL HISTORY OF TOBACCO USE: ICD-10-CM

## 2021-10-11 DIAGNOSIS — E78.5 HYPERLIPIDEMIA, UNSPECIFIED HYPERLIPIDEMIA TYPE: ICD-10-CM

## 2021-10-11 DIAGNOSIS — I10 ESSENTIAL HYPERTENSION: ICD-10-CM

## 2021-10-11 DIAGNOSIS — R73.9 HYPERGLYCEMIA: ICD-10-CM

## 2021-10-11 LAB
ALBUMIN SERPL-MCNC: 4.1 G/DL (ref 3.4–5)
ALP SERPL-CCNC: 63 U/L (ref 40–150)
ALT SERPL W P-5'-P-CCNC: 41 U/L (ref 0–70)
ANION GAP SERPL CALCULATED.3IONS-SCNC: 8 MMOL/L (ref 3–14)
AST SERPL W P-5'-P-CCNC: 33 U/L (ref 0–45)
BILIRUB SERPL-MCNC: 0.8 MG/DL (ref 0.2–1.3)
BUN SERPL-MCNC: 5 MG/DL (ref 7–30)
CALCIUM SERPL-MCNC: 9.4 MG/DL (ref 8.5–10.1)
CHLORIDE BLD-SCNC: 99 MMOL/L (ref 94–109)
CHOLEST SERPL-MCNC: 177 MG/DL
CO2 SERPL-SCNC: 25 MMOL/L (ref 20–32)
CREAT SERPL-MCNC: 0.53 MG/DL (ref 0.66–1.25)
EST. AVERAGE GLUCOSE BLD GHB EST-MCNC: 108 MG/DL
FASTING STATUS PATIENT QL REPORTED: YES
GFR SERPL CREATININE-BSD FRML MDRD: >90 ML/MIN/1.73M2
GLUCOSE BLD-MCNC: 126 MG/DL (ref 70–99)
HBA1C MFR BLD: 5.4 % (ref 0–5.6)
HDLC SERPL-MCNC: 95 MG/DL
LDLC SERPL CALC-MCNC: 70 MG/DL
NONHDLC SERPL-MCNC: 82 MG/DL
POTASSIUM BLD-SCNC: 3.9 MMOL/L (ref 3.4–5.3)
PROT SERPL-MCNC: 8 G/DL (ref 6.8–8.8)
SODIUM SERPL-SCNC: 132 MMOL/L (ref 133–144)
TRIGL SERPL-MCNC: 58 MG/DL

## 2021-10-11 PROCEDURE — 80053 COMPREHEN METABOLIC PANEL: CPT | Mod: ZL

## 2021-10-11 PROCEDURE — 36415 COLL VENOUS BLD VENIPUNCTURE: CPT | Mod: ZL

## 2021-10-11 PROCEDURE — 82465 ASSAY BLD/SERUM CHOLESTEROL: CPT | Mod: ZL

## 2021-10-11 PROCEDURE — 99406 BEHAV CHNG SMOKING 3-10 MIN: CPT | Performed by: NURSE PRACTITIONER

## 2021-10-11 PROCEDURE — G0463 HOSPITAL OUTPT CLINIC VISIT: HCPCS

## 2021-10-11 PROCEDURE — 99214 OFFICE O/P EST MOD 30 MIN: CPT | Performed by: NURSE PRACTITIONER

## 2021-10-11 PROCEDURE — 83036 HEMOGLOBIN GLYCOSYLATED A1C: CPT | Mod: ZL

## 2021-10-11 PROCEDURE — G0296 VISIT TO DETERM LDCT ELIG: HCPCS | Performed by: NURSE PRACTITIONER

## 2021-10-11 PROCEDURE — 90662 IIV NO PRSV INCREASED AG IM: CPT

## 2021-10-11 PROCEDURE — G0463 HOSPITAL OUTPT CLINIC VISIT: HCPCS | Mod: 25

## 2021-10-11 PROCEDURE — G0008 ADMIN INFLUENZA VIRUS VAC: HCPCS

## 2021-10-11 ASSESSMENT — PATIENT HEALTH QUESTIONNAIRE - PHQ9
9. THOUGHTS THAT YOU WOULD BE BETTER OFF DEAD, OR OF HURTING YOURSELF: NOT AT ALL
6. FEELING BAD ABOUT YOURSELF - OR THAT YOU ARE A FAILURE OR HAVE LET YOURSELF OR YOUR FAMILY DOWN: SEVERAL DAYS
SUM OF ALL RESPONSES TO PHQ QUESTIONS 1-9: 9
SUM OF ALL RESPONSES TO PHQ QUESTIONS 1-9: 9
7. TROUBLE CONCENTRATING ON THINGS, SUCH AS READING THE NEWSPAPER OR WATCHING TELEVISION: NOT AT ALL
5. POOR APPETITE OR OVEREATING: NOT AT ALL
IN THE PAST YEAR HAVE YOU FELT DEPRESSED OR SAD MOST DAYS, EVEN IF YOU FELT OKAY SOMETIMES?: NO
1. LITTLE INTEREST OR PLEASURE IN DOING THINGS: NEARLY EVERY DAY
2. FEELING DOWN, DEPRESSED, IRRITABLE, OR HOPELESS: SEVERAL DAYS
8. MOVING OR SPEAKING SO SLOWLY THAT OTHER PEOPLE COULD HAVE NOTICED. OR THE OPPOSITE, BEING SO FIGETY OR RESTLESS THAT YOU HAVE BEEN MOVING AROUND A LOT MORE THAN USUAL: NOT AT ALL
3. TROUBLE FALLING OR STAYING ASLEEP OR SLEEPING TOO MUCH: SEVERAL DAYS
10. IF YOU CHECKED OFF ANY PROBLEMS, HOW DIFFICULT HAVE THESE PROBLEMS MADE IT FOR YOU TO DO YOUR WORK, TAKE CARE OF THINGS AT HOME, OR GET ALONG WITH OTHER PEOPLE: SOMEWHAT DIFFICULT
4. FEELING TIRED OR HAVING LITTLE ENERGY: NEARLY EVERY DAY

## 2021-10-11 ASSESSMENT — PAIN SCALES - GENERAL: PAINLEVEL: NO PAIN (0)

## 2021-10-11 NOTE — PATIENT INSTRUCTIONS

## 2021-10-11 NOTE — NURSING NOTE
"Chief Complaint   Patient presents with     Hypertension       Initial /78   Pulse 82   Temp 97.6  F (36.4  C)   Resp 18   Wt 73.9 kg (163 lb)   SpO2 95%   BMI 27.98 kg/m   Estimated body mass index is 27.98 kg/m  as calculated from the following:    Height as of 4/7/21: 1.626 m (5' 4\").    Weight as of this encounter: 73.9 kg (163 lb).  Medication Reconciliation: complete  Amrita Hicks    "

## 2021-10-15 ENCOUNTER — TELEPHONE (OUTPATIENT)
Dept: SURGERY | Facility: OTHER | Age: 66
End: 2021-10-15

## 2021-10-15 DIAGNOSIS — Z01.818 PREOP TESTING: Primary | ICD-10-CM

## 2021-10-15 NOTE — LETTER
October 18, 2021          Jaxson Ramirez  [address] [place] [address]   [place] [address]      Dear Jaxson,         We want your Colonoscopy to be as pleasant as possible. Please review the instructions below. If you have questions, you may contact us at the any of the following numbers:   [facility] Health Unit Coordinator: [phone]  Clinic Nurse ([place]): [phone]  Surgery Education Nurse: [phone]    Date of procedure: 11/11/2021 with Dr. Dustin Baires  Admit Time: Hospital Surgery will call you the day before your procedure by 5pm with your arrival time. If your surgery is on Monday, expect a call on Friday.  If you are not contacted before 5PM, please call admitting at [phone].   After hours or on weekends, please call [phone] to postpone.   Call the clinic nurse if you become ill within 2 weeks of your procedure to reschedule.     You will need a preop appointment with your primary care provider within 30 days prior to your procedure. Already scheduled for 11/2/2021 @ 9:05 with Lilly William.    COVID-19 test is needed 4-5 days before procedure. This testing is done at the upper level of [facility] (weekdays and weekends-East Entrance) or at the [facility] (weekday mornings only).  Follow the signage for parking and bring your mobile phone (if you have one) to call the phone number ([phone]) on the sign outside the testing site for check-in. Stay in your vehicle until you are directed to enter. If you do not have a cell phone, please call [phone] just prior to leaving home with a description of the vehicle you are driving.   This has been scheduled for 11/7 at 9:15 at the [place] site.      Please  the following over the counter items for your bowel prep:    Two 5 mg Dulcolax (bisacodyl) tablets   One 8.3 ounce (238 g) bottle of Miralax   One 10 ounce bottle of liquid Magnesium Citrate-not capsules.   One 64 ounce bottle of Gatorade-Not red, purple, or  powdered.   (you will need additional sports drink for the 2 days before colonoscopy)    7 DAYS BEFORE THE EXAM:   11/4  Call the Surgery Education Nurse at [phone] and have a medication list ready.   Stop Aspirin or NSAIDS (Ibuprofen, Celebrex, Naproxen, etc).  Stop fiber supplements, herbals, vitamins, and iron. Stop eating corn, nuts and seeds.  If you are prescribed a daily 81mg Aspirin, you may continue this.  If you are prescribed blood thinners or insulin, talk to your primary provider for instructions.    2 DAYS BEFORE THE EXAM:   11/9  Follow a Low Fiber Diet-see table at end of instructions  Drink at least 4-6 large glasses of sports drink (separate from the bowel prep).   Avoid red and purple.  Do not eat after 11:59 pm.    1 DAY BEFORE THE EXAM:   11/10  No solid food/milk products after 12:00 AM (midnight).   Drink only clear liquids all day, at least 8-10 glasses, including 4-6 glasses of sports drink.   See table at end of instructions  Avoid anything red or purple. No alcohol.            AT 12:00 PM NOON THE DAY BEFORE EXAM:  Take 2 Dulcolax tablets by mouth with clear liquids.            AT 6:00 PM THE DAY BEFORE EXAM:  Mix the bottle of Miralax and 64 oz. of Gatorade in a pitcher.   Drink one 8 oz. glass every 10-15 minutes until gone. Stay near a toilet.     TIPS FOR COLON CLEANSING BEFORE YOUR COLONOSCOPY  To get accurate results from your exam, your colon must be completely empty or you may need to repeat the colon prep and exam. If you followed instructions and your stool is clear or yellow liquid, you are ready. If you are not sure if your colon is clean, please call the clinic nurse.    You may use Tucks wipes, hemorrhoid treatments, hydrocortisone cream or alcohol-free baby wipes to ease anal irritation. You may also use Vaseline to help protect the skin.     Quickly drink each glass. Even when you are sitting on the toilet, keep drinking every 15 minutes. If you have nausea or  vomiting, take a break for 30 minutes and then resume drinking.    You will have loose watery stools and may also have chills. Dress for comfort. Expect to feel bloating, nausea and other discomfort until the stool clears from your colon.      DAY OF COLONOSCOPY:   11/11/2021            6 HOURS PRIOR TO EXAM ON DAY OF PROCEDURE:    Drink the bottle of Magnesium Citrate followed by a full glass of water.   You may have clear liquids up until 2 hours before arrival, then nothing by mouth.  If you need to take any medications after this, take them with a tiny sip of water.   If you have asthma, bring your inhaler with you.  Shower before arrival and wear clean, comfortable clothes.   No jewelry, make-up, nail polish, hair spray, lotions, or perfumes.   [place] in Admitting through the [place] En[facility].   You must have a responsible adult to drive and to stay with you for 4 hours at home.           SURGERY HANDBOOK            Your surgery is scheduled:    Date: 11/11/21  ________________________________    Time: hospital surgery will call the day prior to your procedure with arrival time  ________________________________      Surgeon's Name: TALHA Baires  _______________________        Pre-Op Physical Fax Numbers:          Pre-Admissions  [phone]        Your surgery is located at:  [place] [address]         Before Your Surgery  For Patients and Visitors at [facility] [place]    Welcome  As you get ready for surgery, you may have a lot of questions.  This brochure will help you know what to expect before and after surgery.  You and your family are the most important members of your health care team.  You will need to take an active role in your care.    Be sure to ask questions and learn all that you can about your surgery.  If you have any safety concerns, we urge you to tell a nurse as soon as possible.   This brochure is for information only.  It does not replace the  advice of your doctor.  Always follow your doctor's advice.    Please tell us if you need a .    GETTING READY FOR SURGERY  Always follow your surgeon's instructions.  If you don't, your surgery could be canceled.  Please use the following checklist.  You have been scheduled for surgery and we would like to give you some information that will assist in helping get the best possible outcome.      Before Surgery:   If for any reason you decide not to have the surgery, please contact your surgeon's office.  We can easily cancel or reschedule the procedure. If it is after hours, please call [phone].    Please keep in mind that the time of surgery is subject to change.  Make sure you have nothing to eat after midnight. If your surgery is later in the afternoon, this recommendation might change, but not until the day before surgery after the actual time of the surgery has been established.    If you or your  are deaf or hard of hearing, or prefer a language other than English, please let us know.  We have many free services, including interpreters and other aids to help you communicate. You may ask for help  through any member of your care team or by calling Language Services at [phone], option 2.    Within 30 Days of Surgery:     Have a pre-surgery physical exam with your family doctor or partner.    If you use a [facility], all of your information from the pre-op physical will be in the [employer] computer system.    Ask the doctor to send all of your results to the hospital before the surgery.  The doctor also may ask you to bring the results with you on the day of surgery or you can fax them to [phone].    Tell the doctor if:    You are allergic to latex or rubber  (Latex and rubber gloves are often used in medical care).    You are taking any medicines (including aspirin), vitamins (Vitamin E, Fish Oil, Omegas) or herbal products.  You will need to stop taking some  medicines before surgery.    You have any medical problems (allergies, diabetes or heart disease, for example).    You have a pacemaker or an AICD (automatic implanted cardiac defibrillator).  If you do, please bring the ID card with you on the day of surgery.    You are a smoker.  People who smoke have a higher risk of infection after surgery.  Ask your doctor how you can quit smoking.    Within 7 days of Surgery:  Prior to your surgical procedure, a nurse will be contacting you to obtain a health history. A nurse will call you within a few days of surgery to go over these and other instructions.  If you do not hear from them, please call them at [phone].        Call your insurance company.  Ask if you need pre-approval for your surgery.  If you do not have insurance, please let us know.    Arrange for someone to drive you home after surgery.  If you will have same-day surgery, you may not drive or take public transportation home by yourself.    Arrange for someone to stay with you for 24 hours after you go home.  This person must be a responsible adult (ie- Family member or friend).    The Day Before Surgery:     Call your surgeon if there are any changes in your health.  This includes signs of a cold or flu (such as a sore throat, runny nose, cough, rash or fever).    Do not smoke, drink alcohol or take over-the-counter medicine (unless your surgeon tells you to) for 24 hours before and after surgery.    If you take prescribed drugs:  You may need to stop them until after the surgery.  Follow your doctor's orders.  You may resume Aspirin and/or blood thinners after your surgery as directed by your physician/surgeon.    NO SOLID FOOD. CLEAR LIQUIDS ONLY.  Follow your surgeon's orders for eating and drinking.  You need to have an empty stomach before surgery.  This will make the surgery as safe as possible.  If you don't follow your doctor's orders, your surgery could be changed to another date.    The Day of  Surgery:    Please remove deodorant, cologne, scented lotion, makeup, nail polish and jewelry (including rings and body piercings).  If you wear artificial nails, please remove at least one nail before coming to the hospital.    Wear clean, loose clothing to the hospital.    Bring these items to the hospital:  1. Your insurance card.  2. A list of all the medicines you take.  Include vitamins, minerals, herbs and over-the-counter drugs.  Note any drug allergies.  3. A copy of your advance health care directive, if you have one.  This tells us what treatment you would want -- and who would make health care decisions -- if you could no longer speak for yourself.  You may request this form in advance or download it from [email or web address].  4. A case for any glasses, contact lenses, hearing aids or dentures.  5. Your inhaler or CPAP machine, if you use these at home.  Leave extra cash, jewelry and other valuables at home.    When You Arrive:  When you get to the hospital, you will:    Check in.  If you are under age 18, you must be with a parent or legal guardian.    Electronically sign consent forms, if you haven't already.  These electronic forms state that you know the risks and benefits of surgery.  When you sign the forms, you give us permission to do the surgery.  Do not sign them unless you understand what will happen during and after your surgery.  If you have any questions about your surgery, ask to speak with your doctor before you sign the forms.  If you don't understand the answers, ask again.    Receive a copy of the Patients Bill of Rights.  If you do not receive a copy, please ask for one.    Change into hospital clothes.  Your belongings will be placed in a bag.  We will return them to you after surgery.    Meet with the anesthesia provider.  He or she will tell you what kind of anesthesia (medicine) will be used to keep you comfortable during surgery.  Remember: It's okay to remind doctors and  nurses to wash their hands before touching you.   In most cases, your surgeon will use a marker to write his or her initials on the surgery site.  This ensures that the exact site is operated on.  For safety reasons, we will ask you the same questions many times.  For example, we may ask your name and birth date over and over again.  Friends and family can stay with you until it's time for surgery.  While you're in surgery, they will be in the waiting area.  Please note that cell phones are not allowed in some patient care areas.  If you have questions about what will happen in the operating room, talk to your care team.    After Surgery:    We will move you to a recovery room where we will watch you closely.  If you have any pain or discomfort, tell your nurse.  He or she will try to make you comfortable.      If you are staying overnight we will move you to your hospital room after you are awake.    If you are going home we will move you to another room.  Friends and family may be able to join you.  The length of time you spend in recovery depends on the type of medicine you received, your medical condition, and the type of surgery you had.    Going Home:  We will let you know when you're ready to leave the hospital.  Before you leave, we will tell you how to care for yourself at home.  If you do not understand something, please say so.  We will answer any questions you have.  We will then help you get ready to leave.  When you are discharged from the recovery room, the nurses will review instructions with you and your caregiver.  You must have an adult with you for the first 4 hours after you leave the hospital. Take it easy when you get home.  You will need some time to recover -- you may be more tired than you realize at first.  Rest and relax for rest of the day at home.  You'll feel better and heal faster if you take good care of yourself.  Symptoms of infection need to be reported to your surgery office.  Please call your Surgeon.      Thank you for following these important instructions.                    Low Fiber Diet    You may have Do NOT have   Starches:        White breads (tortillas, biscuits, toast, pancakes, waffles, etc.), white rice, pasta (not whole grain), cooked and peeled potatoes, plain or saltine crackers, cooked farina or cream of rice, puffed rice, corn flakes, Rice Krispies, Special K.   Starches:       Whole grain breads; Breads containing nuts, seeds or fruit, or more than 1 gram fiber per slice, cornbread, corn or whole wheat tortillas, potatoes with skin, brown rice, wild rice, kasha/buckwheat.   Vegetables:       Tender, well cooked and canned vegetables without seeds or peels including carrots, asparagus tips, green beans, wax  beans, spinach; vegetable broth Vegetables:       Any raw or steamed vegetables, vegetables with seeds, corn in any form   Fruits/Juices:        Strained fruit juice, canned fruit without seeds or skin (not pineapple,) applesauce, pear, ripe bananas, melons (not watermelon) Fruit/Juices:        Prunes, prune juice, raisins or other dried fruits, berries and other fruits with seeds, pineapple, fresh or frozen fruits not listed in other column   Milk Products:       Milk, cheese, cottage cheese, yogurt without berries, custard, ice cream without nuts/fruit Milk Products:       Any dairy product with nuts, seeds or berries   Proteins:       Tender, well-cooked ground beef, lamb, veal, ham, pork, chicken, turkey, fish or organ meats; eggs, creamy peanut butter Proteins:        Tough, fibrous meats with gristle, cooked dried beans, peas or lentils, crunchy peanut butter          Fats and condiments:        Margarine, butter, oils, hutchison, sour cream, salad dressing, plain gravy, spices, cooked herbs, sugar, clear jelly, honey, syrup Fats and condiments:        Pickles, olives, relish, horseradish, jam, marmalade, preserves   Snacks, sweets and drinks:       Pretzels, hard  candy, plain cakes and cookies without nuts or seeds, gelatin, plain pudding, sherbet, popsicles, coffee, tea, carbonated beverages Snacks, sweets and drinks:       Popcorn, nuts, seeds, granola, coconut, candies or desserts with nuts/seeds and raisins/dried fruits or whole grains.       Clear Liquid Diet  You may have: Do NOT have:     ? Tea, coffee (no cream)   Milk or milk products such as ice cream, malts or shakes     ? Water, Vitamin Water, Smart Water, Coconut Water, PowerAde, Propel, Soda pop, (Sprite, 7 UP, Ginger Ale, Gatorade (not red or purple)   Red or purple drinks of any kind such as  Cranberry juice or grape juice.     ? Clear nutrition drinks (Resource Breeze, Ensure Active protein drink (peach flavor)   Red or purple Jell-O, Popsicles, Pasha-Aid, Sorbet and candy.     ? Jell-O, Popsicles (no milk or fruit pieces) or Italian ice (not red or purple)   Juices with pulp such as orange, grapefruit, pineapple or tomato juice           Honey/Sugar   Cream soups of any kind     ? Fat-free soup broth or bouillon   Alcohol     ? Plain hard candy, such as clear Life Savers (not red or purple)      ? Powdered Lemonade such as Crystal Light, Country Time      ? Clear juices and fruit-flavored drinks such as apple juice, white grape juice, Hi-C and Pasha-Aid (not red or purple)                       Sincerely,        Dr. Dustin Baires/Sophia SEVILLA LPN

## 2021-10-18 ENCOUNTER — PREP FOR PROCEDURE (OUTPATIENT)
Dept: SURGERY | Facility: OTHER | Age: 66
End: 2021-10-18

## 2021-10-18 DIAGNOSIS — R19.5 POSITIVE COLORECTAL CANCER SCREENING USING COLOGUARD TEST: Primary | ICD-10-CM

## 2021-10-18 NOTE — TELEPHONE ENCOUNTER
Referral received for colonoscopy for positive cologuard.   This patient was not approved by surgery education RN for meet and greet colonoscopy without preop appointment.   Patient scheduled for colonoscopy on 11/11/2021 with Dr. Dustin Baires at [facility] with Gatorade bowel prep.   Instructions given via phone and instructions mailed to patient with surgery handbook.   COVID-19 test: 11/7  Preop: 11/2 [facility]

## 2021-10-19 ENCOUNTER — HOSPITAL ENCOUNTER (OUTPATIENT)
Dept: CT IMAGING | Facility: HOSPITAL | Age: 66
Discharge: HOME | End: 2021-10-19
Attending: NURSE PRACTITIONER | Admitting: NURSE PRACTITIONER
Payer: COMMERCIAL

## 2021-10-19 DIAGNOSIS — Z87.891 PERSONAL HISTORY OF TOBACCO USE: ICD-10-CM

## 2021-10-19 DIAGNOSIS — Z72.0 TOBACCO ABUSE: ICD-10-CM

## 2021-10-19 PROCEDURE — 71271 CT THORAX LUNG CANCER SCR C-: CPT

## 2021-10-28 NOTE — PATIENT INSTRUCTIONS

## 2021-10-28 NOTE — PROGRESS NOTES
[facility] [place]  [address] [place] [place] [address]  Phone: [phone]  Primary Provider: Annette William  Pre-op Performing Provider: IFRAH QUEEN      PREOPERATIVE EVALUATION:  Today's date: 11/3/2021    Jaxson Ramirez is a 66 year old male who presents for a preoperative evaluation.    Surgical Information:  Surgery/Procedure: Colonoscopy  Surgery Location: [facility] [place]  Surgeon: Dequan  Surgery Date: 11/11/2021  Time of Surgery: 2:45pm  Where patient plans to recover: At home with family  Fax number for surgical facility: Note does not need to be faxed, will be available electronically in Epic.    Type of Anesthesia Anticipated: Monitor Anesthesia Care    Assessment & Plan     The proposed surgical procedure is considered INTERMEDIATE risk.    Preop general physical exam  Positive colorectal cancer screening using Cologuard test  Cleared for procedure - monitor respiratory status closely   - Basic metabolic panel; Future  - CBC with platelets and differential; Future  - EKG 12-lead complete w/read - (Clinic Performed)     noted low sodium level on labs - will encourage decrease alcohol intake and follow up with PCP Annette William NP in about 3 weeks.     Risks and Recommendations:  The patient has the following additional risks and recommendations for perioperative complications:  Pulmonary:    - Incentive spirometry post-op   - Active nicotine user, advised smoking cessation  Discussed with PCP chronic wheezing and continues to smoke.  Encouraged smoking cessation     Medication Instructions:  Patient is to take all scheduled medications on the day of surgery EXCEPT for modifications listed below:   - aspirin: Discontinue aspirin 7-10 days prior to procedure to reduce bleeding risk. It should be resumed postoperatively.    - ACE/ARB: HOLD due to exceptional risk of hypotension during surgery.    - Beta Blockers: Continue taking on the day of surgery.   - Calcium Channel Blockers: May be  continued on the day of surgery.   - Statins: Continue taking on the day of surgery.    - LABA, inhaled corticosteroid, long-acting anticholinergics: Continue without modification.   - rescue Inhaler: Continue PRN. Bring to hospital on the day of surgery.    RECOMMENDATION:  APPROVAL GIVEN to proceed with proposed procedure, with Anesthesia to monitor his respiratory status .    He has chronic wheezing with his COPD and smoking.  Monitor respiratory status closely during procedure       I spent a total of 30 minutes on the day of the visit.   Time spent doing chart review, history and exam, documentation and further activities per the note        Subjective     HPI related to upcoming procedure: positive cologuard       Preop Questions 11/3/2021   1. Have you ever had a heart attack or stroke? No   2. Have you ever had surgery on your heart or blood vessels, such as a stent placement, a coronary artery bypass, or surgery on an artery in your head, neck, heart, or legs? No   3. Do you have chest pain with activity? No   4. Do you have a history of  heart failure? No   5. Do you currently have a cold, bronchitis or symptoms of other infection? No   6. Do you have a cough, shortness of breath, or wheezing? Yes, smokers cough    7. Do you or anyone in your family have previous history of blood clots? No   8. Do you or does anyone in your family have a serious bleeding problem such as prolonged bleeding following surgeries or cuts? No   9. Have you ever had problems with anemia or been told to take iron pills? No   10. Have you had any abnormal blood loss such as black, tarry or bloody stools? No   11. Have you ever had a blood transfusion? No   12. Are you willing to have a blood transfusion if it is medically needed before, during, or after your surgery? Yes   13. Have you or any of your relatives ever had problems with anesthesia? No   14. Do you have sleep apnea, excessive snoring or daytime drowsiness? No   15. Do  you have any artifical heart valves or other implanted medical devices like a pacemaker, defibrillator, or continuous glucose monitor? No   16. Do you have artificial joints? No   17. Are you allergic to latex? No     Health Care Directive:  Patient does not have a Health Care Directive or Living Will: Discussed advance care planning with patient; however, patient declined at this time.    Preoperative Review of :   reviewed - no record of controlled substances prescribed.      Status of Chronic Conditions:  See problem list for active medical problems.  Problems all longstanding and stable, except as noted/documented.  See ROS for pertinent symptoms related to these conditions.    COPD - Patient has a longstanding history of moderate-severe COPD . Patient has been doing well overall noting SOB and continues on medication regimen consisting of Breo and albuterol inhaler  without adverse reactions or side effects.    HYPERLIPIDEMIA - Patient has a long history of significant Hyperlipidemia requiring medication for treatment with recent good control. Patient reports no problems or side effects with the medication.     HYPERTENSION - Patient has longstanding history of HTN , currently denies any symptoms referable to elevated blood pressure. Specifically denies chest pain, palpitations, dyspnea, orthopnea, PND or peripheral edema. Blood pressure readings have been in normal range. Current medication regimen is as listed below. Patient denies any side effects of medication.       Review of Systems  CONSTITUTIONAL: NEGATIVE for fever, chills, change in weight  INTEGUMENTARY/SKIN: NEGATIVE for worrisome rashes, moles or lesions  EYES: NEGATIVE for vision changes or irritation  ENT/MOUTH: NEGATIVE for ear, mouth and throat problems  RESP:Hx COPD and smoking  CV: NEGATIVE for chest pain, palpitations or peripheral edema  GI: NEGATIVE for nausea, abdominal pain, heartburn, or change in bowel habits  : NEGATIVE for  frequency, dysuria, or hematuria  MUSCULOSKELETAL: NEGATIVE for significant arthralgias or myalgia  NEURO: NEGATIVE for weakness, dizziness or paresthesias  ENDOCRINE: NEGATIVE for temperature intolerance, skin/hair changes  HEME: NEGATIVE for bleeding problems  PSYCHIATRIC: HX anxiety and HX depression    Patient Active Problem List    Diagnosis Date Noted     Colon cancer screening 09/25/2020     Priority: Medium     Added automatically from request for surgery [id number]       Positive colorectal cancer screening using Cologuard test 09/25/2020     Priority: Medium     Added automatically from request for surgery [id number]       Essential hypertension 10/21/2019     Priority: Medium     Pulmonary emphysema, unspecified emphysema type (H) 10/21/2019     Priority: Medium     Tobacco abuse 10/21/2019     Priority: Medium     Hyperlipidemia, unspecified hyperlipidemia type 10/21/2019     Priority: Medium     Overweight (BMI 25.0-29.9) 04/10/2019     Priority: Medium     BPH associated with nocturia 10/09/2018     Priority: Medium     Alcohol consumption heavy 09/30/2015     Priority: Medium     Elevated fasting glucose 09/25/2013     Priority: Medium      Past Medical History:   Diagnosis Date     Cellulitis and abscess of oral soft tissues      COPD (chronic obstructive pulmonary disease) (H)      Hypertension      Past Surgical History:   Procedure Laterality Date     cataract right       layngoscopy  2009    layngoscopy     UVULECTOMY       Current Outpatient Medications   Medication Sig Dispense Refill     albuterol (PROAIR HFA/PROVENTIL HFA/VENTOLIN HFA) 108 (90 Base) MCG/ACT inhaler INHALE 2 PUFFS BY MOUTH EVERY 4 HOURS AS NEEDED FOR WHEEZING AND FOR SHORTNESS OF BREATH SHAKE BEFORE USING. 18 g 3     amLODIPine (NORVASC) 10 MG tablet Take 1 tablet (10 mg) by mouth daily 90 tablet 3     ASPIRIN PO Take 81 mg by mouth daily       BREO ELLIPTA 100-25 MCG/INH inhaler Inhale 1 puff by mouth once daily 60 each 3      ketoconazole (NIZORAL) 2 % external cream Apply bid to left groin area 30 g 3     losartan (COZAAR) 100 MG tablet Take 1 tablet by mouth once daily 90 tablet 0     metoprolol succinate ER (TOPROL-XL) 200 MG 24 hr tablet Take 1 tablet (200 mg) by mouth daily 90 tablet 3     rosuvastatin (CRESTOR) 20 MG tablet Take 1 tablet (20 mg) by mouth daily 90 tablet 3     triamcinolone (KENALOG) 0.1 % external ointment Apply topically 2 times daily 80 g 1       Allergies   Allergen Reactions     Ace Inhibitors      Tramadol Hcl Diarrhea     Vomiting  Ultram          Social History     Tobacco Use     Smoking status: Current Every Day Smoker     Packs/day: 1.00     Smokeless tobacco: Never Used     Tobacco comment: pt denied QP 12/4/19   Substance Use Topics     Alcohol use: Yes     Comment: 6  pack per day     Family History   Problem Relation Age of Onset     Hypertension Brother      Cancer Brother         renal     Liver Cancer Mother      Parkinsonism Father      Bladder Cancer Father         smoker     History   Drug Use No         Objective     BP (!) 140/78 (BP Location: Right arm, Patient Position: Chair, Cuff Size: Adult Regular)   Pulse 87   Temp 98.6  F (37  C) (Tympanic)   Resp 18   Wt 72.6 kg (160 lb)   SpO2 96%   BMI 27.46 kg/m      Physical Exam    GENERAL APPEARANCE: alert, active and no distress     EYES: EOMI,  PERRL     HENT: ear canals and TM's normal and nose and mouth without ulcers or lesions     NECK: no adenopathy, no asymmetry, masses, or scars and thyroid normal to palpation     RESP: intermittent wheezing      CV: regular rates and rhythm, normal S1 S2, no S3 or S4 and no murmur, click or rub     ABDOMEN:  soft, nontender, no HSM or masses and bowel sounds normal     MS: extremities normal- no gross deformities noted, no evidence of inflammation in joints, FROM in all extremities.     SKIN: no suspicious lesions or rashes     NEURO: Normal strength and tone, sensory exam grossly normal,  mentation intact and speech normal     PSYCH: mentation appears normal. and affect normal/bright     LYMPHATICS: No cervical adenopathy    Recent Labs   Lab Test 10/11/21  0806 04/07/21  0837   * 134   POTASSIUM 3.9 4.2   CR 0.53* 0.58*   A1C 5.4 5.3        Diagnostics:  Recent Results (from the past 24 hour(s))   Extra Green Top (Lithium Heparin) Tube    Collection Time: 11/03/21 10:17 AM   Result Value Ref Range    Hold Specimen JIC    Extra Purple Top Tube    Collection Time: 11/03/21 10:17 AM   Result Value Ref Range    Hold Specimen JIC    Extra Urine Collection    Collection Time: 11/03/21 10:25 AM   Result Value Ref Range    Hold Specimen JIC    Basic metabolic panel    Collection Time: 11/03/21 10:52 AM   Result Value Ref Range    Sodium 129 (L) 133 - 144 mmol/L    Potassium 3.7 3.4 - 5.3 mmol/L    Chloride 98 94 - 109 mmol/L    Carbon Dioxide (CO2) 24 20 - 32 mmol/L    Anion Gap 7 3 - 14 mmol/L    Urea Nitrogen 10 7 - 30 mg/dL    Creatinine 0.55 (L) 0.66 - 1.25 mg/dL    Calcium 9.2 8.5 - 10.1 mg/dL    Glucose 138 (H) 70 - 99 mg/dL    GFR Estimate >90 >60 mL/min/1.73m2   CBC with platelets and differential    Collection Time: 11/03/21 10:52 AM   Result Value Ref Range    WBC Count 7.0 4.0 - 11.0 10e3/uL    RBC Count 4.82 4.40 - 5.90 10e6/uL    Hemoglobin 16.8 13.3 - 17.7 g/dL    Hematocrit 46.7 40.0 - 53.0 %    MCV 97 78 - 100 fL    MCH 34.9 (H) 26.5 - 33.0 pg    MCHC 36.0 31.5 - 36.5 g/dL    RDW 12.8 10.0 - 15.0 %    Platelet Count 163 150 - 450 10e3/uL    % Neutrophils 68 %    % Lymphocytes 19 %    % Monocytes 11 %    % Eosinophils 1 %    % Basophils 1 %    % Immature Granulocytes 0 %    NRBCs per 100 WBC 0 <1 /100    Absolute Neutrophils 4.7 1.6 - 8.3 10e3/uL    Absolute Lymphocytes 1.3 0.8 - 5.3 10e3/uL    Absolute Monocytes 0.8 0.0 - 1.3 10e3/uL    Absolute Eosinophils 0.1 0.0 - 0.7 10e3/uL    Absolute Basophils 0.0 0.0 - 0.2 10e3/uL    Absolute Immature Granulocytes 0.0 <=0.0 10e3/uL    Absolute  NRBCs 0.0 10e3/uL      EKG: appears normal, NSR, normal axis, normal intervals, no acute ST/T changes c/w ischemia, no LVH by voltage criteria    Revised Cardiac Risk Index (RCRI):  The patient has the following serious cardiovascular risks for perioperative complications:   - No serious cardiac risks = 0 points     RCRI Interpretation: 1 point: Class II (low risk - 0.9% complication rate)           Signed Electronically by: JAYA Fish CNP  Copy of this evaluation report is provided to requesting physician.

## 2021-11-03 ENCOUNTER — OFFICE VISIT (OUTPATIENT)
Dept: FAMILY MEDICINE | Facility: OTHER | Age: 66
End: 2021-11-03
Attending: NURSE PRACTITIONER
Payer: COMMERCIAL

## 2021-11-03 ENCOUNTER — ANESTHESIA EVENT (OUTPATIENT)
Dept: SURGERY | Facility: HOSPITAL | Age: 66
End: 2021-11-03
Payer: COMMERCIAL

## 2021-11-03 VITALS
SYSTOLIC BLOOD PRESSURE: 122 MMHG | HEART RATE: 87 BPM | OXYGEN SATURATION: 96 % | BODY MASS INDEX: 27.46 KG/M2 | DIASTOLIC BLOOD PRESSURE: 70 MMHG | WEIGHT: 160 LBS | RESPIRATION RATE: 18 BRPM | TEMPERATURE: 98.6 F

## 2021-11-03 DIAGNOSIS — E87.1 HYPONATREMIA: ICD-10-CM

## 2021-11-03 DIAGNOSIS — I10 ESSENTIAL HYPERTENSION: ICD-10-CM

## 2021-11-03 DIAGNOSIS — R19.5 POSITIVE COLORECTAL CANCER SCREENING USING COLOGUARD TEST: ICD-10-CM

## 2021-11-03 DIAGNOSIS — Z01.818 PREOP GENERAL PHYSICAL EXAM: Primary | ICD-10-CM

## 2021-11-03 LAB
ANION GAP SERPL CALCULATED.3IONS-SCNC: 7 MMOL/L (ref 3–14)
BASOPHILS # BLD AUTO: 0 10E3/UL (ref 0–0.2)
BASOPHILS NFR BLD AUTO: 1 %
BUN SERPL-MCNC: 10 MG/DL (ref 7–30)
CALCIUM SERPL-MCNC: 9.2 MG/DL (ref 8.5–10.1)
CHLORIDE BLD-SCNC: 98 MMOL/L (ref 94–109)
CO2 SERPL-SCNC: 24 MMOL/L (ref 20–32)
CREAT SERPL-MCNC: 0.55 MG/DL (ref 0.66–1.25)
EOSINOPHIL # BLD AUTO: 0.1 10E3/UL (ref 0–0.7)
EOSINOPHIL NFR BLD AUTO: 1 %
ERYTHROCYTE [DISTWIDTH] IN BLOOD BY AUTOMATED COUNT: 12.8 % (ref 10–15)
GFR SERPL CREATININE-BSD FRML MDRD: >90 ML/MIN/1.73M2
GLUCOSE BLD-MCNC: 138 MG/DL (ref 70–99)
HCT VFR BLD AUTO: 46.7 % (ref 40–53)
HGB BLD-MCNC: 16.8 G/DL (ref 13.3–17.7)
HOLD SPECIMEN: NORMAL
IMM GRANULOCYTES # BLD: 0 10E3/UL
IMM GRANULOCYTES NFR BLD: 0 %
LYMPHOCYTES # BLD AUTO: 1.3 10E3/UL (ref 0.8–5.3)
LYMPHOCYTES NFR BLD AUTO: 19 %
MCH RBC QN AUTO: 34.9 PG (ref 26.5–33)
MCHC RBC AUTO-ENTMCNC: 36 G/DL (ref 31.5–36.5)
MCV RBC AUTO: 97 FL (ref 78–100)
MONOCYTES # BLD AUTO: 0.8 10E3/UL (ref 0–1.3)
MONOCYTES NFR BLD AUTO: 11 %
NEUTROPHILS # BLD AUTO: 4.7 10E3/UL (ref 1.6–8.3)
NEUTROPHILS NFR BLD AUTO: 68 %
NRBC # BLD AUTO: 0 10E3/UL
NRBC BLD AUTO-RTO: 0 /100
PLATELET # BLD AUTO: 163 10E3/UL (ref 150–450)
POTASSIUM BLD-SCNC: 3.7 MMOL/L (ref 3.4–5.3)
RBC # BLD AUTO: 4.82 10E6/UL (ref 4.4–5.9)
SODIUM SERPL-SCNC: 129 MMOL/L (ref 133–144)
WBC # BLD AUTO: 7 10E3/UL (ref 4–11)

## 2021-11-03 PROCEDURE — 80048 BASIC METABOLIC PNL TOTAL CA: CPT | Mod: ZL | Performed by: NURSE PRACTITIONER

## 2021-11-03 PROCEDURE — 99214 OFFICE O/P EST MOD 30 MIN: CPT | Performed by: NURSE PRACTITIONER

## 2021-11-03 PROCEDURE — 36415 COLL VENOUS BLD VENIPUNCTURE: CPT | Mod: ZL | Performed by: NURSE PRACTITIONER

## 2021-11-03 PROCEDURE — 85025 COMPLETE CBC W/AUTO DIFF WBC: CPT | Mod: ZL | Performed by: NURSE PRACTITIONER

## 2021-11-03 PROCEDURE — G0463 HOSPITAL OUTPT CLINIC VISIT: HCPCS | Mod: 25

## 2021-11-03 PROCEDURE — 93010 ELECTROCARDIOGRAM REPORT: CPT | Performed by: INTERNAL MEDICINE

## 2021-11-03 PROCEDURE — 93005 ELECTROCARDIOGRAM TRACING: CPT

## 2021-11-03 RX ORDER — LOSARTAN POTASSIUM 100 MG/1
TABLET ORAL
Qty: 90 TABLET | Refills: 0 | Status: SHIPPED | OUTPATIENT
Start: 2021-11-03 | End: 2022-02-02

## 2021-11-03 ASSESSMENT — PAIN SCALES - GENERAL: PAINLEVEL: NO PAIN (0)

## 2021-11-03 ASSESSMENT — COPD QUESTIONNAIRES: COPD: 1

## 2021-11-03 ASSESSMENT — LIFESTYLE VARIABLES: TOBACCO_USE: 1

## 2021-11-03 NOTE — TELEPHONE ENCOUNTER
Losartan  Last Written Prescription Date: 8/2/21  Last Fill Quantity: 90 # of Refills: 0  Last Office Visit: 11/3/21

## 2021-11-03 NOTE — ANESTHESIA PREPROCEDURE EVALUATION
Anesthesia Pre-Procedure Evaluation    Patient: Jaxson Ramirez   MRN: [id number] : 1955        Preoperative Diagnosis: Positive colorectal cancer screening using Cologuard test [R19.5]    Procedure : Procedure(s):  colonoscopy            Past Medical History:   Diagnosis Date     Cellulitis and abscess of oral soft tissues      COPD (chronic obstructive pulmonary disease) (H)      Hypertension       Past Surgical History:   Procedure Laterality Date     cataract right       layngoscopy  2009    layngoscopy     UVULECTOMY        Allergies   Allergen Reactions     Ace Inhibitors      Tramadol Hcl Diarrhea     Vomiting  Ultram        Social History     Tobacco Use     Smoking status: Current Every Day Smoker     Packs/day: 1.00     Smokeless tobacco: Never Used     Tobacco comment: pt denied QP 19   Substance Use Topics     Alcohol use: Yes     Comment: 6  pack per day      Wt Readings from Last 1 Encounters:   21 72.6 kg (160 lb)        Anesthesia Evaluation   Pt has had prior anesthetic. Type: General.    No history of anesthetic complications       ROS/MED HX  ENT/Pulmonary: Comment: emphysema    (+) tobacco use, Current use, mild,  COPD,     Neurologic:       Cardiovascular:     (+) Dyslipidemia hypertension-----    METS/Exercise Tolerance:     Hematologic:    (-) history of blood clots   Musculoskeletal:       GI/Hepatic:     (+) bowel prep,     Renal/Genitourinary:     (+) BPH,     Endo:       Psychiatric/Substance Use:     (+) alcohol abuse     Infectious Disease:       Malignancy:       Other:            Physical Exam    Airway  airway exam normal      Mallampati: II   TM distance: < 3 FB   Neck ROM: full   Mouth opening: < 3 cm    Respiratory Devices and Support         Dental  no notable dental history         Cardiovascular   cardiovascular exam normal       Rhythm and rate: regular and normal     Pulmonary   pulmonary exam normal        breath sounds clear to auscultation            OUTSIDE LABS:  CBC: No results found for: WBC, HGB, HCT, PLT  BMP:   Lab Results   Component Value Date     (L) 10/11/2021     04/07/2021    POTASSIUM 3.9 10/11/2021    POTASSIUM 4.2 04/07/2021    CHLORIDE 99 10/11/2021    CHLORIDE 100 04/07/2021    CO2 25 10/11/2021    CO2 28 04/07/2021    BUN 5 (L) 10/11/2021    BUN 7 04/07/2021    CR 0.53 (L) 10/11/2021    CR 0.58 (L) 04/07/2021     (H) 10/11/2021     (H) 04/07/2021     COAGS: No results found for: PTT, INR, FIBR  POC: No results found for: BGM, HCG, HCGS  HEPATIC:   Lab Results   Component Value Date    ALBUMIN 4.1 10/11/2021    PROTTOTAL 8.0 10/11/2021    ALT 41 10/11/2021    AST 33 10/11/2021    ALKPHOS 63 10/11/2021    BILITOTAL 0.8 10/11/2021     OTHER:   Lab Results   Component Value Date    A1C 5.4 10/11/2021    EDDIE 9.4 10/11/2021       Anesthesia Plan    ASA Status:  2   NPO Status:  NPO Appropriate    Anesthesia Type: MAC.     - Reason for MAC: straight local not clinically adequate   Induction: Intravenous, Propofol.   Maintenance: Balanced.        Consents    Anesthesia Plan(s) and associated risks, benefits, and realistic alternatives discussed. Questions answered and patient/representative(s) expressed understanding.     - Discussed with:  Patient      - Extended Intubation/Ventilatory Support Discussed: No.    Use of blood products discussed: No .     Postoperative Care       PONV prophylaxis: Background Propofol Infusion     Comments:                Ro Sauceda, JAYA CRNA

## 2021-11-03 NOTE — NURSING NOTE
"Chief Complaint   Patient presents with     Pre-Op Exam       Initial BP (!) 140/78 (BP Location: Right arm, Patient Position: Chair, Cuff Size: Adult Regular)   Pulse 87   Temp 98.6  F (37  C) (Tympanic)   Resp 18   Wt 72.6 kg (160 lb)   SpO2 96%   BMI 27.46 kg/m   Estimated body mass index is 27.46 kg/m  as calculated from the following:    Height as of 4/7/21: 1.626 m (5' 4\").    Weight as of this encounter: 72.6 kg (160 lb).  Medication Reconciliation: complete  Nona Royal LPN  "

## 2021-11-07 ENCOUNTER — OFFICE VISIT (OUTPATIENT)
Dept: FAMILY MEDICINE | Facility: OTHER | Age: 66
End: 2021-11-07
Attending: SURGERY
Payer: COMMERCIAL

## 2021-11-07 DIAGNOSIS — Z01.818 PREOP TESTING: ICD-10-CM

## 2021-11-07 PROCEDURE — U0005 INFEC AGEN DETEC AMPLI PROBE: HCPCS | Mod: ZL

## 2021-11-08 LAB — SARS-COV-2 RNA RESP QL NAA+PROBE: NEGATIVE

## 2021-11-11 ENCOUNTER — ANESTHESIA (OUTPATIENT)
Dept: SURGERY | Facility: HOSPITAL | Age: 66
End: 2021-11-11
Payer: COMMERCIAL

## 2021-11-11 ENCOUNTER — HOSPITAL ENCOUNTER (OUTPATIENT)
Facility: HOSPITAL | Age: 66
Discharge: HOME | End: 2021-11-11
Attending: SURGERY | Admitting: SURGERY
Payer: COMMERCIAL

## 2021-11-11 VITALS
DIASTOLIC BLOOD PRESSURE: 89 MMHG | HEIGHT: 67 IN | BODY MASS INDEX: 24.92 KG/M2 | TEMPERATURE: 98.1 F | HEART RATE: 71 BPM | OXYGEN SATURATION: 99 % | RESPIRATION RATE: 16 BRPM | WEIGHT: 158.8 LBS | SYSTOLIC BLOOD PRESSURE: 139 MMHG

## 2021-11-11 DIAGNOSIS — R19.5 POSITIVE COLORECTAL CANCER SCREENING USING COLOGUARD TEST: ICD-10-CM

## 2021-11-11 PROCEDURE — 250N000011 HC RX IP 250 OP 636: Performed by: NURSE ANESTHETIST, CERTIFIED REGISTERED

## 2021-11-11 PROCEDURE — 45378 DIAGNOSTIC COLONOSCOPY: CPT | Performed by: NURSE ANESTHETIST, CERTIFIED REGISTERED

## 2021-11-11 PROCEDURE — 999N000141 HC STATISTIC PRE-PROCEDURE NURSING ASSESSMENT: Performed by: SURGERY

## 2021-11-11 PROCEDURE — 710N000012 HC RECOVERY PHASE 2, PER MINUTE: Performed by: SURGERY

## 2021-11-11 PROCEDURE — 45378 DIAGNOSTIC COLONOSCOPY: CPT | Performed by: SURGERY

## 2021-11-11 PROCEDURE — 258N000003 HC RX IP 258 OP 636: Performed by: NURSE ANESTHETIST, CERTIFIED REGISTERED

## 2021-11-11 PROCEDURE — 360N000075 HC SURGERY LEVEL 2, PER MIN: Performed by: SURGERY

## 2021-11-11 PROCEDURE — 370N000017 HC ANESTHESIA TECHNICAL FEE, PER MIN: Performed by: SURGERY

## 2021-11-11 RX ORDER — ONDANSETRON 2 MG/ML
4 INJECTION INTRAMUSCULAR; INTRAVENOUS EVERY 30 MIN PRN
Status: DISCONTINUED | OUTPATIENT
Start: 2021-11-11 | End: 2021-11-11 | Stop reason: HOSPADM

## 2021-11-11 RX ORDER — ONDANSETRON 4 MG/1
4 TABLET, ORALLY DISINTEGRATING ORAL EVERY 30 MIN PRN
Status: DISCONTINUED | OUTPATIENT
Start: 2021-11-11 | End: 2021-11-11 | Stop reason: HOSPADM

## 2021-11-11 RX ORDER — ALBUTEROL SULFATE 0.83 MG/ML
2.5 SOLUTION RESPIRATORY (INHALATION) EVERY 4 HOURS PRN
Status: DISCONTINUED | OUTPATIENT
Start: 2021-11-11 | End: 2021-11-11 | Stop reason: HOSPADM

## 2021-11-11 RX ORDER — PROPOFOL 10 MG/ML
INJECTION, EMULSION INTRAVENOUS PRN
Status: DISCONTINUED | OUTPATIENT
Start: 2021-11-11 | End: 2021-11-11

## 2021-11-11 RX ORDER — LABETALOL 20 MG/4 ML (5 MG/ML) INTRAVENOUS SYRINGE
10
Status: DISCONTINUED | OUTPATIENT
Start: 2021-11-11 | End: 2021-11-11 | Stop reason: HOSPADM

## 2021-11-11 RX ORDER — SODIUM CHLORIDE, SODIUM LACTATE, POTASSIUM CHLORIDE, CALCIUM CHLORIDE 600; 310; 30; 20 MG/100ML; MG/100ML; MG/100ML; MG/100ML
INJECTION, SOLUTION INTRAVENOUS CONTINUOUS
Status: DISCONTINUED | OUTPATIENT
Start: 2021-11-11 | End: 2021-11-11 | Stop reason: HOSPADM

## 2021-11-11 RX ORDER — LIDOCAINE 40 MG/G
CREAM TOPICAL
Status: DISCONTINUED | OUTPATIENT
Start: 2021-11-11 | End: 2021-11-11 | Stop reason: HOSPADM

## 2021-11-11 RX ADMIN — SODIUM CHLORIDE, POTASSIUM CHLORIDE, SODIUM LACTATE AND CALCIUM CHLORIDE: 600; 310; 30; 20 INJECTION, SOLUTION INTRAVENOUS at 13:46

## 2021-11-11 RX ADMIN — PROPOFOL 125 MCG/KG/MIN: 10 INJECTION, EMULSION INTRAVENOUS at 14:17

## 2021-11-11 RX ADMIN — PROPOFOL 100 MG: 10 INJECTION, EMULSION INTRAVENOUS at 14:15

## 2021-11-11 ASSESSMENT — COPD QUESTIONNAIRES: CAT_SEVERITY: MILD

## 2021-11-11 ASSESSMENT — MIFFLIN-ST. JEOR: SCORE: 1453.94

## 2021-11-11 NOTE — OP NOTE
REPORT OF OPERATION  DATE OF PROCEDURE: 11/11/2021    PATIENT: Jaxson Ramirez    SURGERY PERFORMED: Colonoscopy    PREOPERATIVE DIAGNOSIS: Positive ColoGuard    POSTOPERATIVE DIAGNOSIS:    Same   Normal colonoscopy   Diverticulosis was not identified.   Hemorrhoids  were  identified.    SURGEON: Dustin Baires MD    ASSISTANTS: None    ANESTHESIA: Monitored Anesthesia Care    COMPLICATIONS: None apparent    TRANSFUSIONS: None    TISSUE TO PATHOLOGY: None    FINDINGS: Normal colonoscopy.  Diverticulosis was not identified.  Hemorrhoids  were  identified.    INDICATIONS: This is a 66 year old male in need of a colonoscopy for Positive ColoGuard.  The patient will be taken to the endoscopy suite for that procedure.    DESCRIPTIONS OF PROCEDURE IN DETAIL: After consent was obtained the patient was taken to the endoscopy suite and placed in the left lateral decubitus position.  The patient was identified and the correct patient was confirmed.  Monitored Anesthesia Care was given.  A time out was performed verifying the correct patient and the correct procedure.  The entire operative team was in agreement.  All necessary equipment and supplies were in the room.    Rectal exam was performed and no lesions of the anal canal were noted.  The colonoscope was inserted into the anus and passed without difficulty to the cecum.  The cecum was identified by the ileocecal valve, the coalescence of the tinea and the appendiceal orifice.  Upon withdrawal all walls of the colon were visualized.  There were no polyps, masses or evidence of colitis seen.  Diverticulosis was not seen.  Upon reaching the rectum the scope was retroflexed and internal hemorrhoids  were  seen.  The scope was straightened back out and removed from the patient.  The patient was then taken to the recovery room in stable condition tolerating the procedure well.      Prep: poor    Withdrawal time was 6 minutes.    It is recommended that the patient have  another colonoscopy in 10 years.

## 2021-11-11 NOTE — ANESTHESIA POSTPROCEDURE EVALUATION
Patient: Jaxson Ramirez    Procedure: Procedure(s):  colonoscopy         Diagnosis:Positive colorectal cancer screening using Cologuard test [R19.5]  Diagnosis Additional Information: No value filed.    Anesthesia Type:  MAC    Note:  Disposition: Outpatient   Postop Pain Control: Uneventful            Sign Out: Well controlled pain   PONV: No   Neuro/Psych: Uneventful            Sign Out: Acceptable/Baseline neuro status   Airway/Respiratory: Uneventful            Sign Out: Acceptable/Baseline resp. status   CV/Hemodynamics: Uneventful            Sign Out: Acceptable CV status; No obvious hypovolemia; No obvious fluid overload   Other NRE: NONE   DID A NON-ROUTINE EVENT OCCUR? No           Last vitals:  Vitals Value Taken Time   /89 11/11/21 1450   Temp     Pulse 71 11/11/21 1450   Resp 16 11/11/21 1450   SpO2 81 % 11/11/21 1451   Vitals shown include unvalidated device data.    Electronically Signed By: JAYA Siegel CRNA  November 11, 2021  3:01 PM

## 2021-11-11 NOTE — ANESTHESIA CARE TRANSFER NOTE
Patient: Jaxson Ramirez    Procedure: Procedure(s):  colonoscopy         Diagnosis: Positive colorectal cancer screening using Cologuard test [R19.5]  Diagnosis Additional Information: No value filed.    Anesthesia Type:   MAC     Note:    Oropharynx: spontaneously breathing  Level of Consciousness: awake and drowsy  Oxygen Supplementation: nasal cannula    Independent Airway: airway patency satisfactory and stable  Dentition: dentition unchanged  Vital Signs Stable: post-procedure vital signs reviewed and stable  Report to RN Given: handoff report given  Patient transferred to: Phase II    Handoff Report: Identifed the Patient, Identified the Reponsible Provider, Reviewed the pertinent medical history, Discussed the surgical course, Reviewed Intra-OP anesthesia mangement and issues during anesthesia, Set expectations for post-procedure period and Allowed opportunity for questions and acknowledgement of understanding      Vitals:  Vitals Value Taken Time   BP     Temp     Pulse     Resp     SpO2         Electronically Signed By: JAYA Jones CRNA  November 11, 2021  2:30 PM

## 2021-11-11 NOTE — DISCHARGE INSTRUCTIONS
Post-Anesthesia Patient Instructions    IMMEDIATELY FOLLOWING SURGERY:  Do not drive or operate machinery for the first twenty four hours after surgery.  Do not make any important decisions for twenty four hours after surgery or while taking narcotic pain medications or sedatives.  If you develop intractable nausea and vomiting or a severe headache please notify your doctor immediately.    FOLLOW-UP:  Please make an appointment with your surgeon as instructed. You do not need to follow up with anesthesia unless specifically instructed to do so.    WOUND CARE INSTRUCTIONS (if applicable):  Keep a dry clean dressing on the anesthesia/puncture wound site if there is drainage.  Once the wound has quit draining you may leave it open to air.  Generally you should leave the bandage intact for twenty four hours unless there is drainage.  If the epidural site drains for more than 36-48 hours please call the anesthesia department.    QUESTIONS?:  Please feel free to call your physician or the hospital  if you have any questions, and they will be happy to assist you.             INSTRUCTIONS AFTER COLONOSCOPY    WHEN YOU ARE BACK HOME:    Plan to rest for an hour or two after you get home.    You may have some cramping or pressure until you pass gas.    You may resume your regular medications.    Eat a small, light meal at first, and then gradually return to normal meal sizes.    You will be contacted the next day to see how you are doing.  If you had a polyp removed:    Slight bleeding may occur.  You may have a slight blood stain on the toilet paper after a bowel movement.    To lessen the chance of bleeding, avoid heavy exercise for ONE WEEK.  This includes heavy lifting, vigorous sport activities, and heavy physical labor.  You may resume your normal sexual activity.      Avoid aspirin or aspirin products if instructed by your doctor.    If there is a polyp or biopsy, you will be contacted with results within  one week.     WHAT TO WATCH FOR:  Problems rarely occur after the exam; however, it is important for you to watch for early signs of possible problems.  If you have     Unusual pain in your abdomen    Nausea and vomiting that persists    Excessive bleeding    Black or bloody bowel movements    Fever or temperature above 100.6 F  Please call your doctor ([facility] [phone]) or go to the nearest hospital emergency room.

## 2021-11-11 NOTE — OR NURSING
Patient and responsible adult given discharge instructions with no questions regarding instructions. Dae score 20/20. Pain level 0/10.  Discharged from unit via ambulation. Patient discharged to home with wife.

## 2021-11-23 DIAGNOSIS — E78.5 HYPERLIPIDEMIA, UNSPECIFIED HYPERLIPIDEMIA TYPE: ICD-10-CM

## 2021-11-25 NOTE — TELEPHONE ENCOUNTER
Crestor      Last Written Prescription Date:  10/74/20  Last Fill Quantity: 90,   # refills: 3  Last Office Visit: 11/3/21  Future Office visit:    Next 5 appointments (look out 90 days)    Dec 01, 2021 11:20 AM  (Arrive by 11:05 AM)  SHORT with Annette William NP  [facility] [place] ([facility] [place] ) [address]  [place] [address]  [phone]           Routing refill request to provider for review/approval because:

## 2021-11-26 RX ORDER — ROSUVASTATIN CALCIUM 20 MG/1
TABLET, COATED ORAL
Qty: 90 TABLET | Refills: 0 | Status: SHIPPED | OUTPATIENT
Start: 2021-11-26 | End: 2022-08-19

## 2021-11-30 NOTE — PROGRESS NOTES
Assessment & Plan     Hyponatremia  At the patient's previous visit on 11/3/21, labs revealed hyponatremia. Sodium was 129. Updates labs were obtained in clinic today and showed sodium levels back to being within normal limits. We discussed that his previous drop in sodium could have been from several etiologies such as increase in alcohol or GI upset.     He has a follow up for chronic disease management scheduled with me in April. He was encouraged to follow up sooner if needed.       OLIVER Corral-S  [employer]      Annette William NP  [facility] - SUZANNE Puri is a 66 year old who presents for the following health issues:    Hyponatremia   During patient's last visit for a preoperative exam prior to his colonoscopy on 11/3/21, labs noted low sodium levels. Sodium was 129. We encouraged decreasing alcohol intake.     Today he states that he didn't cut back on alcohol and also didn't drink more water. He continues to drink around 10 beers a night. He has been feeling fine since his last visit. He says that his bowel habits have not completely returned to normal since his colonoscopy but are improving. Stools are still slightly loose. He denies changes in urinary frequency, hematuria, or dysuria.     He has had chronic elevated glucose levels in the past that manages with lifestyle. He also has elevated lipid levels that he manages with a statin. These have been monitored routinely and last assessed in October. He will follow up again in April.       History   Smoking Status     Current Every Day Smoker     Packs/day: 1.00   Smokeless Tobacco     Never Used     Comment: pt denied QP 12/4/19         Review of Systems   CONSTITUTIONAL: NEGATIVE for fever, chills, change in weight  INTEGUMENTARY/SKIN: NEGATIVE for worrisome rashes, moles or lesions  EYES: NEGATIVE for vision changes or irritation  ENT/MOUTH: NEGATIVE for ear, mouth and throat  "problems  RESP: NEGATIVE for significant cough or SOB  CV: NEGATIVE for chest pain, palpitations or peripheral edema  GI: NEGATIVE for nausea, abdominal pain, heartburn, or change in bowel habits   male: chronic frequency   MUSCULOSKELETAL: NEGATIVE for significant arthralgias or myalgia  NEURO: NEGATIVE for weakness, dizziness or paresthesias  ENDOCRINE: NEGATIVE for temperature intolerance, skin/hair changes  HEME: NEGATIVE for bleeding problems  PSYCHIATRIC: NEGATIVE for changes in mood or affect      Objective    /62 (BP Location: Left arm, Patient Position: Chair, Cuff Size: Adult Regular)   Pulse 72   Temp 97.8  F (36.6  C) (Tympanic)   Ht 1.702 m (5' 7\")   Wt 72.1 kg (159 lb)   SpO2 96%   BMI 24.90 kg/m    Body mass index is 24.9 kg/m .  Physical Exam   GENERAL: healthy, alert and no distress  EYES: Eyes grossly normal to inspection, PERRL and conjunctivae and sclerae normal  HENT: ear canals and TM's normal, nose and mouth without ulcers or lesions  NECK: no adenopathy, no asymmetry, masses, or scars and thyroid normal to palpation  RESP: lungs clear to auscultation - no rales, rhonchi or wheezes  CV: regular rate and rhythm, normal S1 S2, no S3 or S4, no murmur, click or rub, no peripheral edema and peripheral pulses strong  ABDOMEN: soft, nontender, no hepatosplenomegaly, no masses and bowel sounds normal  NEURO: Normal strength and tone, mentation intact and speech normal  PSYCH: mentation appears normal, affect normal/bright    Results for orders placed or performed in visit on 12/01/21 (from the past 24 hour(s))   Hemoglobin A1c   Result Value Ref Range    Estimated Average Glucose 108 mg/dL    Hemoglobin A1C 5.4 0.0 - 5.6 %   Comprehensive metabolic panel (BMP + Alb, Alk Phos, ALT, AST, Total. Bili, TP)   Result Value Ref Range    Sodium 133 133 - 144 mmol/L    Potassium 3.5 3.4 - 5.3 mmol/L    Chloride 98 94 - 109 mmol/L    Carbon Dioxide (CO2) 29 20 - 32 mmol/L    Anion Gap 6 3 - 14 " mmol/L    Urea Nitrogen 7 7 - 30 mg/dL    Creatinine 0.57 (L) 0.66 - 1.25 mg/dL    Calcium 9.4 8.5 - 10.1 mg/dL    Glucose 142 (H) 70 - 99 mg/dL    Alkaline Phosphatase 62 40 - 150 U/L    AST 22 0 - 45 U/L    ALT 27 0 - 70 U/L    Protein Total 7.7 6.8 - 8.8 g/dL    Albumin 3.8 3.4 - 5.0 g/dL    Bilirubin Total 0.9 0.2 - 1.3 mg/dL    GFR Estimate >90 >60 mL/min/1.73m2

## 2021-12-01 ENCOUNTER — LAB (OUTPATIENT)
Dept: LAB | Facility: OTHER | Age: 66
End: 2021-12-01
Payer: COMMERCIAL

## 2021-12-01 ENCOUNTER — OFFICE VISIT (OUTPATIENT)
Dept: FAMILY MEDICINE | Facility: OTHER | Age: 66
End: 2021-12-01
Attending: NURSE PRACTITIONER
Payer: COMMERCIAL

## 2021-12-01 VITALS
TEMPERATURE: 97.8 F | WEIGHT: 159 LBS | HEART RATE: 72 BPM | HEIGHT: 67 IN | BODY MASS INDEX: 24.96 KG/M2 | DIASTOLIC BLOOD PRESSURE: 62 MMHG | SYSTOLIC BLOOD PRESSURE: 118 MMHG | OXYGEN SATURATION: 96 %

## 2021-12-01 DIAGNOSIS — E87.1 HYPONATREMIA: Primary | ICD-10-CM

## 2021-12-01 DIAGNOSIS — R73.9 HYPERGLYCEMIA: ICD-10-CM

## 2021-12-01 DIAGNOSIS — I10 ESSENTIAL HYPERTENSION: ICD-10-CM

## 2021-12-01 DIAGNOSIS — E87.1 HYPONATREMIA: ICD-10-CM

## 2021-12-01 LAB
ALBUMIN SERPL-MCNC: 3.8 G/DL (ref 3.4–5)
ALP SERPL-CCNC: 62 U/L (ref 40–150)
ALT SERPL W P-5'-P-CCNC: 27 U/L (ref 0–70)
ANION GAP SERPL CALCULATED.3IONS-SCNC: 6 MMOL/L (ref 3–14)
AST SERPL W P-5'-P-CCNC: 22 U/L (ref 0–45)
BILIRUB SERPL-MCNC: 0.9 MG/DL (ref 0.2–1.3)
BUN SERPL-MCNC: 7 MG/DL (ref 7–30)
CALCIUM SERPL-MCNC: 9.4 MG/DL (ref 8.5–10.1)
CHLORIDE BLD-SCNC: 98 MMOL/L (ref 94–109)
CO2 SERPL-SCNC: 29 MMOL/L (ref 20–32)
CREAT SERPL-MCNC: 0.57 MG/DL (ref 0.66–1.25)
EST. AVERAGE GLUCOSE BLD GHB EST-MCNC: 108 MG/DL
GFR SERPL CREATININE-BSD FRML MDRD: >90 ML/MIN/1.73M2
GLUCOSE BLD-MCNC: 142 MG/DL (ref 70–99)
HBA1C MFR BLD: 5.4 % (ref 0–5.6)
POTASSIUM BLD-SCNC: 3.5 MMOL/L (ref 3.4–5.3)
PROT SERPL-MCNC: 7.7 G/DL (ref 6.8–8.8)
SODIUM SERPL-SCNC: 133 MMOL/L (ref 133–144)

## 2021-12-01 PROCEDURE — G0463 HOSPITAL OUTPT CLINIC VISIT: HCPCS

## 2021-12-01 PROCEDURE — G0463 HOSPITAL OUTPT CLINIC VISIT: HCPCS | Mod: 25

## 2021-12-01 PROCEDURE — 80053 COMPREHEN METABOLIC PANEL: CPT | Mod: ZL

## 2021-12-01 PROCEDURE — 36415 COLL VENOUS BLD VENIPUNCTURE: CPT | Mod: ZL

## 2021-12-01 PROCEDURE — 99213 OFFICE O/P EST LOW 20 MIN: CPT | Performed by: NURSE PRACTITIONER

## 2021-12-01 PROCEDURE — 83036 HEMOGLOBIN GLYCOSYLATED A1C: CPT | Mod: ZL

## 2021-12-01 ASSESSMENT — MIFFLIN-ST. JEOR: SCORE: 1454.85

## 2021-12-01 ASSESSMENT — PAIN SCALES - GENERAL: PAINLEVEL: NO PAIN (0)

## 2021-12-01 NOTE — NURSING NOTE
"Chief Complaint   Patient presents with     COPD     follow up        Initial /62 (BP Location: Left arm, Patient Position: Chair, Cuff Size: Adult Regular)   Pulse 72   Temp 97.8  F (36.6  C) (Tympanic)   Ht 1.702 m (5' 7\")   Wt 72.1 kg (159 lb)   SpO2 96%   BMI 24.90 kg/m   Estimated body mass index is 24.9 kg/m  as calculated from the following:    Height as of this encounter: 1.702 m (5' 7\").    Weight as of this encounter: 72.1 kg (159 lb).  Medication Reconciliation: complete  Francesca Ceballos LPN  "

## 2021-12-07 ENCOUNTER — HOSPITAL ENCOUNTER (OUTPATIENT)
Dept: RESPIRATORY THERAPY | Facility: HOSPITAL | Age: 66
Discharge: HOME | End: 2021-12-07
Attending: NURSE PRACTITIONER | Admitting: INTERNAL MEDICINE
Payer: COMMERCIAL

## 2021-12-07 DIAGNOSIS — J43.9 PULMONARY EMPHYSEMA, UNSPECIFIED EMPHYSEMA TYPE (H): ICD-10-CM

## 2021-12-07 LAB — HGB BLD-MCNC: 16.9 G/DL (ref 13.3–17.7)

## 2021-12-07 PROCEDURE — 94726 PLETHYSMOGRAPHY LUNG VOLUMES: CPT

## 2021-12-07 PROCEDURE — 94729 DIFFUSING CAPACITY: CPT

## 2021-12-07 PROCEDURE — 36415 COLL VENOUS BLD VENIPUNCTURE: CPT | Performed by: NURSE PRACTITIONER

## 2021-12-07 PROCEDURE — 94726 PLETHYSMOGRAPHY LUNG VOLUMES: CPT | Mod: 26 | Performed by: INTERNAL MEDICINE

## 2021-12-07 PROCEDURE — 94060 EVALUATION OF WHEEZING: CPT

## 2021-12-07 PROCEDURE — 250N000009 HC RX 250: Performed by: NURSE PRACTITIONER

## 2021-12-07 PROCEDURE — 94729 DIFFUSING CAPACITY: CPT | Mod: 26 | Performed by: INTERNAL MEDICINE

## 2021-12-07 PROCEDURE — 85018 HEMOGLOBIN: CPT | Performed by: NURSE PRACTITIONER

## 2021-12-07 PROCEDURE — 94060 EVALUATION OF WHEEZING: CPT | Mod: 26 | Performed by: INTERNAL MEDICINE

## 2021-12-07 RX ORDER — ALBUTEROL SULFATE 0.83 MG/ML
2.5 SOLUTION RESPIRATORY (INHALATION) ONCE
Status: COMPLETED | OUTPATIENT
Start: 2021-12-07 | End: 2021-12-07

## 2021-12-07 RX ADMIN — ALBUTEROL SULFATE 2.5 MG: 2.5 SOLUTION RESPIRATORY (INHALATION) at 08:07

## 2022-01-31 DIAGNOSIS — I10 ESSENTIAL HYPERTENSION: ICD-10-CM

## 2022-02-02 RX ORDER — LOSARTAN POTASSIUM 100 MG/1
TABLET ORAL
Qty: 90 TABLET | Refills: 0 | Status: SHIPPED | OUTPATIENT
Start: 2022-02-02 | End: 2022-05-04

## 2022-02-09 DIAGNOSIS — J43.9 PULMONARY EMPHYSEMA, UNSPECIFIED EMPHYSEMA TYPE (H): ICD-10-CM

## 2022-02-09 NOTE — TELEPHONE ENCOUNTER
breo      Last Written Prescription Date:  9/20//21  Last Fill Quantity: 60,   # refills: 3  Last Office Visit: 11/3/21  Future Office visit:    Next 5 appointments (look out 90 days)    Apr 12, 2022  8:20 AM  (Arrive by 8:05 AM)  SHORT with Annette William NP  [facility] - [place] ([facility] - [place] ) [address]  [place] [address]  [phone]

## 2022-04-11 PROBLEM — Z12.11 COLON CANCER SCREENING: Status: RESOLVED | Noted: 2020-09-25 | Resolved: 2022-04-11

## 2022-04-11 NOTE — PROGRESS NOTES
Assessment & Plan     Pulmonary emphysema, unspecified emphysema type (H)  Hemoptysis  Breathing slightly worse. Using the Breo daily with albuterol about 3-4 times per day. Smoking more. Cessation encouraged. Oxygen sats stable at 96 percent. Also recently started coughing up blood. CBC pending. Will also proceed with chest CT with contrast. Once results are back, will most likely refer to pulmonary.     Hyperlipidemia, unspecified hyperlipidemia type  Well controlled. Tolerating statin. ALT and AST normal. Will continue.     Elevated fasting glucose  A1C 5.2. Normal. Reviewed pathogenesis of pre-diabetes. Stressed importance of cutting out sugars/carbs and engaging in routine physical exercise for 30 minutes/5 days of work with the goal of gradual weight loss.    Essential hypertension  Well controlled. Continue current medications. Encouraged daily exercise and a low sodium diet. Recommended checking BP's 2x/wk, call the clinic if consistantly s>140 or d>90. Follow up in 6 months.     Tobacco abuse  Cessation encouraged. Smoking 1.5 ppd.    Alcohol consumption heavy  Drinking 8-10 beers per night. Encouraged to limit.     Hyponatremia  Stable. Will continue to monitor.         Return in about 6 months (around 10/12/2022).    Annette William NP  [facility] - SUZANNE Puri is a 66 year old who presents for the following health issues     HPI     Hyperlipidemia Follow-Up       Are you regularly taking any medication or supplement to lower your cholesterol?   Yes, Crestor 20 mg; rarely forgets     Are you having muscle aches or other side effects that you think could be caused by your cholesterol lowering medication?  No     Denies chest pain, dizziness, syncope, or palpitations. No lower leg swelling. Some shortness of breath r/t his COPD.     On asa.      H/O pre-DM, A1C was 5.4 on 12/1/21. Stays active.      Hypertension Follow-up       Do you check your blood pressure regularly  outside of the clinic? No     Are you following a low salt diet? Yes- doesn't add extra salt    Are your blood pressures ever more than 140 on the top number (systolic) OR more       than 90 on the bottom number (diastolic), for example 140/90? No   -Taking metoprolol succinate 200 mg, Norvasc 10 mg, and losartan 100 mg without side effects.  -As noted above, he denies chest pain, dizziness, syncope, or palpitations. Some shortness of breath r/t his COPD. No lower leg swelling.   -Does consume large amounts of alcohol, drinking 8-10 beers per night.   -Smoking 1.5 ppd.      COPD Follow-Up    Overall, how are your COPD symptoms since your last clinic visit?  Slightly worse; has been coughing up blood occasionally since he had PFTs done on 12/7/21      How much fatigue or shortness of breath do you have when you are walking?  Same as usual    How much shortness of breath do you have when you are resting?  Same as usual    How often do you cough? Sometimes    Have you noticed any change in your sputum/phlegm?  No    Have you experienced a recent fever? No    Please describe how far you can walk without stopping to rest:  2-5 blocks    How many flights of stairs are you able to walk up without stopping?  2    Have you had any Emergency Room Visits, Urgent Care Visits, or Hospital Admissions because of your COPD since your last office visit?  No     PFTs done on 12/2021 and showed severe obstruction.     Using Breo without side effects. Using his albuterol 3-4 times per day. Does get relief when using this.     Was using Spiriva in the past, but insurance would no longer cover this.     He continues to smoke, currently smoking 1.5 ppd.     He did have a low dose chest CT in 10/2021, this was negative.    No fevers. No unintentional weight loss.     No nausea or vomiting.      Due for the pneumococcal vaccine. Willing to get.       Review of Systems   Constitutional, HEENT, cardiovascular, pulmonary, gi and gu systems are  "negative, except as otherwise noted.      Objective    /70 (BP Location: Left arm, Patient Position: Sitting, Cuff Size: Adult Regular)   Pulse 76   Temp 97.1  F (36.2  C) (Tympanic)   Ht 1.702 m (5' 7\")   Wt 70.9 kg (156 lb 6.4 oz)   SpO2 96%   BMI 24.50 kg/m    Body mass index is 24.5 kg/m .  Physical Exam   GENERAL: healthy, alert and no distress  EYES: Eyes grossly normal to inspection, PERRL and conjunctivae and sclerae normal  HENT: ear canals and TM's normal, nose and mouth without ulcers or lesions  NECK: no adenopathy, no asymmetry, masses, or scars and thyroid normal to palpation  RESP: expiratory wheezes throughout  CV: regular rate and rhythm, normal S1 S2, no S3 or S4, no murmur, click or rub, no peripheral edema  ABDOMEN: soft, nontender, no masses and bowel sounds normal  NEURO: Normal strength and tone, mentation intact and speech normal  PSYCH: mentation appears normal, affect normal/bright    PROCEDURE: XR CHEST 2 VW 4/12/2022 8:47 AM     HISTORY: Hemoptysis     COMPARISONS: CT 10/19/2021.     TECHNIQUE: 2 views.     FINDINGS: Heart is nonenlarged. No confluent infiltrate is seen. There  is no left-sided effusion.     There is some blunting of the right lateral costophrenic angle and  there may be a small effusion.     Degenerative changes seen in the mid thoracic spine.                                                                        IMPRESSION: Possible small right-sided pleural effusion.     TAMMIE DELATORRE MD       Results for orders placed or performed in visit on 04/12/22 (from the past 24 hour(s))   Extra Tube    Narrative    The following orders were created for panel order Extra Tube.  Procedure                               Abnormality         Status                     ---------                               -----------         ------                     Extra Green Top (Lithium...[[id number]]                      Final result               Extra Purple Top " Tube[[id number]]                            Final result                 Please view results for these tests on the individual orders.   Extra Green Top (Lithium Heparin) Tube   Result Value Ref Range    Hold Specimen JIC    Extra Purple Top Tube   Result Value Ref Range    Hold Specimen JIC    Comprehensive metabolic panel (BMP + Alb, Alk Phos, ALT, AST, Total. Bili, TP)   Result Value Ref Range    Sodium 128 (L) 133 - 144 mmol/L    Potassium 3.6 3.4 - 5.3 mmol/L    Chloride 95 94 - 109 mmol/L    Carbon Dioxide (CO2) 27 20 - 32 mmol/L    Anion Gap 6 3 - 14 mmol/L    Urea Nitrogen 6 (L) 7 - 30 mg/dL    Creatinine 0.46 (L) 0.66 - 1.25 mg/dL    Calcium 9.2 8.5 - 10.1 mg/dL    Glucose 121 (H) 70 - 99 mg/dL    Alkaline Phosphatase 66 40 - 150 U/L    AST 29 0 - 45 U/L    ALT 34 0 - 70 U/L    Protein Total 7.6 6.8 - 8.8 g/dL    Albumin 4.1 3.4 - 5.0 g/dL    Bilirubin Total 1.3 0.2 - 1.3 mg/dL    GFR Estimate >90 >60 mL/min/1.73m2   Hemoglobin A1c   Result Value Ref Range    Estimated Average Glucose 103 mg/dL    Hemoglobin A1C 5.2 0.0 - 5.6 %   Lipid Profile (Chol, Trig, HDL, LDL calc)   Result Value Ref Range    Cholesterol 164 <200 mg/dL    Triglycerides 57 <150 mg/dL    Direct Measure HDL 90 >=40 mg/dL    LDL Cholesterol Calculated 63 <=100 mg/dL    Non HDL Cholesterol 74 <130 mg/dL    Patient Fasting > 8hrs? Yes     Narrative    Cholesterol  Desirable:  <200 mg/dL    Triglycerides  Normal:  Less than 150 mg/dL  Borderline High:  150-199 mg/dL  High:  200-499 mg/dL  Very High:  Greater than or equal to 500 mg/dL    Direct Measure HDL  Female:  Greater than or equal to 50 mg/dL   Male:  Greater than or equal to 40 mg/dL    LDL Cholesterol  Desirable:  <100mg/dL  Above Desirable:  100-129 mg/dL   Borderline High:  130-159 mg/dL   High:  160-189 mg/dL   Very High:  >= 190 mg/dL    Non HDL Cholesterol  Desirable:  130 mg/dL  Above Desirable:  130-159 mg/dL  Borderline High:  160-189 mg/dL  High:  190-219 mg/dL  Very High:   Greater than or equal to 220 mg/dL

## 2022-04-12 ENCOUNTER — ANCILLARY PROCEDURE (OUTPATIENT)
Dept: GENERAL RADIOLOGY | Facility: OTHER | Age: 67
End: 2022-04-12
Attending: NURSE PRACTITIONER
Payer: COMMERCIAL

## 2022-04-12 ENCOUNTER — OFFICE VISIT (OUTPATIENT)
Dept: FAMILY MEDICINE | Facility: OTHER | Age: 67
End: 2022-04-12
Attending: NURSE PRACTITIONER
Payer: COMMERCIAL

## 2022-04-12 ENCOUNTER — LAB (OUTPATIENT)
Dept: LAB | Facility: OTHER | Age: 67
End: 2022-04-12
Attending: NURSE PRACTITIONER
Payer: COMMERCIAL

## 2022-04-12 VITALS
HEART RATE: 76 BPM | HEIGHT: 67 IN | DIASTOLIC BLOOD PRESSURE: 70 MMHG | WEIGHT: 156.4 LBS | BODY MASS INDEX: 24.55 KG/M2 | OXYGEN SATURATION: 96 % | TEMPERATURE: 97.1 F | SYSTOLIC BLOOD PRESSURE: 120 MMHG

## 2022-04-12 DIAGNOSIS — Z72.0 TOBACCO ABUSE: ICD-10-CM

## 2022-04-12 DIAGNOSIS — Z78.9 ALCOHOL CONSUMPTION HEAVY: ICD-10-CM

## 2022-04-12 DIAGNOSIS — R73.01 ELEVATED FASTING GLUCOSE: ICD-10-CM

## 2022-04-12 DIAGNOSIS — I10 ESSENTIAL HYPERTENSION: ICD-10-CM

## 2022-04-12 DIAGNOSIS — Z23 NEED FOR PNEUMOCOCCAL VACCINATION: ICD-10-CM

## 2022-04-12 DIAGNOSIS — R04.2 HEMOPTYSIS: ICD-10-CM

## 2022-04-12 DIAGNOSIS — E78.5 HYPERLIPIDEMIA, UNSPECIFIED HYPERLIPIDEMIA TYPE: ICD-10-CM

## 2022-04-12 DIAGNOSIS — E87.1 HYPONATREMIA: ICD-10-CM

## 2022-04-12 DIAGNOSIS — J43.9 PULMONARY EMPHYSEMA, UNSPECIFIED EMPHYSEMA TYPE (H): Primary | ICD-10-CM

## 2022-04-12 LAB
ALBUMIN SERPL-MCNC: 4.1 G/DL (ref 3.4–5)
ALP SERPL-CCNC: 66 U/L (ref 40–150)
ALT SERPL W P-5'-P-CCNC: 34 U/L (ref 0–70)
ANION GAP SERPL CALCULATED.3IONS-SCNC: 6 MMOL/L (ref 3–14)
AST SERPL W P-5'-P-CCNC: 29 U/L (ref 0–45)
BASOPHILS # BLD AUTO: 0 10E3/UL (ref 0–0.2)
BASOPHILS NFR BLD AUTO: 0 %
BILIRUB SERPL-MCNC: 1.3 MG/DL (ref 0.2–1.3)
BUN SERPL-MCNC: 6 MG/DL (ref 7–30)
CALCIUM SERPL-MCNC: 9.2 MG/DL (ref 8.5–10.1)
CHLORIDE BLD-SCNC: 95 MMOL/L (ref 94–109)
CHOLEST SERPL-MCNC: 164 MG/DL
CO2 SERPL-SCNC: 27 MMOL/L (ref 20–32)
CREAT SERPL-MCNC: 0.46 MG/DL (ref 0.66–1.25)
EOSINOPHIL # BLD AUTO: 0 10E3/UL (ref 0–0.7)
EOSINOPHIL NFR BLD AUTO: 0 %
ERYTHROCYTE [DISTWIDTH] IN BLOOD BY AUTOMATED COUNT: 13 % (ref 10–15)
EST. AVERAGE GLUCOSE BLD GHB EST-MCNC: 103 MG/DL
FASTING STATUS PATIENT QL REPORTED: YES
GFR SERPL CREATININE-BSD FRML MDRD: >90 ML/MIN/1.73M2
GLUCOSE BLD-MCNC: 121 MG/DL (ref 70–99)
HBA1C MFR BLD: 5.2 % (ref 0–5.6)
HCT VFR BLD AUTO: 51.8 % (ref 40–53)
HDLC SERPL-MCNC: 90 MG/DL
HGB BLD-MCNC: 18.8 G/DL (ref 13.3–17.7)
HOLD SPECIMEN: NORMAL
HOLD SPECIMEN: NORMAL
IMM GRANULOCYTES # BLD: 0 10E3/UL
IMM GRANULOCYTES NFR BLD: 0 %
LDLC SERPL CALC-MCNC: 63 MG/DL
LYMPHOCYTES # BLD AUTO: 1.2 10E3/UL (ref 0.8–5.3)
LYMPHOCYTES NFR BLD AUTO: 18 %
MCH RBC QN AUTO: 35.3 PG (ref 26.5–33)
MCHC RBC AUTO-ENTMCNC: 36.3 G/DL (ref 31.5–36.5)
MCV RBC AUTO: 97 FL (ref 78–100)
MONOCYTES # BLD AUTO: 0.8 10E3/UL (ref 0–1.3)
MONOCYTES NFR BLD AUTO: 11 %
NEUTROPHILS # BLD AUTO: 4.7 10E3/UL (ref 1.6–8.3)
NEUTROPHILS NFR BLD AUTO: 71 %
NONHDLC SERPL-MCNC: 74 MG/DL
NRBC # BLD AUTO: 0 10E3/UL
NRBC BLD AUTO-RTO: 0 /100
PLATELET # BLD AUTO: 118 10E3/UL (ref 150–450)
POTASSIUM BLD-SCNC: 3.6 MMOL/L (ref 3.4–5.3)
PROT SERPL-MCNC: 7.6 G/DL (ref 6.8–8.8)
RBC # BLD AUTO: 5.33 10E6/UL (ref 4.4–5.9)
SODIUM SERPL-SCNC: 128 MMOL/L (ref 133–144)
TRIGL SERPL-MCNC: 57 MG/DL
WBC # BLD AUTO: 6.7 10E3/UL (ref 4–11)

## 2022-04-12 PROCEDURE — G0463 HOSPITAL OUTPT CLINIC VISIT: HCPCS | Mod: 25

## 2022-04-12 PROCEDURE — 80061 LIPID PANEL: CPT | Mod: ZL

## 2022-04-12 PROCEDURE — 99214 OFFICE O/P EST MOD 30 MIN: CPT | Performed by: NURSE PRACTITIONER

## 2022-04-12 PROCEDURE — 83036 HEMOGLOBIN GLYCOSYLATED A1C: CPT | Mod: ZL

## 2022-04-12 PROCEDURE — 90732 PPSV23 VACC 2 YRS+ SUBQ/IM: CPT

## 2022-04-12 PROCEDURE — G0463 HOSPITAL OUTPT CLINIC VISIT: HCPCS

## 2022-04-12 PROCEDURE — 85004 AUTOMATED DIFF WBC COUNT: CPT | Mod: ZL

## 2022-04-12 PROCEDURE — 36415 COLL VENOUS BLD VENIPUNCTURE: CPT | Mod: ZL

## 2022-04-12 PROCEDURE — 80053 COMPREHEN METABOLIC PANEL: CPT | Mod: ZL

## 2022-04-12 PROCEDURE — 71046 X-RAY EXAM CHEST 2 VIEWS: CPT | Mod: TC

## 2022-04-12 ASSESSMENT — PAIN SCALES - GENERAL: PAINLEVEL: NO PAIN (0)

## 2022-04-12 NOTE — NURSING NOTE
"Chief Complaint   Patient presents with     Lipids     Hypertension     COPD       Initial There were no vitals taken for this visit. Estimated body mass index is 24.9 kg/m  as calculated from the following:    Height as of 12/1/21: 1.702 m (5' 7\").    Weight as of 12/1/21: 72.1 kg (159 lb).  Medication Reconciliation: complete  Francesca Ceballos LPN  "

## 2022-04-12 NOTE — NURSING NOTE
"Chief Complaint   Patient presents with     Lipids     Hypertension     COPD       Initial /70 (BP Location: Left arm, Patient Position: Sitting, Cuff Size: Adult Regular)   Pulse 76   Temp 97.1  F (36.2  C) (Tympanic)   Ht 1.702 m (5' 7\")   Wt 70.9 kg (156 lb 6.4 oz)   SpO2 96%   BMI 24.50 kg/m   Estimated body mass index is 24.5 kg/m  as calculated from the following:    Height as of this encounter: 1.702 m (5' 7\").    Weight as of this encounter: 70.9 kg (156 lb 6.4 oz).  Medication Reconciliation: complete  Latrice Castro LPN  "

## 2022-04-19 ENCOUNTER — HOSPITAL ENCOUNTER (OUTPATIENT)
Dept: CT IMAGING | Facility: HOSPITAL | Age: 67
Discharge: HOME | End: 2022-04-19
Attending: NURSE PRACTITIONER | Admitting: NURSE PRACTITIONER
Payer: COMMERCIAL

## 2022-04-19 DIAGNOSIS — J18.1 LUNG CONSOLIDATION (H): Primary | ICD-10-CM

## 2022-04-19 DIAGNOSIS — Z72.0 TOBACCO ABUSE: ICD-10-CM

## 2022-04-19 DIAGNOSIS — R04.2 HEMOPTYSIS: ICD-10-CM

## 2022-04-19 PROCEDURE — 71260 CT THORAX DX C+: CPT

## 2022-04-19 PROCEDURE — 250N000011 HC RX IP 250 OP 636: Performed by: RADIOLOGY

## 2022-04-19 RX ORDER — IOPAMIDOL 755 MG/ML
70 INJECTION, SOLUTION INTRAVASCULAR ONCE
Status: COMPLETED | OUTPATIENT
Start: 2022-04-19 | End: 2022-04-19

## 2022-04-19 RX ORDER — DOXYCYCLINE HYCLATE 100 MG
100 TABLET ORAL 2 TIMES DAILY
Qty: 20 TABLET | Refills: 0 | Status: SHIPPED | OUTPATIENT
Start: 2022-04-19 | End: 2022-04-29

## 2022-04-19 RX ADMIN — IOPAMIDOL 70 ML: 755 INJECTION, SOLUTION INTRAVENOUS at 10:48

## 2022-05-02 DIAGNOSIS — I10 ESSENTIAL HYPERTENSION: ICD-10-CM

## 2022-05-04 RX ORDER — LOSARTAN POTASSIUM 100 MG/1
TABLET ORAL
Qty: 90 TABLET | Refills: 0 | Status: SHIPPED | OUTPATIENT
Start: 2022-05-04 | End: 2022-08-04

## 2022-05-04 NOTE — TELEPHONE ENCOUNTER
Losartan 100 mg       Last Written Prescription Date:  2-2-2022  Last Fill Quantity: 90,   # refills: 0  Last Office Visit:   Future Office visit:

## 2022-05-22 DIAGNOSIS — I10 ESSENTIAL HYPERTENSION: ICD-10-CM

## 2022-05-22 DIAGNOSIS — J43.9 PULMONARY EMPHYSEMA, UNSPECIFIED EMPHYSEMA TYPE (H): ICD-10-CM

## 2022-05-24 RX ORDER — ALBUTEROL SULFATE 90 UG/1
AEROSOL, METERED RESPIRATORY (INHALATION)
Qty: 18 G | Refills: 3 | Status: SHIPPED | OUTPATIENT
Start: 2022-05-24 | End: 2023-01-16

## 2022-05-24 RX ORDER — METOPROLOL SUCCINATE 200 MG/1
TABLET, EXTENDED RELEASE ORAL
Qty: 90 TABLET | Refills: 3 | Status: SHIPPED | OUTPATIENT
Start: 2022-05-24 | End: 2023-09-06

## 2022-06-20 DIAGNOSIS — I10 ESSENTIAL HYPERTENSION: ICD-10-CM

## 2022-06-21 RX ORDER — AMLODIPINE BESYLATE 10 MG/1
TABLET ORAL
Qty: 90 TABLET | Refills: 0 | Status: SHIPPED | OUTPATIENT
Start: 2022-06-21 | End: 2022-09-20

## 2022-06-21 NOTE — TELEPHONE ENCOUNTER
Norvasc 10 mg    Last Written Prescription Date:  4-7-2021  Last Fill Quantity: 90,   # refills: 3  Last Office Visit: 4-  Future Office visit:   10-

## 2022-07-25 DIAGNOSIS — J43.9 PULMONARY EMPHYSEMA, UNSPECIFIED EMPHYSEMA TYPE (H): ICD-10-CM

## 2022-08-03 DIAGNOSIS — I10 ESSENTIAL HYPERTENSION: ICD-10-CM

## 2022-08-04 PROBLEM — C34.90 LUNG CANCER (H): Status: ACTIVE | Noted: 2022-08-04

## 2022-08-04 RX ORDER — LOSARTAN POTASSIUM 100 MG/1
TABLET ORAL
Qty: 90 TABLET | Refills: 0 | Status: SHIPPED | OUTPATIENT
Start: 2022-08-04 | End: 2022-11-08

## 2022-08-04 NOTE — TELEPHONE ENCOUNTER
Losartan       Last Written Prescription Date:  5-4-22  Last Fill Quantity: 90,   # refills: 0  Last Office Visit: 4-12-22  Future Office visit:    Next 5 appointments (look out 90 days)    Oct 14, 2022  8:40 AM  (Arrive by 8:25 AM)  SHORT with Annette William NP  [facility] - [place] ([facility] [place] [facility] [place] ) [address]  [place] [address]  [phone]

## 2022-08-18 DIAGNOSIS — E78.5 HYPERLIPIDEMIA, UNSPECIFIED HYPERLIPIDEMIA TYPE: ICD-10-CM

## 2022-08-19 DIAGNOSIS — E78.5 HYPERLIPIDEMIA, UNSPECIFIED HYPERLIPIDEMIA TYPE: ICD-10-CM

## 2022-08-19 RX ORDER — ROSUVASTATIN CALCIUM 20 MG/1
20 TABLET, COATED ORAL DAILY
Qty: 90 TABLET | Refills: 0 | OUTPATIENT
Start: 2022-08-19

## 2022-08-19 RX ORDER — ROSUVASTATIN CALCIUM 20 MG/1
TABLET, COATED ORAL
Qty: 90 TABLET | Refills: 0 | Status: SHIPPED | OUTPATIENT
Start: 2022-08-19 | End: 2022-12-21

## 2022-08-31 DIAGNOSIS — J43.9 PULMONARY EMPHYSEMA, UNSPECIFIED EMPHYSEMA TYPE (H): ICD-10-CM

## 2022-08-31 NOTE — TELEPHONE ENCOUNTER
Bety Anand  Last Written Prescription Date: 7/26/22  Last Fill Quantity: 60 # of Refills: 0  Last Office Visit: 4/12/22

## 2022-09-19 DIAGNOSIS — I10 ESSENTIAL HYPERTENSION: ICD-10-CM

## 2022-09-20 RX ORDER — AMLODIPINE BESYLATE 10 MG/1
TABLET ORAL
Qty: 90 TABLET | Refills: 0 | Status: SHIPPED | OUTPATIENT
Start: 2022-09-20 | End: 2022-11-08

## 2022-09-20 NOTE — TELEPHONE ENCOUNTER
norvasc      Last Written Prescription Date:  6/21/22  Last Fill Quantity: 90,   # refills: 0  Last Office Visit: 4/12/22  Future Office visit:    Next 5 appointments (look out 90 days)    Oct 14, 2022  8:40 AM  (Arrive by 8:25 AM)  SHORT with Annette William NP  [facility] - [place] ([facility] - [place] ) [address]  [place] [address]  [phone]

## 2022-10-11 NOTE — PROGRESS NOTES
Assessment & Plan     Hypoxia  ALEXANDRA (dyspnea on exertion)  Elevated d-dimer  Hyponatremia  Patient presented for chronic disease management and was very short of breath. Oxygen sats were 80 percent with activity. 2 liters of oxygen was applied and his sats increased to 92 percent. He noted that the shortness of breath had started 5 days prior. D-dimer was elevated. Sodium was 120. CXR with right hemithorax.     Report was given to the ER and he was escorted down.     Essential hypertension  Well controlled. Continue current medications. Encouraged daily exercise and a low sodium diet. Recommended checking BP's 2x/wk, call the clinic if consistantly s>140 or d>90. Follow up in 6 months.     Hyperlipidemia, unspecified hyperlipidemia type  Tolerating Crestor. Will continue for now.     Pulmonary emphysema, unspecified emphysema type (H)  Breathing worse. See above.     Elevated fasting glucose  A1C 6.0. Will further discuss once the above issues are addressed.     Elevated hemoglobin (H)  Low today. Will work up further once his acute issues are addressed.   - CBC with platelets and differential    Malignant neoplasm of lung, unspecified laterality, unspecified part of lung (H)  Will continue follow up with pulmonary.       Discussion of management or test interpretation with external physician/other qualified healthcare professional/appropriate source - called the ER  Ordering of each unique test  Prescription drug management  40 minutes spent on the date of the encounter doing chart review, review of test results, interpretation of tests, patient visit, documentation and discussion with family        Nicotine/Tobacco Cessation:  He reports that he has been smoking cigarettes. He has been smoking an average of .25 packs per day. He has never used smokeless tobacco.  Nicotine/Tobacco Cessation Plan:   Information offered: Patient not interested at this time      Annette William NP  [facility] -  SUZANNE Puri is a 67 year old accompanied by his spouse, presenting for the following health issues:  Lipids, Hypertension, and COPD       HPI     Hyperlipidemia Follow-Up      Are you regularly taking any medication or supplement to lower your cholesterol?   Yes- Crestor 20 mg    Are you having muscle aches or other side effects that you think could be caused by your cholesterol lowering medication?  No     Denies chest pain, dizziness, syncope, or palpitations. He does complain of significant shortness of breath for the past 5 days. Can barely walk down with stapleton without getting winded. He does have COPD and lung cancer.     Hypertension Follow-up      Do you check your blood pressure regularly outside of the clinic? No     Are you following a low salt diet? Yes    Are your blood pressures ever more than 140 on the top number (systolic) OR more   than 90 on the bottom number (diastolic), for example 140/90? N/A  -Taking losartan 100 mg with amlodipine 10 mg. No side effects.  -Denies chest pain, dizziness, syncope, or palpitations. He does complain of significant shortness of breath for the past 5 days. Can barely walk down with stapleton without getting winded. He does have COPD and lung cancer.     Impaired Fasting Glucose; A1C was normal on 4/12/22. Fasting glucoses around 109-138.     COPD Follow-Up    Overall, how are your COPD symptoms since your last clinic visit?  Much worse, can barely walk without getting short of breath    How much fatigue or shortness of breath do you have when you are walking?  More than usual    How much shortness of breath do you have when you are resting?  more    How often do you cough? Often    Have you noticed any change in your sputum/phlegm?  Yes- foamy     Have you experienced a recent fever? No    Please describe how far you can walk without stopping to rest:  The length of 3-5 rooms    How many flights of stairs are you able to walk up without stopping?   None    Have you had any Emergency Room Visits, Urgent Care Visits, or Hospital Admissions because of your COPD since your last office visit?  No     Follows with pulmonary.     Currently using the Breo Ellipta. He is unsure if this helps.     Using his albuterol 4-5 times incholson.     He notes that for the past 5 days, he can barely walk without getting short of breath. He also noticed that his legs started to swell 2 weeks ago. Swelling present bilaterally. No fevers. No new cough or cold like symptoms.     When seen in 4/2022, he did complain of a cough and hemoptysis. A chest CT was done and a consolidation was seen. See report. Unsure if it was a pneumonia or cancer. He was treated with doxycycline, but also sent to pulmonary. Biopsy did show a neoplasm. Currently following with pulmonary. Plan is to complete a right lower lobe lobectomy on 10/25/22. Depending on those results, his team will determine if he needs chemo and radiation.     He has cut back on his alcohol intake. Currently drinking 5 beers per night.     He has also cut back on his cigarette use. Was smoking 2 ppd. Now smoking 5 cigarettes per day.     No nausea or vomiting. No abdominal pain. No bloating.       History   Smoking Status     Every Day     Packs/day: 1.00     Types: Cigarettes   Smokeless Tobacco     Never     No results found for: FEV1, [id number]      Review of Systems   Constitutional, HEENT, cardiovascular, pulmonary, gi and gu systems are negative, except as otherwise noted.      Objective    /68   Pulse 99   Temp 97.1  F (36.2  C) (Tympanic)   Resp 26   Wt 73 kg (161 lb)   SpO2 92%   BMI 25.22 kg/m    Body mass index is 25.22 kg/m .  Physical Exam   GENERAL: alert and very short of breath when walking any distance  EYES: Eyes grossly normal to inspection, PERRL and conjunctivae and sclerae normal  HENT: ear canals and TM's normal, nose and mouth without ulcers or lesions  NECK: no adenopathy, no asymmetry, masses, or  scars and thyroid normal to palpation  RESP: crackles throughout lungs  CV: regular rate and rhythm, 2+ bilateral lower leg edema  ABDOMEN: soft, nontender, no masses and bowel sounds normal  NEURO: Normal strength and tone, mentation intact and speech normal  PSYCH: mentation appears normal, affect normal/bright    Results for orders placed or performed in visit on 10/14/22 (from the past 24 hour(s))   CBC with platelets and differential    Narrative    The following orders were created for panel order CBC with platelets and differential.  Procedure                               Abnormality         Status                     ---------                               -----------         ------                     CBC with platelets and d...[[id number]]  Abnormal            Final result                 Please view results for these tests on the individual orders.   Hemoglobin A1c   Result Value Ref Range    Estimated Average Glucose 126 mg/dL    Hemoglobin A1C 6.0 (H) <5.7 %   Basic metabolic panel   Result Value Ref Range    Sodium 120 (L) 136 - 145 mmol/L    Potassium 3.6 3.4 - 5.3 mmol/L    Chloride 82 (L) 98 - 107 mmol/L    Carbon Dioxide (CO2) 30 (H) 22 - 29 mmol/L    Anion Gap 8 7 - 15 mmol/L    Urea Nitrogen 5.1 (L) 8.0 - 23.0 mg/dL    Creatinine 0.37 (L) 0.67 - 1.17 mg/dL    Calcium 8.6 (L) 8.8 - 10.2 mg/dL    Glucose 174 (H) 70 - 99 mg/dL    GFR Estimate >90 >60 mL/min/1.73m2   CBC with platelets and differential   Result Value Ref Range    WBC Count 12.5 (H) 4.0 - 11.0 10e3/uL    RBC Count 4.31 (L) 4.40 - 5.90 10e6/uL    Hemoglobin 13.2 (L) 13.3 - 17.7 g/dL    Hematocrit 38.5 (L) 40.0 - 53.0 %    MCV 89 78 - 100 fL    MCH 30.6 26.5 - 33.0 pg    MCHC 34.3 31.5 - 36.5 g/dL    RDW 12.6 10.0 - 15.0 %    Platelet Count 317 150 - 450 10e3/uL    % Neutrophils 77 %    % Lymphocytes 11 %    % Monocytes 11 %    % Eosinophils 0 %    % Basophils 0 %    % Immature Granulocytes 1 %    NRBCs per 100 WBC 0 <1 /100    Absolute  Neutrophils 9.6 (H) 1.6 - 8.3 10e3/uL    Absolute Lymphocytes 1.3 0.8 - 5.3 10e3/uL    Absolute Monocytes 1.4 (H) 0.0 - 1.3 10e3/uL    Absolute Eosinophils 0.0 0.0 - 0.7 10e3/uL    Absolute Basophils 0.0 0.0 - 0.2 10e3/uL    Absolute Immature Granulocytes 0.1 <=0.4 10e3/uL    Absolute NRBCs 0.0 10e3/uL

## 2022-10-12 PROBLEM — R04.2 HEMOPTYSIS: Status: ACTIVE | Noted: 2022-07-11

## 2022-10-12 PROBLEM — R91.8 LUNG MASS: Status: ACTIVE | Noted: 2022-07-11

## 2022-10-14 ENCOUNTER — APPOINTMENT (OUTPATIENT)
Dept: ULTRASOUND IMAGING | Facility: HOSPITAL | Age: 67
End: 2022-10-14
Attending: PHYSICIAN ASSISTANT
Payer: COMMERCIAL

## 2022-10-14 ENCOUNTER — ANCILLARY PROCEDURE (OUTPATIENT)
Dept: GENERAL RADIOLOGY | Facility: OTHER | Age: 67
End: 2022-10-14
Attending: NURSE PRACTITIONER
Payer: COMMERCIAL

## 2022-10-14 ENCOUNTER — APPOINTMENT (OUTPATIENT)
Dept: GENERAL RADIOLOGY | Facility: HOSPITAL | Age: 67
End: 2022-10-14
Attending: PSYCHIATRY & NEUROLOGY
Payer: COMMERCIAL

## 2022-10-14 ENCOUNTER — APPOINTMENT (OUTPATIENT)
Dept: CT IMAGING | Facility: HOSPITAL | Age: 67
End: 2022-10-14
Attending: PHYSICIAN ASSISTANT
Payer: COMMERCIAL

## 2022-10-14 ENCOUNTER — LAB (OUTPATIENT)
Dept: LAB | Facility: OTHER | Age: 67
End: 2022-10-14
Payer: COMMERCIAL

## 2022-10-14 ENCOUNTER — HOSPITAL ENCOUNTER (EMERGENCY)
Facility: HOSPITAL | Age: 67
Discharge: HOME | End: 2022-10-14
Attending: EMERGENCY MEDICINE | Admitting: PHYSICIAN ASSISTANT
Payer: COMMERCIAL

## 2022-10-14 ENCOUNTER — OFFICE VISIT (OUTPATIENT)
Dept: FAMILY MEDICINE | Facility: OTHER | Age: 67
End: 2022-10-14
Attending: NURSE PRACTITIONER
Payer: COMMERCIAL

## 2022-10-14 VITALS
OXYGEN SATURATION: 92 % | HEART RATE: 89 BPM | SYSTOLIC BLOOD PRESSURE: 115 MMHG | DIASTOLIC BLOOD PRESSURE: 76 MMHG | TEMPERATURE: 99.2 F | RESPIRATION RATE: 18 BRPM

## 2022-10-14 VITALS
BODY MASS INDEX: 25.22 KG/M2 | RESPIRATION RATE: 26 BRPM | HEART RATE: 99 BPM | DIASTOLIC BLOOD PRESSURE: 68 MMHG | WEIGHT: 161 LBS | TEMPERATURE: 97.1 F | OXYGEN SATURATION: 92 % | SYSTOLIC BLOOD PRESSURE: 130 MMHG

## 2022-10-14 DIAGNOSIS — R06.09 DOE (DYSPNEA ON EXERTION): ICD-10-CM

## 2022-10-14 DIAGNOSIS — R09.02 HYPOXIA: ICD-10-CM

## 2022-10-14 DIAGNOSIS — I10 ESSENTIAL HYPERTENSION: Primary | ICD-10-CM

## 2022-10-14 DIAGNOSIS — C34.90 MALIGNANT NEOPLASM OF LUNG, UNSPECIFIED LATERALITY, UNSPECIFIED PART OF LUNG (H): ICD-10-CM

## 2022-10-14 DIAGNOSIS — J43.9 PULMONARY EMPHYSEMA, UNSPECIFIED EMPHYSEMA TYPE (H): ICD-10-CM

## 2022-10-14 DIAGNOSIS — R73.01 ELEVATED FASTING GLUCOSE: ICD-10-CM

## 2022-10-14 DIAGNOSIS — J90 PLEURAL EFFUSION: ICD-10-CM

## 2022-10-14 DIAGNOSIS — E78.5 HYPERLIPIDEMIA, UNSPECIFIED HYPERLIPIDEMIA TYPE: ICD-10-CM

## 2022-10-14 DIAGNOSIS — R79.89 ELEVATED D-DIMER: ICD-10-CM

## 2022-10-14 DIAGNOSIS — D58.2 ELEVATED HEMOGLOBIN (H): ICD-10-CM

## 2022-10-14 DIAGNOSIS — E87.1 HYPONATREMIA: ICD-10-CM

## 2022-10-14 PROBLEM — R45.851 SUICIDAL IDEATION: Status: ACTIVE | Noted: 2022-10-14

## 2022-10-14 LAB
% MONONUCLEAR CELLS, BODY FLUID: 21 %
ALBUMIN SERPL BCG-MCNC: 2.7 G/DL (ref 3.5–5.2)
ALP SERPL-CCNC: 103 U/L (ref 40–129)
ALT SERPL W P-5'-P-CCNC: 48 U/L (ref 10–50)
ANION GAP SERPL CALCULATED.3IONS-SCNC: 8 MMOL/L (ref 7–15)
APPEARANCE FLD: ABNORMAL
AST SERPL W P-5'-P-CCNC: 61 U/L (ref 10–50)
BASOPHILS # BLD AUTO: 0 10E3/UL (ref 0–0.2)
BASOPHILS NFR BLD AUTO: 0 %
BILIRUB DIRECT SERPL-MCNC: 0.28 MG/DL (ref 0–0.3)
BILIRUB SERPL-MCNC: 0.8 MG/DL
BUN SERPL-MCNC: 5.1 MG/DL (ref 8–23)
CALCIUM SERPL-MCNC: 8.6 MG/DL (ref 8.8–10.2)
CELL COUNT BODY FLUID SOURCE: ABNORMAL
CHLORIDE SERPL-SCNC: 82 MMOL/L (ref 98–107)
COLOR FLD: YELLOW
CREAT SERPL-MCNC: 0.37 MG/DL (ref 0.67–1.17)
D DIMER PPP FEU-MCNC: 3.44 UG/ML FEU (ref 0–0.5)
DEPRECATED HCO3 PLAS-SCNC: 30 MMOL/L (ref 22–29)
EOSINOPHIL # BLD AUTO: 0 10E3/UL (ref 0–0.7)
EOSINOPHIL NFR BLD AUTO: 0 %
ERYTHROCYTE [DISTWIDTH] IN BLOOD BY AUTOMATED COUNT: 12.6 % (ref 10–15)
EST. AVERAGE GLUCOSE BLD GHB EST-MCNC: 126 MG/DL
FLUAV RNA SPEC QL NAA+PROBE: NEGATIVE
FLUBV RNA RESP QL NAA+PROBE: NEGATIVE
GFR SERPL CREATININE-BSD FRML MDRD: >90 ML/MIN/1.73M2
GLUCOSE SERPL-MCNC: 174 MG/DL (ref 70–99)
HBA1C MFR BLD: 6 %
HCT VFR BLD AUTO: 38.5 % (ref 40–53)
HGB BLD-MCNC: 13.2 G/DL (ref 13.3–17.7)
HOLD SPECIMEN: NORMAL
IMM GRANULOCYTES # BLD: 0.1 10E3/UL
IMM GRANULOCYTES NFR BLD: 1 %
INR PPP: 1.15 (ref 0.85–1.15)
LDH SERPL L TO P-CCNC: 164 U/L (ref 0–250)
LYMPHOCYTES # BLD AUTO: 1.3 10E3/UL (ref 0.8–5.3)
LYMPHOCYTES NFR BLD AUTO: 11 %
MCH RBC QN AUTO: 30.6 PG (ref 26.5–33)
MCHC RBC AUTO-ENTMCNC: 34.3 G/DL (ref 31.5–36.5)
MCV RBC AUTO: 89 FL (ref 78–100)
MONOCYTES # BLD AUTO: 1.4 10E3/UL (ref 0–1.3)
MONOCYTES NFR BLD AUTO: 11 %
NEUTROPHILS # BLD AUTO: 9.6 10E3/UL (ref 1.6–8.3)
NEUTROPHILS NFR BLD AUTO: 77 %
NEUTS BAND NFR FLD MANUAL: 79 %
NRBC # BLD AUTO: 0 10E3/UL
NRBC BLD AUTO-RTO: 0 /100
NT-PROBNP SERPL-MCNC: 489 PG/ML (ref 0–900)
PLATELET # BLD AUTO: 317 10E3/UL (ref 150–450)
POTASSIUM SERPL-SCNC: 3.6 MMOL/L (ref 3.4–5.3)
PROT SERPL-MCNC: 5.9 G/DL (ref 6.4–8.3)
PROT SERPL-MCNC: 6.4 G/DL (ref 6.4–8.3)
RBC # BLD AUTO: 4.31 10E6/UL (ref 4.4–5.9)
RBC # FLD: 3387 /UL
RSV RNA SPEC NAA+PROBE: NEGATIVE
SARS-COV-2 RNA RESP QL NAA+PROBE: NEGATIVE
SODIUM SERPL-SCNC: 120 MMOL/L (ref 136–145)
WBC # BLD AUTO: 12.5 10E3/UL (ref 4–11)
WBC # FLD AUTO: 2487 /UL

## 2022-10-14 PROCEDURE — 36415 COLL VENOUS BLD VENIPUNCTURE: CPT | Performed by: PHYSICIAN ASSISTANT

## 2022-10-14 PROCEDURE — 87116 MYCOBACTERIA CULTURE: CPT | Performed by: PHYSICIAN ASSISTANT

## 2022-10-14 PROCEDURE — 80048 BASIC METABOLIC PNL TOTAL CA: CPT | Mod: ZL | Performed by: NURSE PRACTITIONER

## 2022-10-14 PROCEDURE — 71275 CT ANGIOGRAPHY CHEST: CPT

## 2022-10-14 PROCEDURE — 83615 LACTATE (LD) (LDH) ENZYME: CPT | Performed by: PHYSICIAN ASSISTANT

## 2022-10-14 PROCEDURE — 85379 FIBRIN DEGRADATION QUANT: CPT | Mod: ZL

## 2022-10-14 PROCEDURE — 87075 CULTR BACTERIA EXCEPT BLOOD: CPT | Performed by: PSYCHIATRY & NEUROLOGY

## 2022-10-14 PROCEDURE — 36415 COLL VENOUS BLD VENIPUNCTURE: CPT | Mod: ZL

## 2022-10-14 PROCEDURE — 87637 SARSCOV2&INF A&B&RSV AMP PRB: CPT | Performed by: PHYSICIAN ASSISTANT

## 2022-10-14 PROCEDURE — 99285 EMERGENCY DEPT VISIT HI MDM: CPT | Performed by: PHYSICIAN ASSISTANT

## 2022-10-14 PROCEDURE — 93010 ELECTROCARDIOGRAM REPORT: CPT | Mod: 77 | Performed by: INTERNAL MEDICINE

## 2022-10-14 PROCEDURE — 84157 ASSAY OF PROTEIN OTHER: CPT | Performed by: PSYCHIATRY & NEUROLOGY

## 2022-10-14 PROCEDURE — 83880 ASSAY OF NATRIURETIC PEPTIDE: CPT | Mod: ZL

## 2022-10-14 PROCEDURE — 250N000009 HC RX 250: Performed by: PSYCHIATRY & NEUROLOGY

## 2022-10-14 PROCEDURE — 83615 LACTATE (LD) (LDH) ENZYME: CPT | Performed by: PSYCHIATRY & NEUROLOGY

## 2022-10-14 PROCEDURE — 87015 SPECIMEN INFECT AGNT CONCNTJ: CPT

## 2022-10-14 PROCEDURE — 84999 UNLISTED CHEMISTRY PROCEDURE: CPT

## 2022-10-14 PROCEDURE — 85025 COMPLETE CBC W/AUTO DIFF WBC: CPT | Mod: ZL | Performed by: NURSE PRACTITIONER

## 2022-10-14 PROCEDURE — 250N000011 HC RX IP 250 OP 636: Performed by: RADIOLOGY

## 2022-10-14 PROCEDURE — C9803 HOPD COVID-19 SPEC COLLECT: HCPCS

## 2022-10-14 PROCEDURE — 93005 ELECTROCARDIOGRAM TRACING: CPT | Mod: XU | Performed by: NURSE PRACTITIONER

## 2022-10-14 PROCEDURE — 82248 BILIRUBIN DIRECT: CPT | Mod: ZL

## 2022-10-14 PROCEDURE — 99285 EMERGENCY DEPT VISIT HI MDM: CPT | Mod: 25

## 2022-10-14 PROCEDURE — 85610 PROTHROMBIN TIME: CPT | Performed by: PHYSICIAN ASSISTANT

## 2022-10-14 PROCEDURE — 99215 OFFICE O/P EST HI 40 MIN: CPT | Performed by: NURSE PRACTITIONER

## 2022-10-14 PROCEDURE — 84155 ASSAY OF PROTEIN SERUM: CPT | Performed by: PHYSICIAN ASSISTANT

## 2022-10-14 PROCEDURE — 88305 TISSUE EXAM BY PATHOLOGIST: CPT | Mod: TC | Performed by: RADIOLOGY

## 2022-10-14 PROCEDURE — 88112 CYTOPATH CELL ENHANCE TECH: CPT | Mod: TC | Performed by: RADIOLOGY

## 2022-10-14 PROCEDURE — 89051 BODY FLUID CELL COUNT: CPT | Performed by: PSYCHIATRY & NEUROLOGY

## 2022-10-14 PROCEDURE — 83036 HEMOGLOBIN GLYCOSYLATED A1C: CPT | Mod: ZL | Performed by: NURSE PRACTITIONER

## 2022-10-14 PROCEDURE — 250N000013 HC RX MED GY IP 250 OP 250 PS 637: Performed by: PHYSICIAN ASSISTANT

## 2022-10-14 PROCEDURE — G0463 HOSPITAL OUTPT CLINIC VISIT: HCPCS | Mod: 25 | Performed by: NURSE PRACTITIONER

## 2022-10-14 PROCEDURE — 32555 ASPIRATE PLEURA W/ IMAGING: CPT

## 2022-10-14 PROCEDURE — 87070 CULTURE OTHR SPECIMN AEROBIC: CPT | Performed by: PHYSICIAN ASSISTANT

## 2022-10-14 PROCEDURE — 71046 X-RAY EXAM CHEST 2 VIEWS: CPT | Mod: TC,XU

## 2022-10-14 PROCEDURE — 82945 GLUCOSE OTHER FLUID: CPT | Performed by: PSYCHIATRY & NEUROLOGY

## 2022-10-14 PROCEDURE — 999N000065 XR CHEST PORT 1 VIEW

## 2022-10-14 RX ORDER — NICOTINE POLACRILEX 4 MG
15-30 LOZENGE BUCCAL
Status: DISCONTINUED | OUTPATIENT
Start: 2022-10-14 | End: 2022-10-14 | Stop reason: HOSPADM

## 2022-10-14 RX ORDER — IOPAMIDOL 755 MG/ML
58 INJECTION, SOLUTION INTRAVASCULAR ONCE
Status: COMPLETED | OUTPATIENT
Start: 2022-10-14 | End: 2022-10-14

## 2022-10-14 RX ORDER — LIDOCAINE 40 MG/G
CREAM TOPICAL
Status: DISCONTINUED | OUTPATIENT
Start: 2022-10-14 | End: 2022-10-14 | Stop reason: HOSPADM

## 2022-10-14 RX ORDER — ASPIRIN 81 MG/1
81 TABLET ORAL DAILY
COMMUNITY
End: 2024-08-12

## 2022-10-14 RX ORDER — LIDOCAINE HYDROCHLORIDE 10 MG/ML
INJECTION, SOLUTION EPIDURAL; INFILTRATION; INTRACAUDAL; PERINEURAL
Status: DISCONTINUED
Start: 2022-10-14 | End: 2022-10-14 | Stop reason: HOSPADM

## 2022-10-14 RX ORDER — DEXTROSE MONOHYDRATE 25 G/50ML
25-50 INJECTION, SOLUTION INTRAVENOUS
Status: DISCONTINUED | OUTPATIENT
Start: 2022-10-14 | End: 2022-10-14 | Stop reason: HOSPADM

## 2022-10-14 RX ORDER — LIDOCAINE HYDROCHLORIDE 10 MG/ML
10-20 INJECTION, SOLUTION EPIDURAL; INFILTRATION; INTRACAUDAL; PERINEURAL ONCE
Status: COMPLETED | OUTPATIENT
Start: 2022-10-14 | End: 2022-10-14

## 2022-10-14 RX ADMIN — NICOTINE POLACRILEX 4 MG: 2 GUM, CHEWING ORAL at 12:51

## 2022-10-14 RX ADMIN — IOPAMIDOL 58 ML: 755 INJECTION, SOLUTION INTRAVENOUS at 12:37

## 2022-10-14 RX ADMIN — LIDOCAINE HYDROCHLORIDE 5 ML: 10 INJECTION, SOLUTION EPIDURAL; INFILTRATION; INTRACAUDAL; PERINEURAL at 14:21

## 2022-10-14 ASSESSMENT — ACTIVITIES OF DAILY LIVING (ADL)
ADLS_ACUITY_SCORE: 35

## 2022-10-14 ASSESSMENT — PAIN SCALES - GENERAL: PAINLEVEL: NO PAIN (0)

## 2022-10-14 NOTE — ED PROVIDER NOTES
History     Chief Complaint   Patient presents with     Shortness of Breath     HPI  Jaxson Ramirez is a 67 year old male with a recent diagnosis of right lower lobe lung cancer currently awaiting surgery.  Resection of all who presents to the ER today for evaluation of shortness of breath.  Patient states on Monday he noticed that he was having significant shortness of breath despite all of his appointments done [facility] in [place].  Patient waited till today for evaluation at his primary care doctor.  Chest x-ray imaging was obtained today which noted a large effusion.  Patient was sent over here for further evaluation.  Lab work obtained at the clinic showed a markedly elevated D-dimer he had a sodium of 120 with a chloride of 82 and a BNP of 49.  Patient denies any chest pain has not had any fevers or chills no nausea vomiting or diarrhea.  No lightheadedness or dizziness.  Patient denies any abdominal pain he states that he has cut back on his smoking and is working and using nicotine gum still smokes a couple cigarettes a day.  He has reduced his alcohol intake from 10-12 beers a day to about 4-5 right now.  Continuing to work but decrease that.  Patient denies any night sweats no orthopnea no PND    Allergies:  Allergies   Allergen Reactions     Ace Inhibitors      Tramadol Hcl Diarrhea     Vomiting  Ultram         Problem List:    Patient Active Problem List    Diagnosis Date Noted     Suicidal ideation 10/14/2022     Priority: Medium     Malignant neoplasm of lung, unspecified laterality, unspecified part of lung (H) 08/04/2022     Priority: Medium     Lung mass 07/11/2022     Priority: Medium     Formatting of this note might be different from the original.  Added automatically from request for surgery [id number]       Hemoptysis 07/11/2022     Priority: Medium     Formatting of this note might be different from the original.  Added automatically from request for surgery [id number]       Positive colorectal  cancer screening using Cologuard test 09/25/2020     Priority: Medium     Added automatically from request for surgery [id number]       Essential hypertension 10/21/2019     Priority: Medium     Pulmonary emphysema, unspecified emphysema type (H) 10/21/2019     Priority: Medium     Tobacco abuse 10/21/2019     Priority: Medium     Hyperlipidemia, unspecified hyperlipidemia type 10/21/2019     Priority: Medium     Overweight (BMI 25.0-29.9) 04/10/2019     Priority: Medium     BPH associated with nocturia 10/09/2018     Priority: Medium     Alcohol consumption heavy 09/30/2015     Priority: Medium     Elevated fasting glucose 09/25/2013     Priority: Medium        Past Medical History:    Past Medical History:   Diagnosis Date     Cellulitis and abscess of oral soft tissues      COPD (chronic obstructive pulmonary disease) (H)      Hypertension        Past Surgical History:    Past Surgical History:   Procedure Laterality Date     cataract right       COLONOSCOPY N/A 11/11/2021    Procedure: colonoscopy  ;  Surgeon: Dustin Baires MD;  Location: [facility] OR     layngoscopy  2009    layngoscopy     UVULECTOMY         Family History:    Family History   Problem Relation Age of Onset     Hypertension Brother      Cancer Brother         renal     Liver Cancer Mother      Parkinsonism Father      Bladder Cancer Father         smoker       Social History:  Marital Status:   [2]  Social History     Tobacco Use     Smoking status: Every Day     Packs/day: 0.25     Types: Cigarettes     Smokeless tobacco: Never     Tobacco comments:     pt denied QP 12/4/19   Substance Use Topics     Alcohol use: Yes     Comment: 5 beer daily     Drug use: Yes     Types: Marijuana     Comment: daily        Medications:    albuterol (PROAIR HFA/PROVENTIL HFA/VENTOLIN HFA) 108 (90 Base) MCG/ACT inhaler  amLODIPine (NORVASC) 10 MG tablet  aspirin 81 MG EC tablet  fluticasone-vilanterol (BREO ELLIPTA) 100-25 MCG/INH inhaler  losartan  (COZAAR) 100 MG tablet  metoprolol succinate ER (TOPROL XL) 200 MG 24 hr tablet  rosuvastatin (CRESTOR) 20 MG tablet  ketoconazole (NIZORAL) 2 % external cream  triamcinolone (KENALOG) 0.1 % external ointment          Review of Systems   All other systems reviewed and are negative.      Physical Exam   BP: 118/77  Pulse: 83  Temp: 99.2  F (37.3  C)  Resp: 16  SpO2: 96 %      Physical Exam  Constitutional:       General: He is not in acute distress.     Appearance: Normal appearance. He is normal weight. He is not ill-appearing, toxic-appearing or diaphoretic.   HENT:      Head: Normocephalic and atraumatic.      Right Ear: External ear normal.      Left Ear: External ear normal.   Eyes:      Extraocular Movements: Extraocular movements intact.      Conjunctiva/sclera: Conjunctivae normal.      Pupils: Pupils are equal, round, and reactive to light.   Cardiovascular:      Rate and Rhythm: Normal rate.   Pulmonary:      Effort: Pulmonary effort is normal. No respiratory distress.   Musculoskeletal:         General: Normal range of motion.   Skin:     General: Skin is warm and dry.      Coloration: Skin is not jaundiced or pale.   Neurological:      Mental Status: He is alert and oriented to person, place, and time. Mental status is at baseline.      Cranial Nerves: No cranial nerve deficit.   Psychiatric:         Mood and Affect: Mood normal.         ED Course   I have reviewed the epic chart, the nurses note and triage note. vital signs were reviewed.  Reviewed the labs from the clinic.  As there is a markedly elevated D-dimer CT of the chest was obtained to evaluate for possibility of a PE however in the current setting.his effusion is likely causing his tachycardia and his shortness of breath I called over to interventional radiology trying to get him a thoracentesis today they were happy to oblige.  INR was added onto patient's labs currently patient was placed on some oxygen which brought his O2 to 93 to 96% he  is otherwise asymptomatic at the moment.    INR was added and was 1.15.  Otherwise his labs were done in the clinic today.    CTA of the chest was obtained because of elevated D-dimer which showed that the patient had no acute pulmonary emboli low-density lesion 7 x 8 cm concern for necrotic neoplasm new large loculated right pleural effusion.  Ultrasound thoracentesis was done patient had 450 cc of fluid pulled off.  Patient felt significantly better he was able to oxygenate at 93% on room air.  I spoke with the patient's cardiothoracic team in [place] at this time they did not feel the patient warranted (henrique durham) an admission.  They would like a follow-up chest x-ray in 1 week if symptoms are getting worse she is to return to the ER.  At this time the patient has no further questions he is safe for discharge.  They have a plan and are happy to follow through.      ED Course as of 10/14/22 2149   Fri Oct 14, 2022   1000 EKG CARDIAC - HIM SCAN  Reviewed the EKG which was obtained at 9:26 AM he has a noted right bundle branch block with a QRS of 144 ms sinus rhythm noted previous review of his EKG shows normal sinus rhythm no bundle branch block that was     Procedures             Results for orders placed or performed during the hospital encounter of 10/14/22 (from the past 24 hour(s))   Asymptomatic Influenza A/B & SARS-CoV2 (COVID-19) Virus PCR Multiplex Nose    Specimen: Nose; Swab   Result Value Ref Range    Influenza A PCR Negative Negative    Influenza B PCR Negative Negative    RSV PCR Negative Negative    SARS CoV2 PCR Negative Negative    Narrative    Testing was performed using the Xpert Xpress CoV2/Flu/RSV Assay on the [facility] GeneXpert Instrument. This test should be ordered for the detection of SARS-CoV-2 and influenza viruses in individuals who meet clinical and/or epidemiological criteria. Test performance is unknown in asymptomatic patients. This test is for in vitro diagnostic use under the FDA EUA  for laboratories certified under CLIA to perform high or moderate complexity testing. This test has not been FDA cleared or approved. A negative result does not rule out the presence of PCR inhibitors in the specimen or target RNA in concentration below the limit of detection for the assay. If only one viral target is positive but coinfection with multiple targets is suspected, the sample should be re-tested with another FDA cleared, approved, or authorized test, if coinfection would change clinical management. This test was validated by the [facility]. These laboratories are certified under the Clinical Laboratory Improvement Amendments of 1988 (CLIA-88) as qualified to perform high complexity laboratory testing.   CTA Chest with Contrast    Narrative    PROCEDURE:  CTA CHEST WITH CONTRAST.    HISTORY:  Evaluate for pulmonary embolism.  shortness of breath  elevated d dimer    TECHNIQUE:  Initial pre-contrast  and localizer images were  obtained.  Contrast enhanced helical CT pulmonary angiography was then  performed.  Routine transaxial and post-processed (multiplanar and/or  MIP) reformations were obtained. This CT exam was performed using one  or more the following dose reduction techniques: automated exposure  control, adjustment of the mA and/or kV according to patient size,  and/or iterative reconstruction technique.    COMPARISON:  4/19/2022    MEDS/CONTRAST: ISOVUE 370 58ml    PULMONARY CTA FINDINGS:  This is a diagnostic quality helical CT  pulmonary angiogram.  There is no acute pulmonary embolism to the  first subsegmental pulmonary artery level.    OTHER FINDINGS:      There is a 7.0 x 8.5 cm expansile low-density lesion within the right  lower lobe, in the vicinity of the previously seen 3.8 cm masslike  lesion described on 4/19/2022. Next    There is a new large loculated right pleural effusion. There is  subtotal atelectasis of the right lung.    The heart and mediastinum are  unchanged in appearance. A right  paratracheal lymph node is slightly larger, measuring 1.0 cm in short  axis.    Limited views of the upper abdomen demonstrate chronic thickening of  the left adrenal gland.    No suspicious osseous lesion is identified.      Impression    IMPRESSION:    No acute pulmonary emboli to the subsegmental level.    7 x 8 cm expansile low-density lesion at the right lung base.  Progressive (necrotic?) neoplasm, less likely (given time span since  prior) progressive atypical focal infection are in the differential.    New large loculated right pleural effusion with subtotal atelectasis  of the right lung.    SYDNI BYRNES MD         SYSTEM ID:  [id number]   Lactate Dehydrogenase   Result Value Ref Range    Lactate Dehydrogenase 164 0 - 250 U/L   Protein total   Result Value Ref Range    Protein Total 5.9 (L) 6.4 - 8.3 g/dL   INR   Result Value Ref Range    INR 1.15 0.85 - 1.15   Extra Tube    Narrative    The following orders were created for panel order Extra Tube.  Procedure                               Abnormality         Status                     ---------                               -----------         ------                     Extra Red Top Tube[[id number]]                               Final result               Extra Green Top (Lithium...[[id number]]                      Final result               Extra Purple Top Tube[[id number]]                            Final result                 Please view results for these tests on the individual orders.   Extra Red Top Tube   Result Value Ref Range    Hold Specimen OK    Extra Green Top (Lithium Heparin) Tube   Result Value Ref Range    Hold Specimen OK    Extra Purple Top Tube   Result Value Ref Range    Hold Specimen OK    Acid-Fast Bacilli Culture and Stain    Specimen: Pleural Cavity; Pleural fluid    Narrative    The following orders were created for panel order Acid-Fast Bacilli Culture and Stain.  Procedure                                Abnormality         Status                     ---------                               -----------         ------                     Acid-Fast Bacilli Cultur...[[id number]]                      In process                   Please view results for these tests on the individual orders.   Cell count with differential fluid    Narrative    The following orders were created for panel order Cell count with differential fluid.  Procedure                               Abnormality         Status                     ---------                               -----------         ------                     Cell Count Body Fluid[[id number]]        Abnormal            Final result               Differential Body Fluid[[id number]]                          Final result                 Please view results for these tests on the individual orders.   Cell Count Body Fluid   Result Value Ref Range    Color Yellow Colorless, Yellow    Clarity Cloudy (A) Clear    Total Nucleated Cells 2,487 /uL    RBC Count 3,387 /uL    Cell Count Fluid Source Pleural Cavity     Narrative    No reference ranges have been established.  This result  should be interpreted in the context of the patient's clinical condition and   compared to simultaneous measurement in the patient's blood.         Differential Body Fluid   Result Value Ref Range    % Polynuclear Cells, Body Fluid 79 %    % Mononuclear Cells, Body Fluid 21 %    Narrative    No reference ranges have been established. This result should be interpreted in the context of the patient's clinical condition and compared to simultaneous measurement in the patient's blood.   XR Chest Port 1 View    Narrative    Procedure:XR CHEST PORT 1 VIEW    Clinical history:Male, 67 years, Post Thoracentesis    Technique: Single view was obtained.    Comparison: Chest x-ray 10/14/2022    Findings: The cardiac silhouette is partially obscured by  opacification in the right hemithorax. The left heart border  is  normal. Left lung is clear. The pulmonary vasculature is normal.    The lungs again demonstrate opacification involving the majority the  right hemithorax. There is no evidence of postprocedure  pneumothorax/hydropneumothorax. Bony structures are unremarkable.      Impression    Impression:   Status post recent right-sided thoracentesis without evidence of  postprocedure pneumothorax/hydropneumothorax.     Opacification about the periphery of the right hemithorax is again  seen suggesting residual effusion and/or consolidation and atelectasis  within the right lung.    NOEMÍ JEAN BAPTISTE MD         SYSTEM ID:  [id number]   US Thoracentesis    Narrative    Exam:US THORACENTESIS.    History:67 years Male pleural effusion    Comparison:  CTA chest 10/14/2022    Technique: After informed consent was obtained, a timeout was  performed, satisfactorily identifying the patient and the site of the  procedure.    Ultrasound evaluation was performed, demonstrating moderate sized  complex multiloculated pleural effusion with numerous septations.    The patient was then placed in the upright position and prepped and  draped in usual sterile fashion. 4 mL of 1% lidocaine was then  instilled.    A 5 Occitan paracentesis catheter was then positioned. Tubing was  placed to vacuum suction. The catheter was repositioned on a number of  occasions to maximize fluid removal from as many of the small  loculations as possible.    50 mL of cloudy, yellowish-tan fluid was initially obtained and sent  for analysis.    A total of 450 milliliters and was removed. The patient tolerated the  procedure well.           Impression    Impression:  Technically successful ultrasound guided diagnostic and therapeutic  thoracentesis of the right hemithorax.    450 milliliters of fluid was removed. 50 mL of fluid was sent for  analysis.    No acute complication. Postprocedure x-ray: Demonstrates no evidence  of postprocedure  pneumothorax/hydropneumothorax.    Post procedure x-ray demonstrates persistent opacification along the  periphery of the right hemithorax likely related to residual loculated  fluid and consolidated/atelectatic portions of the right lung.    NOEMÍ JEAN BAPTISTE MD         SYSTEM ID:  [id number]       Medications   iopamidol (ISOVUE-370) solution 58 mL (58 mLs Intravenous Given 10/14/22 1237)   sodium chloride (PF) 0.9% PF flush 100 mL (100 mLs Intravenous Given 10/14/22 1237)   lidocaine (PF) (XYLOCAINE) 1 % injection 10-20 mL (5 mLs Subcutaneous Given by Other Clinician 10/14/22 2381)       Assessments & Plan (with Medical Decision Making)     I have reviewed the nursing notes.    I have reviewed the findings, diagnosis, plan and need for follow up with the patient.  Follow up as discussed in 1 week.  You should have a follow-up x-ray at that time.  If your shortness of breath returns you should return to the ER.  I have spoken with cardiothoracic surgery who is in agreement with this plan.    Discharge Medication List as of 10/14/2022  3:28 PM          Final diagnoses:   Pleural effusion       10/14/2022   [facility]     Domingo Echavarria PA-C  10/14/22 5645

## 2022-10-14 NOTE — NURSING NOTE
"Chief Complaint   Patient presents with     Lipids     Hypertension     COPD       Initial /68   Pulse 99   Temp 97.1  F (36.2  C) (Tympanic)   Resp 26   Wt 73 kg (161 lb)   SpO2 92%   BMI 25.22 kg/m   Estimated body mass index is 25.22 kg/m  as calculated from the following:    Height as of 4/12/22: 1.702 m (5' 7\").    Weight as of this encounter: 73 kg (161 lb).  Medication Reconciliation: complete  Aiyana Forrester LPN    "

## 2022-10-14 NOTE — DISCHARGE INSTRUCTIONS
Follow up as discussed in 1 week.  You should have a follow-up x-ray at that time.  If your shortness of breath returns you should return to the ER.  I have spoken with cardiothoracic surgery who is in agreement with this plan.      What to expect when you have contrast    During your exam, we will inject  contrast  into your vein or artery. (Contrast is a clear liquid with iodine in it. It shows up on X-rays.)    You may feel warm or hot. You may have a metal taste in your mouth and a slight upset stomach. You may also feel pressure near the kidneys and bladder. These effects will last about 1 to 3 minutes.    Please tell us if you have:   Sneezing    Itching   Hives    Swelling in the face   A hoarse voice   Breathing problems   Other new symptoms    Serious problems are rare.  They may include:   Irregular heartbeat    Seizures   Kidney failure             Tissue damage   Shock     Death    If you have any problems during the exam, we  will treat them right away.    When you get home    Call your hospital if you have any new symptoms in the next 2 days, like hives or swelling. (Phone numbers are at the bottom of this page.) Or call your family doctor.     If you have wheezing or trouble breathing, call 911.    Self-care  -Drink at least 4 extra glasses of water today.   This reduces the stress on your kidneys.  -Keep taking your regular medicines.    The contrast will pass out of your body in your  Urine(pee). This will happen in the next 24 hours. You  will not feel this. Your urine will not  change color.    If you have kidney problems or take metformin    Drink 4 to 8 large glasses of water for the next  2 days, if you are not on a fluid restriction.    ?If you take metformin (Glucophage or Glucovance) for diabetes, keep taking it.      ?Your kidney function tests are abnormal.  If you take Metformin, do not take it for 48 hours. Please go to your clinic for a blood test within 3 days after your exam before  "the restarting this medicine.     (Note to provider:please give patient prescription for lab tests.)    ?Special instructions: -    I have read and understand the above information.    Patient Sign Here:______________________________________Date:________Time:______    Staff Sign Here:________________________________________Date:_______Time:______      Radiology Departments:     ?[facility] [phone] ?[facility]: [phone]     ?[facility]: [phone] ?[facility]:[phone]      ?[facility]: [phone]  ?[facility]: [phone]  ?[facility]:[phone]    ?[facility]:[phone]  ?[facility]:[phone]    DISCHARGE INSTRUCTIONS  Thoracentesis    IMPORTANT: As you prepare for discharge, the following information will help you return to your best level of health.               This Information Is About Your Follow Up Care  Call your doctor if you do not get better. Call sooner if you feel worse. You can reach your doctor by calling their clinic phone number.                                    This Information Is About Procedures       THORACENTESIS (Pleural Fluid Aspiration).  This procedure involved the insertion of a needle into the space between your lungs and the membranes that surround them (pleural space). It was done to get a sample of the fluid, to relieve lung compression or to get a lung tissue sample.    Call your doctor if you have:  any blood or fluid leaking from the procedure site.  shortness of breath, dizziness, or heart palpitations (you feel like your heart is beating too fast or \"skipping beats\").  any new or severe symptoms.                        This Information Is About Your Illness and Diagnosis    PLEURAL EFFUSION.  Your lungs have an increased amount of fluid in them. This fluid makes it harder for you to breathe. The extra fluid may be caused by an infection, an injury to the chest or heart, kidney and liver disorders. This may take up to 2 months to feel better.    Follow these " instructions:  Slowly increase your normal activity.  Rest often.  Do not smoke.  Use a vaporizer or humidifier to help you breathe easier.  Continue to take any antibiotics ordered for you until they are gone.  Take deep breaths and cough often, about once an hour when you are awake.  Avoid catching a cold.    Call your doctor if you have:  increased shortness of breath or trouble breathing.  cold symptoms.  a fever.  chest pain.  blood in the mucus you cough up from your lungs.  any new problems or concerns.

## 2022-10-14 NOTE — PROGRESS NOTES
Procedure: US Thoracentesis, right    There were  no complications and patient has no symptoms..      Tolerated procedure well.    Patient to Procedure Room.  Sitting for procedure.  Right lung draining maureen  fluid.  Total fluid 450 ml.  Fluid to  be sent to lab.  Patient to X-ray for post procedure films.      Discharge to home once radiologist has reviewed the films and instructions given.    Radiologist:Dr. Saldana    Time Out: Prior to the start of the procedure and with procedural staff participation, I verbally confirmed the patient s identity using two indicators, relevant allergies, that the procedure was appropriate and matched the consent or emergent situation, and that the correct equipment/implants were available. Immediately prior to starting the procedure I conducted the Time Out with the procedural staff and re-confirmed the patient s name, procedure, and site/side. (The Joint Commission universal protocol was followed.)  Yes    Position:  Leaning over table     Pain:  0    Sedation:None. Local Anesthestic used  No sedation    Estimated Blood Loss: None     Condition: Stable    Comments: See dictated procedure note for full details     Susi Clayton RN

## 2022-10-14 NOTE — ED NOTES
Labs drawn. Ekg done, pt put in room. Removed from oxygen and sats decreased to 79%.  Placed back on oxygen at 2 lpm and increased to 95%/

## 2022-10-14 NOTE — ED NOTES
"Provider assessed puncture site.  Dressing clean dry and intact.  Pt denies SOB and \"feeling better\" after the procedure.   "

## 2022-10-14 NOTE — ED NOTES
Pulse ox teaching provided and patient able to demonstrate understanding of the education provided.

## 2022-10-14 NOTE — ED TRIAGE NOTES
Patient started feeling SOB on Monday. Has been progressively worse. Clinic appt today/ Patient was 80% after ambulating. When he came in. Up to 92% with rest. Placed on oxygen prior to coming down to the ED

## 2022-10-15 LAB
GLUCOSE BODY FLUID SOURCE: NORMAL
GLUCOSE FLD-MCNC: 66 MG/DL
LD BODY BODY FLUID SOURCE: NORMAL
LDH FLD L TO P-CCNC: 329 U/L
PROT FLD-MCNC: 3.9 G/DL
PROTEIN BODY FLUID SOURCE: NORMAL

## 2022-10-17 ENCOUNTER — TELEPHONE (OUTPATIENT)
Dept: FAMILY MEDICINE | Facility: OTHER | Age: 67
End: 2022-10-17

## 2022-10-17 ENCOUNTER — TELEPHONE (OUTPATIENT)
Dept: INTERVENTIONAL RADIOLOGY/VASCULAR | Facility: HOSPITAL | Age: 67
End: 2022-10-17

## 2022-10-17 NOTE — TELEPHONE ENCOUNTER
Patient was supposed to get Covid and flu shot on Friday when he was at clinic. Patient was brought to ER. Patient is wondering if he could get this scheduled ASAP. PLease call patient back [phone]

## 2022-10-19 ENCOUNTER — MEDICAL CORRESPONDENCE (OUTPATIENT)
Dept: HEALTH INFORMATION MANAGEMENT | Facility: HOSPITAL | Age: 67
End: 2022-10-19

## 2022-10-20 DIAGNOSIS — C34.31 PRIMARY MALIGNANT NEOPLASM OF RIGHT LOWER LOBE OF LUNG (H): Primary | ICD-10-CM

## 2022-10-21 ENCOUNTER — APPOINTMENT (OUTPATIENT)
Dept: GENERAL RADIOLOGY | Facility: HOSPITAL | Age: 67
End: 2022-10-21
Attending: EMERGENCY MEDICINE
Payer: COMMERCIAL

## 2022-10-21 ENCOUNTER — HOSPITAL ENCOUNTER (EMERGENCY)
Facility: HOSPITAL | Age: 67
Discharge: HOME | End: 2022-10-21
Attending: EMERGENCY MEDICINE | Admitting: EMERGENCY MEDICINE
Payer: COMMERCIAL

## 2022-10-21 VITALS
HEART RATE: 99 BPM | RESPIRATION RATE: 18 BRPM | SYSTOLIC BLOOD PRESSURE: 94 MMHG | DIASTOLIC BLOOD PRESSURE: 61 MMHG | TEMPERATURE: 100.2 F | OXYGEN SATURATION: 92 %

## 2022-10-21 DIAGNOSIS — J90 PLEURAL EFFUSION: ICD-10-CM

## 2022-10-21 LAB
% MONONUCLEAR CELLS, BODY FLUID: 61 %
ALBUMIN SERPL BCG-MCNC: 2.6 G/DL (ref 3.5–5.2)
ALP SERPL-CCNC: 98 U/L (ref 40–129)
ALT SERPL W P-5'-P-CCNC: 35 U/L (ref 10–50)
ANION GAP SERPL CALCULATED.3IONS-SCNC: 9 MMOL/L (ref 7–15)
APPEARANCE FLD: ABNORMAL
AST SERPL W P-5'-P-CCNC: 36 U/L (ref 10–50)
BACTERIA PLR CULT: NO GROWTH
BACTERIA PLR CULT: NORMAL
BASOPHILS # BLD AUTO: 0 10E3/UL (ref 0–0.2)
BASOPHILS NFR BLD AUTO: 0 %
BILIRUB SERPL-MCNC: 0.4 MG/DL
BUN SERPL-MCNC: 6.8 MG/DL (ref 8–23)
CALCIUM SERPL-MCNC: 8.1 MG/DL (ref 8.8–10.2)
CELL COUNT BODY FLUID SOURCE: ABNORMAL
CHLORIDE SERPL-SCNC: 88 MMOL/L (ref 98–107)
COLOR FLD: ABNORMAL
CREAT SERPL-MCNC: 0.37 MG/DL (ref 0.67–1.17)
DEPRECATED HCO3 PLAS-SCNC: 31 MMOL/L (ref 22–29)
EOSINOPHIL # BLD AUTO: 0 10E3/UL (ref 0–0.7)
EOSINOPHIL NFR BLD AUTO: 0 %
ERYTHROCYTE [DISTWIDTH] IN BLOOD BY AUTOMATED COUNT: 13.3 % (ref 10–15)
GFR SERPL CREATININE-BSD FRML MDRD: >90 ML/MIN/1.73M2
GLUCOSE SERPL-MCNC: 107 MG/DL (ref 70–99)
GRAM STAIN RESULT: ABNORMAL
GRAM STAIN RESULT: ABNORMAL
HCT VFR BLD AUTO: 35 % (ref 40–53)
HGB BLD-MCNC: 12.1 G/DL (ref 13.3–17.7)
HOLD SPECIMEN: NORMAL
HOLD SPECIMEN: NORMAL
IMM GRANULOCYTES # BLD: 0.1 10E3/UL
IMM GRANULOCYTES NFR BLD: 1 %
INR PPP: 1.12 (ref 0.85–1.15)
LDH SERPL L TO P-CCNC: 206 U/L (ref 0–250)
LYMPHOCYTES # BLD AUTO: 2 10E3/UL (ref 0.8–5.3)
LYMPHOCYTES NFR BLD AUTO: 16 %
MCH RBC QN AUTO: 30.5 PG (ref 26.5–33)
MCHC RBC AUTO-ENTMCNC: 34.6 G/DL (ref 31.5–36.5)
MCV RBC AUTO: 88 FL (ref 78–100)
MONOCYTES # BLD AUTO: 1 10E3/UL (ref 0–1.3)
MONOCYTES NFR BLD AUTO: 8 %
NEUTROPHILS # BLD AUTO: 9.5 10E3/UL (ref 1.6–8.3)
NEUTROPHILS NFR BLD AUTO: 75 %
NEUTS BAND NFR FLD MANUAL: 39 %
NRBC # BLD AUTO: 0 10E3/UL
NRBC BLD AUTO-RTO: 0 /100
NT-PROBNP SERPL-MCNC: 420 PG/ML (ref 0–900)
PH BODY FLUID SOURCE: NORMAL
PH FLD: 7 PH
PLATELET # BLD AUTO: 292 10E3/UL (ref 150–450)
POTASSIUM SERPL-SCNC: 3.1 MMOL/L (ref 3.4–5.3)
PROT SERPL-MCNC: 6.3 G/DL (ref 6.4–8.3)
RBC # BLD AUTO: 3.97 10E6/UL (ref 4.4–5.9)
RBC # FLD: 9053 /UL
SODIUM SERPL-SCNC: 128 MMOL/L (ref 136–145)
TROPONIN T SERPL HS-MCNC: 7 NG/L
WBC # BLD AUTO: 12.5 10E3/UL (ref 4–11)
WBC # FLD AUTO: 9580 /UL

## 2022-10-21 PROCEDURE — 71045 X-RAY EXAM CHEST 1 VIEW: CPT

## 2022-10-21 PROCEDURE — 32555 ASPIRATE PLEURA W/ IMAGING: CPT

## 2022-10-21 PROCEDURE — 83880 ASSAY OF NATRIURETIC PEPTIDE: CPT | Performed by: EMERGENCY MEDICINE

## 2022-10-21 PROCEDURE — 88112 CYTOPATH CELL ENHANCE TECH: CPT | Mod: TC | Performed by: EMERGENCY MEDICINE

## 2022-10-21 PROCEDURE — 82040 ASSAY OF SERUM ALBUMIN: CPT | Performed by: EMERGENCY MEDICINE

## 2022-10-21 PROCEDURE — 84484 ASSAY OF TROPONIN QUANT: CPT | Performed by: EMERGENCY MEDICINE

## 2022-10-21 PROCEDURE — 250N000013 HC RX MED GY IP 250 OP 250 PS 637: Performed by: EMERGENCY MEDICINE

## 2022-10-21 PROCEDURE — 83615 LACTATE (LD) (LDH) ENZYME: CPT | Performed by: EMERGENCY MEDICINE

## 2022-10-21 PROCEDURE — 85610 PROTHROMBIN TIME: CPT | Performed by: EMERGENCY MEDICINE

## 2022-10-21 PROCEDURE — 87185 SC STD ENZYME DETCJ PER NZM: CPT | Mod: 59 | Performed by: EMERGENCY MEDICINE

## 2022-10-21 PROCEDURE — 83986 ASSAY PH BODY FLUID NOS: CPT | Performed by: EMERGENCY MEDICINE

## 2022-10-21 PROCEDURE — 87070 CULTURE OTHR SPECIMN AEROBIC: CPT | Performed by: EMERGENCY MEDICINE

## 2022-10-21 PROCEDURE — 85014 HEMATOCRIT: CPT | Performed by: EMERGENCY MEDICINE

## 2022-10-21 PROCEDURE — 80053 COMPREHEN METABOLIC PANEL: CPT | Performed by: EMERGENCY MEDICINE

## 2022-10-21 PROCEDURE — 99285 EMERGENCY DEPT VISIT HI MDM: CPT | Mod: 25

## 2022-10-21 PROCEDURE — 999N000065 XR CHEST PORT 1 VIEW

## 2022-10-21 PROCEDURE — 82465 ASSAY BLD/SERUM CHOLESTEROL: CPT | Performed by: EMERGENCY MEDICINE

## 2022-10-21 PROCEDURE — 87205 SMEAR GRAM STAIN: CPT | Performed by: EMERGENCY MEDICINE

## 2022-10-21 PROCEDURE — 32555 ASPIRATE PLEURA W/ IMAGING: CPT | Performed by: EMERGENCY MEDICINE

## 2022-10-21 PROCEDURE — 84157 ASSAY OF PROTEIN OTHER: CPT | Performed by: EMERGENCY MEDICINE

## 2022-10-21 PROCEDURE — 84478 ASSAY OF TRIGLYCERIDES: CPT | Performed by: EMERGENCY MEDICINE

## 2022-10-21 PROCEDURE — 82150 ASSAY OF AMYLASE: CPT | Performed by: EMERGENCY MEDICINE

## 2022-10-21 PROCEDURE — 87075 CULTR BACTERIA EXCEPT BLOOD: CPT | Performed by: EMERGENCY MEDICINE

## 2022-10-21 PROCEDURE — 82945 GLUCOSE OTHER FLUID: CPT | Performed by: EMERGENCY MEDICINE

## 2022-10-21 PROCEDURE — 99284 EMERGENCY DEPT VISIT MOD MDM: CPT | Mod: 25 | Performed by: EMERGENCY MEDICINE

## 2022-10-21 PROCEDURE — 36415 COLL VENOUS BLD VENIPUNCTURE: CPT | Performed by: EMERGENCY MEDICINE

## 2022-10-21 PROCEDURE — 32551 INSERTION OF CHEST TUBE: CPT

## 2022-10-21 PROCEDURE — 89051 BODY FLUID CELL COUNT: CPT | Performed by: EMERGENCY MEDICINE

## 2022-10-21 RX ORDER — POTASSIUM CHLORIDE 20MEQ/15ML
60 LIQUID (ML) ORAL ONCE
Status: COMPLETED | OUTPATIENT
Start: 2022-10-21 | End: 2022-10-21

## 2022-10-21 RX ADMIN — POTASSIUM CHLORIDE 60 MEQ: 20 SOLUTION ORAL at 21:20

## 2022-10-21 ASSESSMENT — ENCOUNTER SYMPTOMS
GASTROINTESTINAL NEGATIVE: 1
ENDOCRINE NEGATIVE: 1
CARDIOVASCULAR NEGATIVE: 1
NEUROLOGICAL NEGATIVE: 1
SHORTNESS OF BREATH: 1
CONSTITUTIONAL NEGATIVE: 1
MUSCULOSKELETAL NEGATIVE: 1
EYES NEGATIVE: 1

## 2022-10-21 ASSESSMENT — ACTIVITIES OF DAILY LIVING (ADL)
ADLS_ACUITY_SCORE: 35
ADLS_ACUITY_SCORE: 33
ADLS_ACUITY_SCORE: 35

## 2022-10-21 NOTE — ED PROVIDER NOTES
"  History     Chief Complaint   Patient presents with     Lung Cancer     Notes fluid on rt lung and was told by his provider to come to the ER to have the fld removed. Denies pain. Resps non labored. Notes feels \"a little\" shortness of breath. Sats 88% in triage. Notes sats 85% today at home.      HPI  Jaxson Ramirez is a 67 year old male who was to the emergency department complaining of shortness of breath.  Patient has a history of lung cancer on the right.  He has had right-sided pleural effusions and has required thoracentesis previously.  Patient states that he has become gradually more short of breath over the past few days.  He is not on home oxygen.  He is to have surgery at [facility] this coming week.  His surgeon encouraged him to come to the emergency department for a thoracentesis for his worsening shortness of breath.  Patient denies cough.  He denies headache.  He states is not currently having any pain.  He has had no nausea or vomiting.  He has had no change in his bowel or bladder habits.    Allergies:  Allergies   Allergen Reactions     Ace Inhibitors      Tramadol Hcl Diarrhea     Vomiting  Ultram         Problem List:    Patient Active Problem List    Diagnosis Date Noted     Suicidal ideation 10/14/2022     Priority: Medium     Malignant neoplasm of lung, unspecified laterality, unspecified part of lung (H) 08/04/2022     Priority: Medium     Lung mass 07/11/2022     Priority: Medium     Formatting of this note might be different from the original.  Added automatically from request for surgery [id number]       Hemoptysis 07/11/2022     Priority: Medium     Formatting of this note might be different from the original.  Added automatically from request for surgery [id number]       Positive colorectal cancer screening using Cologuard test 09/25/2020     Priority: Medium     Added automatically from request for surgery [id number]       Essential hypertension 10/21/2019     Priority: Medium     " Pulmonary emphysema, unspecified emphysema type (H) 10/21/2019     Priority: Medium     Tobacco abuse 10/21/2019     Priority: Medium     Hyperlipidemia, unspecified hyperlipidemia type 10/21/2019     Priority: Medium     Overweight (BMI 25.0-29.9) 04/10/2019     Priority: Medium     BPH associated with nocturia 10/09/2018     Priority: Medium     Alcohol consumption heavy 09/30/2015     Priority: Medium     Elevated fasting glucose 09/25/2013     Priority: Medium        Past Medical History:    Past Medical History:   Diagnosis Date     Cellulitis and abscess of oral soft tissues      COPD (chronic obstructive pulmonary disease) (H)      Hypertension        Past Surgical History:    Past Surgical History:   Procedure Laterality Date     cataract right       COLONOSCOPY N/A 11/11/2021    Procedure: colonoscopy  ;  Surgeon: Dustin Baires MD;  Location: [facility] OR     layngoscopy  2009    layngoscopy     UVULECTOMY         Family History:    Family History   Problem Relation Age of Onset     Hypertension Brother      Cancer Brother         renal     Liver Cancer Mother      Parkinsonism Father      Bladder Cancer Father         smoker       Social History:  Marital Status:   [2]  Social History     Tobacco Use     Smoking status: Every Day     Packs/day: 0.25     Types: Cigarettes     Smokeless tobacco: Never     Tobacco comments:     pt denied QP 12/4/19   Substance Use Topics     Alcohol use: Yes     Comment: 5 beer daily     Drug use: Yes     Types: Marijuana     Comment: daily        Medications:    albuterol (PROAIR HFA/PROVENTIL HFA/VENTOLIN HFA) 108 (90 Base) MCG/ACT inhaler  amLODIPine (NORVASC) 10 MG tablet  aspirin 81 MG EC tablet  fluticasone-vilanterol (BREO ELLIPTA) 100-25 MCG/INH inhaler  losartan (COZAAR) 100 MG tablet  metoprolol succinate ER (TOPROL XL) 200 MG 24 hr tablet  rosuvastatin (CRESTOR) 20 MG tablet          Review of Systems   Constitutional: Negative.    HENT: Negative.    Eyes:  Negative.    Respiratory: Positive for shortness of breath.    Cardiovascular: Negative.    Gastrointestinal: Negative.    Endocrine: Negative.    Genitourinary: Negative.    Musculoskeletal: Negative.    Neurological: Negative.    All other systems reviewed and they are found unremarkable    Physical Exam   BP: 125/76  Pulse: 95  Temp: 100.2  F (37.9  C)  Resp: 18  SpO2: (!) 88 %      Physical Exam 67-year-old gentleman who is awake alert oriented person place and time.  Very pleasant and cooperative.  HET normocephalic extraocular muscles intact pupils equally round and reactive light and oropharynx is clear.  Neck is supple is full range of motion without pain.  Pulmonary exam patient is diminished breath sounds bilaterally.  No wheezes or rhonchi.  Heart maintains a regular rate and rhythm.  S1 and S2 sounds are appreciated.  Abdomen is soft and nontender.  Extremities a full range of motion 5 5 strength neurologic exam no focal deficit dermatologic exam no diffuse skin rashes or lesions noted.    ED Course                 [facility]    Thoracentesis    Date/Time: 10/21/2022 8:13 PM  Performed by: Jovanny Lenó MD  Authorized by: Jovanny León MD     Risks, benefits and alternatives discussed.    ANESTHESIA (see MAR for exact dosages):     Anesthesia method:  Local infiltration    Local anesthetic:  Lidocaine 1% w/o epi    PROCEDURE DETAILS      Patient position:  Sitting    Location:  R midscapular line    Intercostal space:  7th    Puncture method:  Through-the-needle catheter    Ultrasound guidance: yes      Indwelling catheter placed: yes      Needle gauge:  18    Catheter size:  18 G    Number of attempts:  1    Drainage characteristics:  Cloudy    POST PROCEDURE DETAILS     Patient tolerance of procedure:  Patient tolerated the procedure well with no immediate complications      PROCEDURE    Patient Tolerance:  Patient tolerated the procedure well with no immediate complications   400ml of  fluid removed           Discussed case with and turned over care to Dr. León at 1900.           Results for orders placed or performed during the hospital encounter of 10/21/22 (from the past 24 hour(s))   CBC with platelets differential    Narrative    The following orders were created for panel order CBC with platelets differential.  Procedure                               Abnormality         Status                     ---------                               -----------         ------                     CBC with platelets and d...[[id number]]  Abnormal            Final result                 Please view results for these tests on the individual orders.   Comprehensive metabolic panel   Result Value Ref Range    Sodium 128 (L) 136 - 145 mmol/L    Potassium 3.1 (L) 3.4 - 5.3 mmol/L    Chloride 88 (L) 98 - 107 mmol/L    Carbon Dioxide (CO2) 31 (H) 22 - 29 mmol/L    Anion Gap 9 7 - 15 mmol/L    Urea Nitrogen 6.8 (L) 8.0 - 23.0 mg/dL    Creatinine 0.37 (L) 0.67 - 1.17 mg/dL    Calcium 8.1 (L) 8.8 - 10.2 mg/dL    Glucose 107 (H) 70 - 99 mg/dL    Alkaline Phosphatase 98 40 - 129 U/L    AST 36 10 - 50 U/L    ALT 35 10 - 50 U/L    Protein Total 6.3 (L) 6.4 - 8.3 g/dL    Albumin 2.6 (L) 3.5 - 5.2 g/dL    Bilirubin Total 0.4 <=1.2 mg/dL    GFR Estimate >90 >60 mL/min/1.73m2   Troponin T, High Sensitivity   Result Value Ref Range    Troponin T, High Sensitivity 7 <=22 ng/L   Nt probnp inpatient (BNP)   Result Value Ref Range    N terminal Pro BNP Inpatient 420 0 - 900 pg/mL   INR   Result Value Ref Range    INR 1.12 0.85 - 1.15   CBC with platelets and differential   Result Value Ref Range    WBC Count 12.5 (H) 4.0 - 11.0 10e3/uL    RBC Count 3.97 (L) 4.40 - 5.90 10e6/uL    Hemoglobin 12.1 (L) 13.3 - 17.7 g/dL    Hematocrit 35.0 (L) 40.0 - 53.0 %    MCV 88 78 - 100 fL    MCH 30.5 26.5 - 33.0 pg    MCHC 34.6 31.5 - 36.5 g/dL    RDW 13.3 10.0 - 15.0 %    Platelet Count 292 150 - 450 10e3/uL    % Neutrophils 75 %    %  Lymphocytes 16 %    % Monocytes 8 %    % Eosinophils 0 %    % Basophils 0 %    % Immature Granulocytes 1 %    NRBCs per 100 WBC 0 <1 /100    Absolute Neutrophils 9.5 (H) 1.6 - 8.3 10e3/uL    Absolute Lymphocytes 2.0 0.8 - 5.3 10e3/uL    Absolute Monocytes 1.0 0.0 - 1.3 10e3/uL    Absolute Eosinophils 0.0 0.0 - 0.7 10e3/uL    Absolute Basophils 0.0 0.0 - 0.2 10e3/uL    Absolute Immature Granulocytes 0.1 <=0.4 10e3/uL    Absolute NRBCs 0.0 10e3/uL   Extra Tube    Narrative    The following orders were created for panel order Extra Tube.  Procedure                               Abnormality         Status                     ---------                               -----------         ------                     Extra Red Top Tube[[id number]]                               Final result               Extra Green Top (Lithium...[[id number]]                      Final result                 Please view results for these tests on the individual orders.   Extra Red Top Tube   Result Value Ref Range    Hold Specimen JIC    Extra Green Top (Lithium Heparin) ON ICE   Result Value Ref Range    Hold Specimen JIC    XR Chest Port 1 View    Narrative    PROCEDURE:  XR CHEST PORT 1 VIEW    HISTORY:  dyspnea.     COMPARISON:  10/14/2022    FINDINGS:   The cardiac silhouette is normal in size. There is a large right-sided  pleural effusion improved from October 14, 2022. There is compressive  atelectasis in the right lung      Impression    IMPRESSION:  Mild interval improvement in the right-sided pleural  effusion from October 14, 2022      ELANA ALARCON MD         SYSTEM ID:  [id number]       Medications - No data to display    Assessments & Plan (with Medical Decision Making)     I have reviewed the nursing notes.    I have reviewed the findings, diagnosis, plan and need for follow up with the patient.      New Prescriptions    No medications on file       Final diagnoses:   Pleural effusion       10/21/2022   [facility]      Kota Avila,   10/21/22 1915       Jovanny León MD  10/21/22 2014

## 2022-10-22 LAB
AMYLASE BODY FLUID SOURCE: NORMAL
AMYLASE FLD-CCNC: 34 U/L
CHOLEST FLD-MCNC: 59 MG/DL
CHOLESTEROL BODY FLUID SOURCE: NORMAL
GLUCOSE BODY FLUID SOURCE: NORMAL
GLUCOSE FLD-MCNC: 55 MG/DL
LD BODY BODY FLUID SOURCE: NORMAL
LDH FLD L TO P-CCNC: >2500 U/L
PROT FLD-MCNC: 3.8 G/DL
PROTEIN BODY FLUID SOURCE: NORMAL
TRIGL FLD-MCNC: 39 MG/DL
TRIGLYCERIDE BODY FLUID SOURCE: NORMAL

## 2022-10-22 NOTE — ED NOTES
Patient presents after getting chest Xray today, per patient, surgeon told him to go to the ED to have the fluid drained from his lungs and to test for cytology results and that if they were unable to get his fluid tested that his surgery may have to be pushed. Patient reports some SOB, better when moving around. Denies any pain at this time.

## 2022-10-22 NOTE — ED NOTES
Patient is discharging to home given information on use of augmentin and to f/u with surg/onc. Patient stated understanding of these instructions and is discharging by private auto.  Patient denies pain and sob following thoracentesis procedure.  Patient is in care of wife and ambulated independently.

## 2022-10-22 NOTE — ED NOTES
Face to face report given with opportunity to observe patient.    Report given to ISMAEL Kumar RN   10/21/2022  7:12 PM

## 2022-10-24 LAB
PATH REPORT.FINAL DX SPEC: NORMAL
PATH REPORT.FINAL DX SPEC: NORMAL
PATH REPORT.GROSS SPEC: NORMAL
PATH REPORT.GROSS SPEC: NORMAL
PATH REPORT.MICROSCOPIC SPEC OTHER STN: NORMAL
PATH REPORT.MICROSCOPIC SPEC OTHER STN: NORMAL
PATH REPORT.RELEVANT HX SPEC: NORMAL
PATH REPORT.RELEVANT HX SPEC: NORMAL

## 2022-10-24 PROCEDURE — 88112 CYTOPATH CELL ENHANCE TECH: CPT | Mod: 26 | Performed by: PATHOLOGY

## 2022-10-24 PROCEDURE — 88305 TISSUE EXAM BY PATHOLOGIST: CPT | Mod: 26 | Performed by: PATHOLOGY

## 2022-10-25 ENCOUNTER — TRANSFERRED RECORDS (OUTPATIENT)
Dept: HEALTH INFORMATION MANAGEMENT | Facility: CLINIC | Age: 67
End: 2022-10-25

## 2022-10-27 LAB
BACTERIA PLR CULT: ABNORMAL
GRAM STAIN RESULT: ABNORMAL
GRAM STAIN RESULT: ABNORMAL

## 2022-10-30 LAB
BACTERIA PLR CULT: ABNORMAL
BETA LACTAMASE TEST: POSITIVE

## 2022-11-06 DIAGNOSIS — J43.9 PULMONARY EMPHYSEMA, UNSPECIFIED EMPHYSEMA TYPE (H): ICD-10-CM

## 2022-11-07 ENCOUNTER — LAB REQUISITION (OUTPATIENT)
Dept: LAB | Facility: HOSPITAL | Age: 67
End: 2022-11-07
Payer: COMMERCIAL

## 2022-11-07 DIAGNOSIS — C34.31 MALIGNANT NEOPLASM OF LOWER LOBE, RIGHT BRONCHUS OR LUNG (H): ICD-10-CM

## 2022-11-07 DIAGNOSIS — J86.9 PYOTHORAX WITHOUT FISTULA (H): ICD-10-CM

## 2022-11-07 LAB
ALP SERPL-CCNC: 71 U/L (ref 40–129)
AST SERPL W P-5'-P-CCNC: 21 U/L (ref 10–50)
BASOPHILS # BLD AUTO: 0 10E3/UL (ref 0–0.2)
BASOPHILS NFR BLD AUTO: 0 %
CREAT SERPL-MCNC: 0.5 MG/DL (ref 0.67–1.17)
CRP SERPL-MCNC: 56.66 MG/L
EOSINOPHIL # BLD AUTO: 0 10E3/UL (ref 0–0.7)
EOSINOPHIL NFR BLD AUTO: 0 %
ERYTHROCYTE [DISTWIDTH] IN BLOOD BY AUTOMATED COUNT: 14.7 % (ref 10–15)
GFR SERPL CREATININE-BSD FRML MDRD: >90 ML/MIN/1.73M2
HCT VFR BLD AUTO: 30.2 % (ref 40–53)
HGB BLD-MCNC: 10 G/DL (ref 13.3–17.7)
IMM GRANULOCYTES # BLD: 0.1 10E3/UL
IMM GRANULOCYTES NFR BLD: 1 %
LYMPHOCYTES # BLD AUTO: 1.6 10E3/UL (ref 0.8–5.3)
LYMPHOCYTES NFR BLD AUTO: 15 %
MCH RBC QN AUTO: 29.6 PG (ref 26.5–33)
MCHC RBC AUTO-ENTMCNC: 33.1 G/DL (ref 31.5–36.5)
MCV RBC AUTO: 89 FL (ref 78–100)
MONOCYTES # BLD AUTO: 1 10E3/UL (ref 0–1.3)
MONOCYTES NFR BLD AUTO: 10 %
NEUTROPHILS # BLD AUTO: 7.6 10E3/UL (ref 1.6–8.3)
NEUTROPHILS NFR BLD AUTO: 74 %
NRBC # BLD AUTO: 0 10E3/UL
NRBC BLD AUTO-RTO: 0 /100
PLATELET # BLD AUTO: 378 10E3/UL (ref 150–450)
RBC # BLD AUTO: 3.38 10E6/UL (ref 4.4–5.9)
WBC # BLD AUTO: 10.3 10E3/UL (ref 4–11)

## 2022-11-07 PROCEDURE — 84450 TRANSFERASE (AST) (SGOT): CPT | Performed by: INTERNAL MEDICINE

## 2022-11-07 PROCEDURE — 84075 ASSAY ALKALINE PHOSPHATASE: CPT | Performed by: INTERNAL MEDICINE

## 2022-11-07 PROCEDURE — 82565 ASSAY OF CREATININE: CPT | Performed by: INTERNAL MEDICINE

## 2022-11-07 PROCEDURE — 86140 C-REACTIVE PROTEIN: CPT | Performed by: INTERNAL MEDICINE

## 2022-11-07 PROCEDURE — 85025 COMPLETE CBC W/AUTO DIFF WBC: CPT | Performed by: INTERNAL MEDICINE

## 2022-11-07 RX ORDER — FLUTICASONE FUROATE AND VILANTEROL 100; 25 UG/1; UG/1
POWDER RESPIRATORY (INHALATION)
Qty: 60 EACH | Refills: 3 | Status: SHIPPED | OUTPATIENT
Start: 2022-11-07 | End: 2023-04-11

## 2022-11-07 NOTE — TELEPHONE ENCOUNTER
Fluticasone-vilanterol      Last Written Prescription Date:  8/31/22  Last Fill Quantity: 60,   # refills: 1  Last Office Visit: 10/14/22  Future Office visit:    Next 5 appointments (look out 90 days)    Nov 08, 2022 10:40 AM  (Arrive by 10:25 AM)  Office Visit with Anthony Ramirez DO  [facility] [place] ([facility] [place] ) [address]  [place] [address]  [phone]           Routing refill request to provider for review/approval because:

## 2022-11-08 ENCOUNTER — OFFICE VISIT (OUTPATIENT)
Dept: FAMILY MEDICINE | Facility: OTHER | Age: 67
End: 2022-11-08
Attending: FAMILY MEDICINE
Payer: COMMERCIAL

## 2022-11-08 VITALS
TEMPERATURE: 98.2 F | WEIGHT: 155 LBS | HEART RATE: 70 BPM | RESPIRATION RATE: 18 BRPM | DIASTOLIC BLOOD PRESSURE: 58 MMHG | BODY MASS INDEX: 24.28 KG/M2 | OXYGEN SATURATION: 95 % | SYSTOLIC BLOOD PRESSURE: 102 MMHG

## 2022-11-08 DIAGNOSIS — Z09 HOSPITAL DISCHARGE FOLLOW-UP: Primary | ICD-10-CM

## 2022-11-08 DIAGNOSIS — J86.9 EMPYEMA LUNG (H): ICD-10-CM

## 2022-11-08 DIAGNOSIS — C34.91 NON-SMALL CELL CANCER OF RIGHT LUNG (H): ICD-10-CM

## 2022-11-08 DIAGNOSIS — I48.0 PAF (PAROXYSMAL ATRIAL FIBRILLATION) (H): ICD-10-CM

## 2022-11-08 PROCEDURE — G0463 HOSPITAL OUTPT CLINIC VISIT: HCPCS

## 2022-11-08 PROCEDURE — G0463 HOSPITAL OUTPT CLINIC VISIT: HCPCS | Mod: 25

## 2022-11-08 PROCEDURE — 99495 TRANSJ CARE MGMT MOD F2F 14D: CPT | Performed by: FAMILY MEDICINE

## 2022-11-08 PROCEDURE — 99212 OFFICE O/P EST SF 10 MIN: CPT | Performed by: FAMILY MEDICINE

## 2022-11-08 RX ORDER — AMIODARONE HYDROCHLORIDE 200 MG/1
TABLET ORAL
COMMUNITY
Start: 2022-11-02 | End: 2023-09-06

## 2022-11-08 RX ORDER — MAGNESIUM OXIDE 400 MG/1
400 TABLET ORAL
COMMUNITY
Start: 2022-11-02

## 2022-11-08 RX ORDER — ACETAMINOPHEN 500 MG
500-1000 TABLET ORAL
COMMUNITY
Start: 2022-11-02

## 2022-11-08 RX ORDER — AMLODIPINE BESYLATE 10 MG/1
5 TABLET ORAL
COMMUNITY
Start: 2022-11-02 | End: 2024-08-12

## 2022-11-08 RX ORDER — METRONIDAZOLE 500 MG/1
500 TABLET ORAL
COMMUNITY
Start: 2022-11-01 | End: 2022-11-22

## 2022-11-08 RX ORDER — CEFTRIAXONE SODIUM 2 G
2 VIAL (EA) INJECTION EVERY 24 HOURS
COMMUNITY
End: 2023-09-06

## 2022-11-08 ASSESSMENT — ENCOUNTER SYMPTOMS
FEVER: 0
LIGHT-HEADEDNESS: 0

## 2022-11-08 NOTE — PROGRESS NOTES
Assessment & Plan     Hospital discharge follow-up  Empyema lung (H)  Non-small cell cancer of right lung (H)  PAF (paroxysmal atrial fibrillation) (H)    Improving, nothing needing to be addressed today, has specialist follow-up next week, had labs drawn yesterday.      MOIRA DEL RIO, DO  [facility] [place]    Subjective   Jaxson is a 67 year old accompanied by his spouse, presenting for the following health issues:  Hospital F/U      HPI     Jaxson is a 67-year-old male whom I am seeing today for the first time, accompanied today by his wife.  He is being seen as a hospital discharge follow-up.  He was admitted to [facility] from 10/25/2022 through 11/2/2022.  He was admitted for VATS surgery for a non small cell carcinoma RLL mass.  Upon opening, they discovered an empyema so lobectomy was not performed.  Post-op course complicated by pain requiring an epidural, SVT, and PAF.  He was started on amiodarone and metoprolol.  Also acute hypoxic resp failure due to pleural effusion in setting of moderate-to-severe COPD.  Empyema cultures revealed Prevotella melaninogenica.  Released on Flagyl and home Rocephin pushes which his wife if doing daily.      Has home nursing once per week currently.  Feels breathing improving daily.  More energy.  No pain concerns.  No fever, no chills.  No lightheadedness, no bleeding.  He is on Eliquis.  Also Holter monitor already arranged.    No concerns for me to address today.  See his chest surgeon and ID physician next week Wednesday.      Hospital Follow-up Visit:    Hospital/Nursing Home/IP Rehab Facility: [facility]  Date of Admission: 10/25/2022  Date of Discharge: 11/02/2022  Reason(s) for Admission: Paroxysmal Atrial Fib    Was your hospitalization related to COVID-19? No   Problems taking medications regularly:  None  Medication changes since discharge: Rocephin, Flagyl, Magnesium, Eliquis, Norvasc, Amiodarone, Tylenol  Problems adhering to non-medication  therapy:  None    Summary of hospitalization:  [facility] discharge summary reviewed  Diagnostic Tests/Treatments reviewed.  Follow up needed: none  Other Healthcare Providers Involved in Patient s Care:         CT surgery, ID  Update since discharge: improved.         Plan of care communicated with patient       Review of Systems   Constitutional: Negative for fever.   Cardiovascular: Negative for peripheral edema.   Neurological: Negative for light-headedness.            Objective    /58   Pulse 70   Temp 98.2  F (36.8  C) (Tympanic)   Resp 18   Wt 70.3 kg (155 lb)   SpO2 95%   BMI 24.28 kg/m    Body mass index is 24.28 kg/m .  Physical Exam  Constitutional:       General: He is not in acute distress.     Appearance: Normal appearance.   Cardiovascular:      Rate and Rhythm: Normal rate and regular rhythm.      Heart sounds: Normal heart sounds. No murmur heard.  Pulmonary:      Comments: Diminished bs on right, more so at base.  Left clear.  Skin:     Comments: Incisions c/d/i with fresh dressing (right posterolateral chest wall)   Neurological:      Mental Status: He is alert and oriented to person, place, and time.

## 2022-11-08 NOTE — NURSING NOTE
"Chief Complaint   Patient presents with     Hospital F/U       Initial /58   Pulse 70   Temp 98.2  F (36.8  C) (Tympanic)   Resp 18   Wt 70.3 kg (155 lb)   SpO2 95%   BMI 24.28 kg/m   Estimated body mass index is 24.28 kg/m  as calculated from the following:    Height as of 4/12/22: 1.702 m (5' 7\").    Weight as of this encounter: 70.3 kg (155 lb).  Medication Reconciliation: complete  Nona Royal LPN  "

## 2022-11-14 ENCOUNTER — LAB REQUISITION (OUTPATIENT)
Dept: LAB | Facility: HOSPITAL | Age: 67
End: 2022-11-14
Payer: COMMERCIAL

## 2022-11-14 DIAGNOSIS — Z53.9 PERSONS ENCOUNTERING HEALTH SERVICES FOR SPECIFIC PROCEDURES, NOT CARRIED OUT: Primary | ICD-10-CM

## 2022-11-14 DIAGNOSIS — J86.9 PYOTHORAX WITHOUT FISTULA (H): ICD-10-CM

## 2022-11-14 DIAGNOSIS — C34.31 MALIGNANT NEOPLASM OF LOWER LOBE, RIGHT BRONCHUS OR LUNG (H): ICD-10-CM

## 2022-11-14 LAB
ALP SERPL-CCNC: 65 U/L (ref 40–129)
AST SERPL W P-5'-P-CCNC: 15 U/L (ref 10–50)
BASOPHILS # BLD AUTO: 0 10E3/UL (ref 0–0.2)
BASOPHILS NFR BLD AUTO: 0 %
CREAT SERPL-MCNC: 0.5 MG/DL (ref 0.67–1.17)
CRP SERPL-MCNC: 30.83 MG/L
EOSINOPHIL # BLD AUTO: 0 10E3/UL (ref 0–0.7)
EOSINOPHIL NFR BLD AUTO: 0 %
ERYTHROCYTE [DISTWIDTH] IN BLOOD BY AUTOMATED COUNT: 16.5 % (ref 10–15)
GFR SERPL CREATININE-BSD FRML MDRD: >90 ML/MIN/1.73M2
HCT VFR BLD AUTO: 32.9 % (ref 40–53)
HGB BLD-MCNC: 10.7 G/DL (ref 13.3–17.7)
IMM GRANULOCYTES # BLD: 0 10E3/UL
IMM GRANULOCYTES NFR BLD: 0 %
LYMPHOCYTES # BLD AUTO: 1.6 10E3/UL (ref 0.8–5.3)
LYMPHOCYTES NFR BLD AUTO: 23 %
MCH RBC QN AUTO: 29.4 PG (ref 26.5–33)
MCHC RBC AUTO-ENTMCNC: 32.5 G/DL (ref 31.5–36.5)
MCV RBC AUTO: 90 FL (ref 78–100)
MONOCYTES # BLD AUTO: 0.9 10E3/UL (ref 0–1.3)
MONOCYTES NFR BLD AUTO: 14 %
NEUTROPHILS # BLD AUTO: 4.3 10E3/UL (ref 1.6–8.3)
NEUTROPHILS NFR BLD AUTO: 63 %
NRBC # BLD AUTO: 0 10E3/UL
NRBC BLD AUTO-RTO: 0 /100
PLATELET # BLD AUTO: 378 10E3/UL (ref 150–450)
RBC # BLD AUTO: 3.64 10E6/UL (ref 4.4–5.9)
WBC # BLD AUTO: 6.8 10E3/UL (ref 4–11)

## 2022-11-14 PROCEDURE — 84075 ASSAY ALKALINE PHOSPHATASE: CPT | Performed by: INTERNAL MEDICINE

## 2022-11-14 PROCEDURE — 84450 TRANSFERASE (AST) (SGOT): CPT | Performed by: INTERNAL MEDICINE

## 2022-11-14 PROCEDURE — 85025 COMPLETE CBC W/AUTO DIFF WBC: CPT | Performed by: INTERNAL MEDICINE

## 2022-11-14 PROCEDURE — 86140 C-REACTIVE PROTEIN: CPT | Performed by: INTERNAL MEDICINE

## 2022-11-14 PROCEDURE — 82565 ASSAY OF CREATININE: CPT | Performed by: INTERNAL MEDICINE

## 2022-11-21 ENCOUNTER — LAB REQUISITION (OUTPATIENT)
Dept: LAB | Facility: HOSPITAL | Age: 67
End: 2022-11-21
Payer: COMMERCIAL

## 2022-11-21 DIAGNOSIS — J86.9 PYOTHORAX WITHOUT FISTULA (H): ICD-10-CM

## 2022-11-21 DIAGNOSIS — C34.31 MALIGNANT NEOPLASM OF LOWER LOBE, RIGHT BRONCHUS OR LUNG (H): ICD-10-CM

## 2022-11-21 LAB
ALP SERPL-CCNC: 64 U/L (ref 40–129)
AST SERPL W P-5'-P-CCNC: 16 U/L (ref 10–50)
BASOPHILS # BLD AUTO: 0 10E3/UL (ref 0–0.2)
BASOPHILS NFR BLD AUTO: 0 %
CREAT SERPL-MCNC: 0.54 MG/DL (ref 0.67–1.17)
CRP SERPL-MCNC: 19.35 MG/L
EOSINOPHIL # BLD AUTO: 0 10E3/UL (ref 0–0.7)
EOSINOPHIL NFR BLD AUTO: 1 %
ERYTHROCYTE [DISTWIDTH] IN BLOOD BY AUTOMATED COUNT: 17.5 % (ref 10–15)
GFR SERPL CREATININE-BSD FRML MDRD: >90 ML/MIN/1.73M2
HCT VFR BLD AUTO: 32.8 % (ref 40–53)
HGB BLD-MCNC: 10.7 G/DL (ref 13.3–17.7)
IMM GRANULOCYTES # BLD: 0 10E3/UL
IMM GRANULOCYTES NFR BLD: 0 %
LYMPHOCYTES # BLD AUTO: 1.5 10E3/UL (ref 0.8–5.3)
LYMPHOCYTES NFR BLD AUTO: 23 %
MCH RBC QN AUTO: 29.7 PG (ref 26.5–33)
MCHC RBC AUTO-ENTMCNC: 32.6 G/DL (ref 31.5–36.5)
MCV RBC AUTO: 91 FL (ref 78–100)
MONOCYTES # BLD AUTO: 1 10E3/UL (ref 0–1.3)
MONOCYTES NFR BLD AUTO: 16 %
NEUTROPHILS # BLD AUTO: 3.9 10E3/UL (ref 1.6–8.3)
NEUTROPHILS NFR BLD AUTO: 60 %
NRBC # BLD AUTO: 0 10E3/UL
NRBC BLD AUTO-RTO: 0 /100
PLATELET # BLD AUTO: 267 10E3/UL (ref 150–450)
RBC # BLD AUTO: 3.6 10E6/UL (ref 4.4–5.9)
WBC # BLD AUTO: 6.4 10E3/UL (ref 4–11)

## 2022-11-21 PROCEDURE — 82565 ASSAY OF CREATININE: CPT | Performed by: INTERNAL MEDICINE

## 2022-11-21 PROCEDURE — 84450 TRANSFERASE (AST) (SGOT): CPT | Performed by: INTERNAL MEDICINE

## 2022-11-21 PROCEDURE — 85025 COMPLETE CBC W/AUTO DIFF WBC: CPT | Performed by: INTERNAL MEDICINE

## 2022-11-21 PROCEDURE — 86140 C-REACTIVE PROTEIN: CPT | Performed by: INTERNAL MEDICINE

## 2022-11-21 PROCEDURE — 84075 ASSAY ALKALINE PHOSPHATASE: CPT | Performed by: INTERNAL MEDICINE

## 2022-12-10 LAB
ACID FAST STAIN (ARUP): NORMAL

## 2022-12-20 DIAGNOSIS — E78.5 HYPERLIPIDEMIA, UNSPECIFIED HYPERLIPIDEMIA TYPE: ICD-10-CM

## 2022-12-21 RX ORDER — ROSUVASTATIN CALCIUM 20 MG/1
TABLET, COATED ORAL
Qty: 90 TABLET | Refills: 0 | Status: SHIPPED | OUTPATIENT
Start: 2022-12-21 | End: 2023-03-23

## 2023-01-15 DIAGNOSIS — J43.9 PULMONARY EMPHYSEMA, UNSPECIFIED EMPHYSEMA TYPE (H): ICD-10-CM

## 2023-01-16 RX ORDER — ALBUTEROL SULFATE 90 UG/1
AEROSOL, METERED RESPIRATORY (INHALATION)
Qty: 18 G | Refills: 1 | Status: SHIPPED | OUTPATIENT
Start: 2023-01-16 | End: 2023-09-06

## 2023-03-21 DIAGNOSIS — E78.5 HYPERLIPIDEMIA, UNSPECIFIED HYPERLIPIDEMIA TYPE: ICD-10-CM

## 2023-03-22 NOTE — TELEPHONE ENCOUNTER
Rosuvastatin (Crestor) 20 MG tablet    Last Written Prescription Date:  12/21/2022  Last Fill Quantity: 90,   # refills: 0  Last Office Visit: 10/14/2022

## 2023-03-23 RX ORDER — ROSUVASTATIN CALCIUM 20 MG/1
TABLET, COATED ORAL
Qty: 90 TABLET | Refills: 1 | Status: SHIPPED | OUTPATIENT
Start: 2023-03-23 | End: 2023-09-18

## 2023-04-09 DIAGNOSIS — J43.9 PULMONARY EMPHYSEMA, UNSPECIFIED EMPHYSEMA TYPE (H): ICD-10-CM

## 2023-04-10 NOTE — TELEPHONE ENCOUNTER
fluticasone-vilanterol (BREO ELLIPTA) 100-25 MCG/ACT inhaler  Last Written Prescription Date:  11/7/2022  Last Fill Quantity: 60,   # refills: 3  Last Office Visit: 11/8/2022  Future Office visit:

## 2023-04-11 RX ORDER — FLUTICASONE FUROATE AND VILANTEROL TRIFENATATE 100; 25 UG/1; UG/1
POWDER RESPIRATORY (INHALATION)
Qty: 60 EACH | Refills: 5 | Status: SHIPPED | OUTPATIENT
Start: 2023-04-11 | End: 2024-08-12

## 2023-09-05 DIAGNOSIS — J43.9 PULMONARY EMPHYSEMA, UNSPECIFIED EMPHYSEMA TYPE (H): ICD-10-CM

## 2023-09-05 NOTE — PROGRESS NOTES
[facility]    RADIATION ONCOLOGY CONSULTATION      Jaxson Ramirez  is referred by Dr. Fuentes for an oncology consultation.    PRIMARY PHYSICIAN: Annette William     Cancer Staging   No matching staging information was found for the patient.    IMPRESSION:This is a 68 year old man with a history of prior right and middle lobectomy for NSCLC who now has biopsy proven recurrent right lung disease. We spoke with him at length regarding the indications for, logistics, benefits, risks and side effects (both short and long term) of a course of radiotherapy. The results of his most recent PET CT were reviewed with him and he understands that given the size and location of intrathoracic disease, an SBRT approach would not be appropriate and thus a more standard approach of treatment over ~ 6 weeks with concurrent systemic therapy is recommended. After full discussion, the patient verbalized understanding and acceptance of the aforementioned and a desire to move forward with treatment.    PLAN: Complete CT simulation for radiotherapy planning as soon as possible. Treatment will be 60-66 Gray delivered in 2 Flores fractions using a VMAT approach.   ______________________________________________________________________  HISTORY OF PRESENT ILLNESS: Jaxson is a 68-year-old male patient who presents for radiation therapy consultation with a right lower lobe lung mass.  Patient does postoperative from a bilobectomy of the right lower and middle lobes completed in November 2022.  History as below.    8/9/2022 PET/CT skull base to midthigh: There is a right lower lobe lung mass with partially necrotic appearance with nodular areas of abnormal uptake consistent with known primary lung cancer.  There is a small mediastinal lymph node with mild focal uptake concerning for metastatic disease.    11/29/2022 Lung, right lower and middle lobes, bilobectomy: Poorly differentiated non-small cell carcinoma.  Lymph node 10 R, 4R, 7, 8,  excision 0/6 lymph nodes negative for malignancy.    4/14/2023 CT chest with contrast: Stable postoperative changes of partial right lung resection.  No new chest mass or lymphadenopathy.    7/17/2023 CT chest with contrast: There is a 3.1 cm nodularity at the very right lung base.    8/7/2023 right lung needle biopsy: Malignancy identified, consistent with carcinoma    8/15/2023 medical oncology visit: Pending results of PET scan, plan for definitive intent concurrent chemotherapy and radiation or should there be distant disease plan would be palliative intent chemotherapy.  We will hold pembrolizumab    8/30/2023 PET/CT: Metabolically active right pleural nodularity compatible with metastatic disease.  There is a small focus of activity in the posterior inferior margin of the left ear.    Scheduling Conflicts: no  Systemic Therapy: pending   Port: left   Pacemaker: no  Hx of autoimmune disorders: no  Previous Radiation Therapy: no    Past Medical History:   Diagnosis Date    Alcohol consumption heavy 09/30/2015    BPH associated with nocturia 10/09/2018    Cellulitis and abscess of face 08/12/2015    Right lower cheek    Cellulitis and abscess of oral soft tissues     COPD (chronic obstructive pulmonary disease) (H)     COPD (chronic obstructive pulmonary disease) (H) 03/03/2011    Deviated nasal septum 12/02/1999    s/p surgery    Difficulty in swallowing 02/28/2009    Nathaniel's angina vs. angioedema.  Transnasal flexible laryngoscopy 2/23/2009 and 2/25/2009. [facility]- hospitalization    Dyspnea and respiratory abnormality 05/19/2005    Chronic snoring w/o apnea    Dysrhythmia     Esophageal reflux 06/08/2005    GERD    Essential Hypertension 07/13/2005    Hemoptysis 07/11/2022    Hyperlipidemia, unspecified 03/03/2011    Hypertrophic and atrophic condition of skin 07/13/2005    Right eyelid and neck; treated with lequid nitrogen    Lung cancer (H) 01/25/2023    Lung mass 07/11/2022    Malignant neoplasm of lower  lobe, right bronchus or lung (H) 2022    Overweight (BMI 25.0-29.9) 04/10/2019    Subcutaneous emphysema (H) 2022    Tobacco use disorder 2008       Past Surgical History:   Procedure Laterality Date    AS REPAIR OF NASAL SEPTUM  1999    Septoplasty    BRONCHOSCOPY  2022    BRONCHOSCOPY  2022    BRONCHOSCOPY  2022    cataract right      COLONOSCOPY N/A 2021    Procedure: colonoscopy  ;  Surgeon: Dustin Baires MD;  Location: [facility] OR    COLONOSCOPY  10/13/2011    1 tubular adenoma, sessile 0.5 cm polyp at 20 cm    EGD  10/13/2011    Demonstrates moderately severe to sever bile reflux gastritis    EXC BENIG .1-.5cm Trunk, ARM, LE  1997    Removed ruptured and inflamed epidermal inclusion cyst from left inner buttock    layngoscopy      layngoscopy    LOBECTOMY Right 2022    right lower    PORTACATH PLACEMENT  2023    Insertion PORT A CATH left internal jugular    THORACOSCOPY  2022    Thoracotomy surgery for right lower lobectomy, mediastinal lymph node sampling;    THORACOTOMY  10/25/2022    Right VATS, decortication, rigt pleural biopsy, bronchoscopy    THORACOTOMY  2022    Redo right thoracotomy and chest washout    Ultrasound endobonchial   2022    With fine needle aspiration    UVULECTOMY  1999       Family History   Problem Relation Age of Onset    Liver Cancer Mother          at age 49    Parkinsonism Father     Bladder Cancer Father         smoker    Hypertension Brother     Cancer Brother         renal       Social History     Tobacco Use    Smoking status: Every Day     Packs/day: 0.50     Years: 45.00     Pack years: 22.50     Types: Cigarettes    Smokeless tobacco: Never    Tobacco comments:     Last attempted to quit 2022   Substance Use Topics    Alcohol use: Yes     Comment: 5 beer daily         CURRENT MEDICATIONS:   Current Outpatient Medications   Medication    amLODIPine (NORVASC) 10  "MG tablet    aspirin 81 MG EC tablet    BREO ELLIPTA 100-25 MCG/ACT inhaler    folic acid (FOLVITE) 1 MG tablet    magnesium oxide (MAG-OX) 400 MG tablet    metoprolol tartrate (LOPRESSOR) 25 MG tablet    rosuvastatin (CRESTOR) 20 MG tablet    acetaminophen (TYLENOL) 325 MG tablet    albuterol (PROAIR HFA/PROVENTIL HFA/VENTOLIN HFA) 108 (90 Base) MCG/ACT inhaler    prochlorperazine (COMPAZINE) 10 MG tablet     No current facility-administered medications for this visit.         ALLERGIES:  Allergies   Allergen Reactions    Ace Inhibitors     Tramadol Hcl Diarrhea     Vomiting  Ultram         Review of Systems   Constitutional:  Positive for malaise/fatigue. Negative for weight loss.   Respiratory:  Positive for cough and shortness of breath.    Cardiovascular:  Negative for chest pain.   Gastrointestinal:  Negative for abdominal pain, constipation, diarrhea, nausea and vomiting.   Psychiatric/Behavioral:  The patient has insomnia.      VITAL SIGNS:  /62 (BP Location: Right arm, Patient Position: Chair, Cuff Size: Adult Regular)   Pulse 99   Temp 98.1  F (36.7  C) (Tympanic)   Resp 23   Ht 1.651 m (5' 5\")   Wt 65.1 kg (143 lb 9.6 oz)   SpO2 99%   BMI 23.90 kg/m    Wt Readings from Last 4 Encounters:   09/06/23 65.1 kg (143 lb 9.6 oz)   11/08/22 70.3 kg (155 lb)   10/14/22 73 kg (161 lb)   04/12/22 70.9 kg (156 lb 6.4 oz)       PHYSICAL EXAM:  Constitutional: awake, alert, cooperative, no apparent distress, and appears stated age  Eyes: Lids and lashes normal, sclera clear, conjunctiva normal  ENT: Normocephalic, without obvious abnormality, atraumatic  Hematologic / Lymphatic: no cervical lymphadenopathy and no supraclavicular lymphadenopathy  Respiratory: Right base to middle lobe diminished to auscultation, bilateral upper lobes clear to auscultation   Cardiovascular: Regular rate and rhythm, normal S1 and S2  GI: Normal bowel sounds, soft, non-distended, non-tender  Skin: no redness, warmth, or " swelling  Musculoskeletal: tone is normal  Neurologic: Awake, alert, oriented to name, place and time. Gait is normal.  Neuropsychiatric: General: normal, calm, and normal eye contact        JAYA Mohr CNP, MD  Radiation Oncologist    I saw this patient while providing locum tenens coverage.

## 2023-09-06 ENCOUNTER — ONCOLOGY VISIT (OUTPATIENT)
Dept: RADIATION ONCOLOGY | Facility: HOSPITAL | Age: 68
End: 2023-09-06
Attending: STUDENT IN AN ORGANIZED HEALTH CARE EDUCATION/TRAINING PROGRAM
Payer: COMMERCIAL

## 2023-09-06 VITALS
BODY MASS INDEX: 23.93 KG/M2 | WEIGHT: 143.6 LBS | SYSTOLIC BLOOD PRESSURE: 118 MMHG | TEMPERATURE: 98.1 F | RESPIRATION RATE: 23 BRPM | HEIGHT: 65 IN | DIASTOLIC BLOOD PRESSURE: 62 MMHG | OXYGEN SATURATION: 99 % | HEART RATE: 99 BPM

## 2023-09-06 DIAGNOSIS — C34.90 MALIGNANT NEOPLASM OF LUNG, UNSPECIFIED LATERALITY, UNSPECIFIED PART OF LUNG (H): Primary | ICD-10-CM

## 2023-09-06 PROCEDURE — 99205 OFFICE O/P NEW HI 60 MIN: CPT | Performed by: STUDENT IN AN ORGANIZED HEALTH CARE EDUCATION/TRAINING PROGRAM

## 2023-09-06 PROCEDURE — G0463 HOSPITAL OUTPT CLINIC VISIT: HCPCS

## 2023-09-06 RX ORDER — METOPROLOL TARTRATE 50 MG
3 TABLET ORAL 2 TIMES DAILY
COMMUNITY
Start: 2023-07-16

## 2023-09-06 RX ORDER — FOLIC ACID 1 MG/1
1 TABLET ORAL DAILY
COMMUNITY
Start: 2023-07-20 | End: 2024-08-12

## 2023-09-06 RX ORDER — ALBUTEROL SULFATE 90 UG/1
AEROSOL, METERED RESPIRATORY (INHALATION)
Qty: 18 G | Refills: 0 | Status: SHIPPED | OUTPATIENT
Start: 2023-09-06

## 2023-09-06 RX ORDER — PROCHLORPERAZINE MALEATE 10 MG
10 TABLET ORAL EVERY 6 HOURS PRN
COMMUNITY
Start: 2023-01-24 | End: 2024-08-12

## 2023-09-06 ASSESSMENT — ENCOUNTER SYMPTOMS
NAUSEA: 0
SHORTNESS OF BREATH: 1
DIARRHEA: 0
COUGH: 1
INSOMNIA: 1
CONSTIPATION: 0
VOMITING: 0
ABDOMINAL PAIN: 0
WEIGHT LOSS: 0

## 2023-09-06 ASSESSMENT — PATIENT HEALTH QUESTIONNAIRE - PHQ9: SUM OF ALL RESPONSES TO PHQ QUESTIONS 1-9: 5

## 2023-09-06 ASSESSMENT — PAIN SCALES - GENERAL: PAINLEVEL: NO PAIN (0)

## 2023-09-06 NOTE — PROGRESS NOTES
"Radiation Oncology Rooming Note    September 6, 2023 9:02 AM     Jaxson Ramirez is a 68 year old male who presents for:       Chief Complaint   Patient presents with    Consult     Radiation Oncology Consultation        Initial Vitals: There were no vitals taken for this visit.   Estimated body mass index is 24.28 kg/m  as calculated from the following:    Height as of 4/12/22: 1.702 m (5' 7\").    Weight as of 11/8/22: 70.3 kg (155 lb).   There is no height or weight on file to calculate BSA.  Data Unavailable Comment: Data Unavailable       Allergies reviewed: Yes  Medications reviewed: Yes      Patient completed the following questionnaires: PHQ-9 and NCCN Distress Thermometer.       Patient was assessed using the NCCN psychosocial distress thermometer. Patient rated the score as a no score. Patient rated current stressors as family health issues, nervousness, worry, loss of interest in usual activities, breathing(SOB, NO O2), fatigue, sleep, and tingling in hands(has been going on since before cancer diagnosis per patient). Stressors brought to the attention of Jo Ann Camacho CNP for a score of 6 or greater or per nurses discretion.     Nutrition Assessment  1. Receiving Chemotherapy? No - 0 points  2. Weight Loss per Month (in pounds) Based on Usual Weight: 0    100# 100-120# 120-150# 150-200# greater than 200# Pt. System   greater than 5 5 - 6 6 - 8 7 - 10 greater than 10 1 Point   greater than 7 7- 9 9 - 11 11 - 14 greater than 15 2 Points   greater than 10 10 - 12 12 - 15 15 - 20 greater than 20 3 Points       3. Eating Problems: ( 1 Point for Each Problem)       Loss of Appetite     No - 0 points    Difficulty Swallowing    No - 0 points   Nausea    No - 0 points    Early Satiety    Yes  - 1 point   Vomiting    No - 0 points    Taste/Smell Aversions  No - 0 points    Diarrhea    No - 0 points    Esophageal Reflux   No - 0 points   Mouth Soreness/Difficulty Chewing  No - 0 points          Total: 1    4. "  Automatic Referral for the Following Diagnosis or Situations:  N/A    Total Score: 1 (Scores greater than or equal to 4 will receive a Dietician Consult)      Patient received folder containing the following:  advance directives information/application  [employer] Radiation Therapy and You booklet  radiation therapy web site resources hand out  radiation planning process packet  radiation therapy timeline  financial letter  vaccines during cancer treatment hand out  mental health services and providers packet  cancer resource list  family support group handouts   business card  radiation therapy business card      Financial assistance resources given to patient:  [email or web address] application     Patient given site specific instructions including:   skin care instructions.      Deborah Coulter LPN

## 2023-09-06 NOTE — PROGRESS NOTES
[facility]  DEPARTMENT OF RADIATION ONCOLOGY   SIMULATION NOTE    Name: Jaxson Ramirez MRN: [id number]   : 1955 (68 year old)  Date of Service: Sep 7, 2023        Diagnosis and Cancer Staging   No matching staging information was found for the patient.     Procedure   For non-SBRT treatment, the patient comes to clinic for simulation and radiation therapy for treatment as specified on the written consent (site of treatment, type of cancer). Therapy, Treatment Planning, and I reviewed the anticipated radiation therapy, clinical history and documentation, and radiographic information and images. We verified written consent for treatment. With the patient, we verified their identification, site, and side of treatment. We evaluated multiple setup positions and elected to simulate the patient in a modified supine position. We used orthogonal lasers to align them with the CT simulator. We immobilized the patient with a customized torso mold and accessories to improve the reproducibility and safety for daily radiation therapy. We placed radiopaque markers to assist in identifying topographical landmarks for simulation. We obtained  and axial CT imaging through the target region. We used virtual simulation techniques to verify the adequacy of the CT images and to create a preliminary setup isocenter. Motion management of respiratory and cardiac motion utilized 4D-CT and/or deep inspiration breath hold (DIBH) through the anticipated field of radiation therapy. We placed tattoos to rona the setup isocenter. The patient tolerated the procedure well and without complications. We will use available diagnostic and radiation therapy imaging studies for CT-based treatment planning with image fusion as indicated. I anticipate utilizing a form of intensity-modulated or 3D-conformal radiation therapy to develop a computerized treatment plan whose dosimetric analysis (e.g., dose-volume histogram  (DVH)) indicates adequate coverage of target tissues and sparing of nearby normal structures. We will complete routine QA procedures. The patient wished to proceed as recommended. We answered all questions to his satisfaction.    Implanted Cardiac Devices: None.      JAYA Mohr CNP   Department of Radiation Oncology  Tel: [phone]  Fax: [phone]    Adi Collier MD  Radiation Oncologist    I saw this patient while providing locum tenens coverage.

## 2023-09-07 ENCOUNTER — ALLIED HEALTH/NURSE VISIT (OUTPATIENT)
Dept: RADIATION ONCOLOGY | Facility: HOSPITAL | Age: 68
End: 2023-09-07
Payer: COMMERCIAL

## 2023-09-07 DIAGNOSIS — C34.90 MALIGNANT NEOPLASM OF LUNG, UNSPECIFIED LATERALITY, UNSPECIFIED PART OF LUNG (H): Primary | ICD-10-CM

## 2023-09-07 PROCEDURE — 77470 SPECIAL RADIATION TREATMENT: CPT | Mod: 26 | Performed by: STUDENT IN AN ORGANIZED HEALTH CARE EDUCATION/TRAINING PROGRAM

## 2023-09-07 PROCEDURE — 77470 SPECIAL RADIATION TREATMENT: CPT | Performed by: STUDENT IN AN ORGANIZED HEALTH CARE EDUCATION/TRAINING PROGRAM

## 2023-09-07 PROCEDURE — 77263 THER RADIOLOGY TX PLNG CPLX: CPT | Performed by: STUDENT IN AN ORGANIZED HEALTH CARE EDUCATION/TRAINING PROGRAM

## 2023-09-07 PROCEDURE — 77334 RADIATION TREATMENT AID(S): CPT | Performed by: STUDENT IN AN ORGANIZED HEALTH CARE EDUCATION/TRAINING PROGRAM

## 2023-09-07 PROCEDURE — 77334 RADIATION TREATMENT AID(S): CPT | Mod: 26 | Performed by: STUDENT IN AN ORGANIZED HEALTH CARE EDUCATION/TRAINING PROGRAM

## 2023-09-07 NOTE — PROGRESS NOTES
Patient was simulated today.    Beka CLANCY.)  Department of Radiation Oncology  Phone: [phone]

## 2023-09-15 DIAGNOSIS — E78.5 HYPERLIPIDEMIA, UNSPECIFIED HYPERLIPIDEMIA TYPE: ICD-10-CM

## 2023-09-15 NOTE — LETTER
September 19, 2023      Jaxson Ramirez  [address] [place] [address]   [place] [address]        Dear Jaxson,     APPOINTMENT REMINDER:   Our records indicates that it is time for you to be seen for your follow-up.      Your current medication request will be approved for one refill but you will need to be seen before any additional refills can be approved. Taking care of your health is important to us, and ongoing visits with your provider are vital to your care.    We look forward to seeing you in the near future.  You may call our office at [phone] to schedule a visit.     Please disregard this notice if you have already made an appointment.        Sincerely,  Annette William NP

## 2023-09-18 RX ORDER — ROSUVASTATIN CALCIUM 20 MG/1
TABLET, COATED ORAL
Qty: 30 TABLET | Refills: 0 | Status: SHIPPED | OUTPATIENT
Start: 2023-09-18 | End: 2024-08-12

## 2023-09-18 NOTE — TELEPHONE ENCOUNTER
Crestor 20 mg      Last Written Prescription Date:  3-23-23  Last Fill Quantity: 90,   # refills: 1  Last Office Visit: 11-8-22

## 2023-09-19 NOTE — TELEPHONE ENCOUNTER
Annette William, NP   to  Family Care Team 1       9/18/23 10:06 AM  Needs to be seen for additional refills. Please schedule      Recall letter mailed

## 2023-09-21 ENCOUNTER — DOCUMENTATION ONLY (OUTPATIENT)
Dept: RADIATION ONCOLOGY | Facility: HOSPITAL | Age: 68
End: 2023-09-21
Payer: COMMERCIAL

## 2023-09-21 PROCEDURE — 77301 RADIOTHERAPY DOSE PLAN IMRT: CPT | Mod: 26 | Performed by: RADIOLOGY

## 2023-09-21 PROCEDURE — 77293 RESPIRATOR MOTION MGMT SIMUL: CPT | Mod: 26 | Performed by: RADIOLOGY

## 2023-09-21 PROCEDURE — 77338 DESIGN MLC DEVICE FOR IMRT: CPT | Mod: 26 | Performed by: RADIOLOGY

## 2023-09-21 PROCEDURE — 77338 DESIGN MLC DEVICE FOR IMRT: CPT | Performed by: STUDENT IN AN ORGANIZED HEALTH CARE EDUCATION/TRAINING PROGRAM

## 2023-09-21 PROCEDURE — 77300 RADIATION THERAPY DOSE PLAN: CPT | Performed by: STUDENT IN AN ORGANIZED HEALTH CARE EDUCATION/TRAINING PROGRAM

## 2023-09-21 PROCEDURE — 77300 RADIATION THERAPY DOSE PLAN: CPT | Mod: 26 | Performed by: RADIOLOGY

## 2023-09-21 PROCEDURE — 77301 RADIOTHERAPY DOSE PLAN IMRT: CPT | Performed by: STUDENT IN AN ORGANIZED HEALTH CARE EDUCATION/TRAINING PROGRAM

## 2023-09-21 PROCEDURE — 77293 RESPIRATOR MOTION MGMT SIMUL: CPT | Performed by: STUDENT IN AN ORGANIZED HEALTH CARE EDUCATION/TRAINING PROGRAM

## 2023-09-26 ENCOUNTER — OFFICE VISIT (OUTPATIENT)
Dept: RADIATION ONCOLOGY | Facility: HOSPITAL | Age: 68
End: 2023-09-26
Payer: COMMERCIAL

## 2023-09-26 ENCOUNTER — APPOINTMENT (OUTPATIENT)
Dept: RADIATION ONCOLOGY | Facility: HOSPITAL | Age: 68
End: 2023-09-26
Attending: INTERNAL MEDICINE
Payer: COMMERCIAL

## 2023-09-26 ENCOUNTER — RESULTS ONLY (OUTPATIENT)
Dept: RADIATION ONCOLOGY | Facility: HOSPITAL | Age: 68
End: 2023-09-26

## 2023-09-26 VITALS — WEIGHT: 148 LBS | BODY MASS INDEX: 24.63 KG/M2

## 2023-09-26 DIAGNOSIS — C34.90 MALIGNANT NEOPLASM OF LUNG, UNSPECIFIED LATERALITY, UNSPECIFIED PART OF LUNG (H): Primary | ICD-10-CM

## 2023-09-26 LAB
RAD ONC ARIA COURSE ID: NORMAL
RAD ONC ARIA COURSE LAST TREATMENT DATE: NORMAL
RAD ONC ARIA COURSE START DATE: NORMAL
RAD ONC ARIA COURSE TREATMENT ELAPSED DAYS: 0
RAD ONC ARIA FIRST TREATMENT DATE: NORMAL
RAD ONC ARIA PLAN FRACTIONS TREATED TO DATE: 1
RAD ONC ARIA PLAN ID: NORMAL
RAD ONC ARIA PLAN PRESCRIBED DOSE PER FRACTION: 2 GY
RAD ONC ARIA PLAN TOTAL FRACTIONS PRESCRIBED: 30
RAD ONC ARIA PLAN TOTAL PRESCRIBED DOSE: 6000 CGY
RAD ONC ARIA REFERENCE POINT DOSAGE GIVEN TO DATE: NORMAL GY
RAD ONC ARIA REFERENCE POINT DOSAGE GIVEN TO DATE: NORMAL GY
RAD ONC ARIA REFERENCE POINT ID: NORMAL
RAD ONC ARIA REFERENCE POINT ID: NORMAL

## 2023-09-26 NOTE — PROGRESS NOTES
Progress Notes  Encounter Date: Sep 26, 2023  RADIATION ONCOLOGY WEEKLY MANAGEMENT PROGRESS NOTE     Patient Care Team         Relationship Specialty Notifications Start End    Annette William NP PCP - [facility]  10/22/19     Phone: [phone] Fax: [phone]         [facility] [address] [place] [address]    Annette William NP Assigned PCP   10/3/19     Phone: [phone] Fax: [phone]         [facility] [address] [address]                    DIAGNOSIS:  Cancer Staging   No matching staging information was found for the patient.        RADIATION THERAPY:    Jaxson Ramirez has received 200 cGy to date to right lung.   Treatment 1/30  Total planned dose: 6000 cGy      SUBJECTIVE:    Currently he reports doing well, denies difficulty with radiation therapy.         OBJECTIVE:    WEIGHT: 148 lbs 0 oz. Wt 67.1 kg (148 lb)   BMI 24.63 kg/m    Examination reveals an alert non ill appearing male patient, respirations non labored.        IMPRESSION:  Routine tolerance to radiation therapy.       PLAN:  Continue treatment as planned. Discussed with patient and his wife that we will re-sim tomorrow to check out some changes noted on today's set up.       Medical record and imaging reviewed by covering locum provider.      Jo Ann Camacho, CNP

## 2023-09-27 ENCOUNTER — APPOINTMENT (OUTPATIENT)
Dept: RADIATION ONCOLOGY | Facility: HOSPITAL | Age: 68
End: 2023-09-27
Payer: COMMERCIAL

## 2023-09-27 ENCOUNTER — ALLIED HEALTH/NURSE VISIT (OUTPATIENT)
Dept: RADIATION ONCOLOGY | Facility: HOSPITAL | Age: 68
End: 2023-09-27

## 2023-09-27 ENCOUNTER — RESULTS ONLY (OUTPATIENT)
Dept: RADIATION ONCOLOGY | Facility: HOSPITAL | Age: 68
End: 2023-09-27

## 2023-09-27 DIAGNOSIS — C34.90 MALIGNANT NEOPLASM OF LUNG, UNSPECIFIED LATERALITY, UNSPECIFIED PART OF LUNG (H): Primary | ICD-10-CM

## 2023-09-27 LAB
RAD ONC ARIA COURSE ID: NORMAL
RAD ONC ARIA COURSE LAST TREATMENT DATE: NORMAL
RAD ONC ARIA COURSE START DATE: NORMAL
RAD ONC ARIA COURSE TREATMENT ELAPSED DAYS: 1
RAD ONC ARIA FIRST TREATMENT DATE: NORMAL
RAD ONC ARIA PLAN FRACTIONS TREATED TO DATE: 2
RAD ONC ARIA PLAN ID: NORMAL
RAD ONC ARIA PLAN PRESCRIBED DOSE PER FRACTION: 2 GY
RAD ONC ARIA PLAN TOTAL FRACTIONS PRESCRIBED: 30
RAD ONC ARIA PLAN TOTAL PRESCRIBED DOSE: 6000 CGY
RAD ONC ARIA REFERENCE POINT DOSAGE GIVEN TO DATE: NORMAL GY
RAD ONC ARIA REFERENCE POINT DOSAGE GIVEN TO DATE: NORMAL GY
RAD ONC ARIA REFERENCE POINT ID: NORMAL
RAD ONC ARIA REFERENCE POINT ID: NORMAL

## 2023-09-27 PROCEDURE — 77386 HC IMRT TREATMENT DELIVERY, COMPLEX: CPT | Performed by: RADIOLOGY

## 2023-09-27 PROCEDURE — 77014 HC CT GUIDE FOR PLACEMENT RADIATION THERAPY FIELDS: CPT | Performed by: RADIOLOGY

## 2023-09-27 PROCEDURE — 77014 PR CT GUIDE FOR PLACEMENT RADIATION THERAPY FIELDS: CPT | Mod: 26 | Performed by: RADIOLOGY

## 2023-09-28 ENCOUNTER — HOSPITAL ENCOUNTER (OUTPATIENT)
Dept: CT IMAGING | Facility: HOSPITAL | Age: 68
Discharge: HOME | End: 2023-09-28
Payer: COMMERCIAL

## 2023-09-28 DIAGNOSIS — C34.90 MALIGNANT NEOPLASM OF LUNG, UNSPECIFIED LATERALITY, UNSPECIFIED PART OF LUNG (H): ICD-10-CM

## 2023-09-28 DIAGNOSIS — C34.90 MALIGNANT NEOPLASM OF LUNG, UNSPECIFIED LATERALITY, UNSPECIFIED PART OF LUNG (H): Primary | ICD-10-CM

## 2023-09-28 PROCEDURE — 250N000011 HC RX IP 250 OP 636

## 2023-09-28 PROCEDURE — 74177 CT ABD & PELVIS W/CONTRAST: CPT

## 2023-09-28 RX ORDER — IOPAMIDOL 755 MG/ML
72 INJECTION, SOLUTION INTRAVASCULAR ONCE
Status: COMPLETED | OUTPATIENT
Start: 2023-09-28 | End: 2023-09-28

## 2023-09-28 RX ORDER — IOPAMIDOL 755 MG/ML
17 INJECTION, SOLUTION INTRAVASCULAR ONCE
Status: COMPLETED | OUTPATIENT
Start: 2023-09-28 | End: 2023-09-28

## 2023-09-28 RX ADMIN — IOPAMIDOL 72 ML: 755 INJECTION, SOLUTION INTRAVENOUS at 13:19

## 2023-09-28 RX ADMIN — IOPAMIDOL 17 ML: 755 INJECTION, SOLUTION INTRAVENOUS at 13:18

## 2023-10-02 ENCOUNTER — RESULTS ONLY (OUTPATIENT)
Dept: RADIATION ONCOLOGY | Facility: HOSPITAL | Age: 68
End: 2023-10-02

## 2023-10-02 ENCOUNTER — APPOINTMENT (OUTPATIENT)
Dept: RADIATION ONCOLOGY | Facility: HOSPITAL | Age: 68
End: 2023-10-02
Payer: COMMERCIAL

## 2023-10-02 ENCOUNTER — OFFICE VISIT (OUTPATIENT)
Dept: RADIATION ONCOLOGY | Facility: HOSPITAL | Age: 68
End: 2023-10-02
Payer: COMMERCIAL

## 2023-10-02 VITALS
OXYGEN SATURATION: 95 % | HEART RATE: 115 BPM | SYSTOLIC BLOOD PRESSURE: 110 MMHG | DIASTOLIC BLOOD PRESSURE: 68 MMHG | TEMPERATURE: 97.7 F | WEIGHT: 149.4 LBS | BODY MASS INDEX: 24.86 KG/M2

## 2023-10-02 DIAGNOSIS — C34.90 MALIGNANT NEOPLASM OF LUNG, UNSPECIFIED LATERALITY, UNSPECIFIED PART OF LUNG (H): Primary | ICD-10-CM

## 2023-10-02 LAB
RAD ONC ARIA COURSE ID: NORMAL
RAD ONC ARIA COURSE LAST TREATMENT DATE: NORMAL
RAD ONC ARIA COURSE START DATE: NORMAL
RAD ONC ARIA COURSE TREATMENT ELAPSED DAYS: 6
RAD ONC ARIA FIRST TREATMENT DATE: NORMAL
RAD ONC ARIA PLAN FRACTIONS TREATED TO DATE: 3
RAD ONC ARIA PLAN ID: NORMAL
RAD ONC ARIA PLAN PRESCRIBED DOSE PER FRACTION: 2 GY
RAD ONC ARIA PLAN TOTAL FRACTIONS PRESCRIBED: 30
RAD ONC ARIA PLAN TOTAL PRESCRIBED DOSE: 6000 CGY
RAD ONC ARIA REFERENCE POINT DOSAGE GIVEN TO DATE: NORMAL GY
RAD ONC ARIA REFERENCE POINT DOSAGE GIVEN TO DATE: NORMAL GY
RAD ONC ARIA REFERENCE POINT ID: NORMAL
RAD ONC ARIA REFERENCE POINT ID: NORMAL

## 2023-10-02 NOTE — PROGRESS NOTES
Progress Notes  Encounter Date: Oct 2, 2023  JAYA Mohr CNP     RADIATION ONCOLOGY WEEKLY MANAGEMENT PROGRESS NOTE     Patient Care Team         Relationship Specialty Notifications Start End    Annette William NP PCP - [facility]  10/22/19     Phone: [phone] Fax: [phone]         [facility] [address] [place] [address]    Annette William NP Assigned PCP   10/3/19     Phone: [phone] Fax: [phone]         [facility] [address] [place] [address]    Abimael Stacy MD Assigned Cancer Care Provider   9/30/23     Phone: [phone] Fax: [phone]         [address] [place] [address]                    DIAGNOSIS:  Cancer Staging   No matching staging information was found for the patient.        RADIATION THERAPY:    Jaxson Ramirez has received 600 cGy to date to right lung.   Treatment 3/30  Total planned dose: 6000 cGy      SUBJECTIVE:    Currently he reports doing well, denies changes since starting radiation therapy.         OBJECTIVE:    WEIGHT: 149 lbs 6.4 oz. /68   Pulse 115   Temp 97.7  F (36.5  C)   Wt 67.8 kg (149 lb 6.4 oz)   SpO2 95%   BMI 24.86 kg/m    Examination reveals an alert, non acutely ill appearing male patient, respirations non labored.        IMPRESSION:  Routine tolerance to radiation therapy.       PLAN:  Continue treatment as planned. Discussed recent dx CT scan and tumor progression. Plan of care will include continuing with chemo/rads and modification of the radiation therapy field. Patient and wife report understanding and agree with plan of care.         Medical record and imaging reviewed by covering locum provider.      JAYA Mohr CNP, MD  Radiation Oncologist

## 2023-10-03 ENCOUNTER — RESULTS ONLY (OUTPATIENT)
Dept: RADIATION ONCOLOGY | Facility: HOSPITAL | Age: 68
End: 2023-10-03

## 2023-10-03 ENCOUNTER — APPOINTMENT (OUTPATIENT)
Dept: RADIATION ONCOLOGY | Facility: HOSPITAL | Age: 68
End: 2023-10-03
Payer: COMMERCIAL

## 2023-10-03 LAB
RAD ONC ARIA COURSE ID: NORMAL
RAD ONC ARIA COURSE LAST TREATMENT DATE: NORMAL
RAD ONC ARIA COURSE START DATE: NORMAL
RAD ONC ARIA COURSE TREATMENT ELAPSED DAYS: 7
RAD ONC ARIA FIRST TREATMENT DATE: NORMAL
RAD ONC ARIA PLAN FRACTIONS TREATED TO DATE: 4
RAD ONC ARIA PLAN ID: NORMAL
RAD ONC ARIA PLAN PRESCRIBED DOSE PER FRACTION: 2 GY
RAD ONC ARIA PLAN TOTAL FRACTIONS PRESCRIBED: 30
RAD ONC ARIA PLAN TOTAL PRESCRIBED DOSE: 6000 CGY
RAD ONC ARIA REFERENCE POINT DOSAGE GIVEN TO DATE: NORMAL GY
RAD ONC ARIA REFERENCE POINT DOSAGE GIVEN TO DATE: NORMAL GY
RAD ONC ARIA REFERENCE POINT ID: NORMAL
RAD ONC ARIA REFERENCE POINT ID: NORMAL

## 2023-10-03 PROCEDURE — 77386 HC IMRT TREATMENT DELIVERY, COMPLEX: CPT | Performed by: STUDENT IN AN ORGANIZED HEALTH CARE EDUCATION/TRAINING PROGRAM

## 2023-10-03 PROCEDURE — 77014 PR CT GUIDE FOR PLACEMENT RADIATION THERAPY FIELDS: CPT | Mod: 26 | Performed by: STUDENT IN AN ORGANIZED HEALTH CARE EDUCATION/TRAINING PROGRAM

## 2023-10-04 ENCOUNTER — APPOINTMENT (OUTPATIENT)
Dept: RADIATION ONCOLOGY | Facility: HOSPITAL | Age: 68
End: 2023-10-04
Payer: COMMERCIAL

## 2023-10-04 ENCOUNTER — RESULTS ONLY (OUTPATIENT)
Dept: RADIATION ONCOLOGY | Facility: HOSPITAL | Age: 68
End: 2023-10-04

## 2023-10-04 LAB
RAD ONC ARIA COURSE ID: NORMAL
RAD ONC ARIA COURSE LAST TREATMENT DATE: NORMAL
RAD ONC ARIA COURSE START DATE: NORMAL
RAD ONC ARIA COURSE TREATMENT ELAPSED DAYS: 8
RAD ONC ARIA FIRST TREATMENT DATE: NORMAL
RAD ONC ARIA PLAN FRACTIONS TREATED TO DATE: 5
RAD ONC ARIA PLAN ID: NORMAL
RAD ONC ARIA PLAN PRESCRIBED DOSE PER FRACTION: 2 GY
RAD ONC ARIA PLAN TOTAL FRACTIONS PRESCRIBED: 30
RAD ONC ARIA PLAN TOTAL PRESCRIBED DOSE: 6000 CGY
RAD ONC ARIA REFERENCE POINT DOSAGE GIVEN TO DATE: NORMAL GY
RAD ONC ARIA REFERENCE POINT DOSAGE GIVEN TO DATE: NORMAL GY
RAD ONC ARIA REFERENCE POINT ID: NORMAL
RAD ONC ARIA REFERENCE POINT ID: NORMAL

## 2023-10-04 PROCEDURE — 77386 HC IMRT TREATMENT DELIVERY, COMPLEX: CPT | Performed by: STUDENT IN AN ORGANIZED HEALTH CARE EDUCATION/TRAINING PROGRAM

## 2023-10-04 PROCEDURE — 77427 RADIATION TX MANAGEMENT X5: CPT | Performed by: STUDENT IN AN ORGANIZED HEALTH CARE EDUCATION/TRAINING PROGRAM

## 2023-10-04 PROCEDURE — 77014 PR CT GUIDE FOR PLACEMENT RADIATION THERAPY FIELDS: CPT | Mod: 26 | Performed by: STUDENT IN AN ORGANIZED HEALTH CARE EDUCATION/TRAINING PROGRAM

## 2023-10-04 PROCEDURE — 77336 RADIATION PHYSICS CONSULT: CPT | Performed by: STUDENT IN AN ORGANIZED HEALTH CARE EDUCATION/TRAINING PROGRAM

## 2023-10-05 ENCOUNTER — RESULTS ONLY (OUTPATIENT)
Dept: RADIATION ONCOLOGY | Facility: HOSPITAL | Age: 68
End: 2023-10-05

## 2023-10-05 ENCOUNTER — APPOINTMENT (OUTPATIENT)
Dept: RADIATION ONCOLOGY | Facility: HOSPITAL | Age: 68
End: 2023-10-05
Payer: COMMERCIAL

## 2023-10-05 LAB
RAD ONC ARIA COURSE ID: NORMAL
RAD ONC ARIA COURSE LAST TREATMENT DATE: NORMAL
RAD ONC ARIA COURSE START DATE: NORMAL
RAD ONC ARIA COURSE TREATMENT ELAPSED DAYS: 9
RAD ONC ARIA FIRST TREATMENT DATE: NORMAL
RAD ONC ARIA PLAN FRACTIONS TREATED TO DATE: 1
RAD ONC ARIA PLAN ID: NORMAL
RAD ONC ARIA PLAN PRESCRIBED DOSE PER FRACTION: 2 GY
RAD ONC ARIA PLAN TOTAL FRACTIONS PRESCRIBED: 25
RAD ONC ARIA PLAN TOTAL PRESCRIBED DOSE: 5000 CGY
RAD ONC ARIA REFERENCE POINT DOSAGE GIVEN TO DATE: NORMAL GY
RAD ONC ARIA REFERENCE POINT ID: NORMAL

## 2023-10-05 PROCEDURE — 77386 HC IMRT TREATMENT DELIVERY, COMPLEX: CPT | Performed by: STUDENT IN AN ORGANIZED HEALTH CARE EDUCATION/TRAINING PROGRAM

## 2023-10-05 PROCEDURE — 77014 PR CT GUIDE FOR PLACEMENT RADIATION THERAPY FIELDS: CPT | Mod: 26 | Performed by: STUDENT IN AN ORGANIZED HEALTH CARE EDUCATION/TRAINING PROGRAM

## 2023-10-06 ENCOUNTER — APPOINTMENT (OUTPATIENT)
Dept: RADIATION ONCOLOGY | Facility: HOSPITAL | Age: 68
End: 2023-10-06
Payer: COMMERCIAL

## 2023-10-06 ENCOUNTER — RESULTS ONLY (OUTPATIENT)
Dept: RADIATION ONCOLOGY | Facility: HOSPITAL | Age: 68
End: 2023-10-06

## 2023-10-06 LAB
RAD ONC ARIA COURSE ID: NORMAL
RAD ONC ARIA COURSE LAST TREATMENT DATE: NORMAL
RAD ONC ARIA COURSE START DATE: NORMAL
RAD ONC ARIA COURSE TREATMENT ELAPSED DAYS: 10
RAD ONC ARIA FIRST TREATMENT DATE: NORMAL
RAD ONC ARIA PLAN FRACTIONS TREATED TO DATE: 2
RAD ONC ARIA PLAN ID: NORMAL
RAD ONC ARIA PLAN PRESCRIBED DOSE PER FRACTION: 2 GY
RAD ONC ARIA PLAN TOTAL FRACTIONS PRESCRIBED: 25
RAD ONC ARIA PLAN TOTAL PRESCRIBED DOSE: 5000 CGY
RAD ONC ARIA REFERENCE POINT DOSAGE GIVEN TO DATE: NORMAL GY
RAD ONC ARIA REFERENCE POINT ID: NORMAL

## 2023-10-06 PROCEDURE — 77014 PR CT GUIDE FOR PLACEMENT RADIATION THERAPY FIELDS: CPT | Mod: 26 | Performed by: STUDENT IN AN ORGANIZED HEALTH CARE EDUCATION/TRAINING PROGRAM

## 2023-10-06 PROCEDURE — 77386 HC IMRT TREATMENT DELIVERY, COMPLEX: CPT | Performed by: STUDENT IN AN ORGANIZED HEALTH CARE EDUCATION/TRAINING PROGRAM

## 2023-10-09 ENCOUNTER — APPOINTMENT (OUTPATIENT)
Dept: RADIATION ONCOLOGY | Facility: HOSPITAL | Age: 68
End: 2023-10-09
Payer: COMMERCIAL

## 2023-10-09 ENCOUNTER — RESULTS ONLY (OUTPATIENT)
Dept: RADIATION ONCOLOGY | Facility: HOSPITAL | Age: 68
End: 2023-10-09

## 2023-10-09 LAB
RAD ONC ARIA COURSE ID: NORMAL
RAD ONC ARIA COURSE LAST TREATMENT DATE: NORMAL
RAD ONC ARIA COURSE START DATE: NORMAL
RAD ONC ARIA COURSE TREATMENT ELAPSED DAYS: 13
RAD ONC ARIA FIRST TREATMENT DATE: NORMAL
RAD ONC ARIA PLAN FRACTIONS TREATED TO DATE: 3
RAD ONC ARIA PLAN ID: NORMAL
RAD ONC ARIA PLAN PRESCRIBED DOSE PER FRACTION: 2 GY
RAD ONC ARIA PLAN TOTAL FRACTIONS PRESCRIBED: 25
RAD ONC ARIA PLAN TOTAL PRESCRIBED DOSE: 5000 CGY
RAD ONC ARIA REFERENCE POINT DOSAGE GIVEN TO DATE: NORMAL GY
RAD ONC ARIA REFERENCE POINT ID: NORMAL

## 2023-10-09 PROCEDURE — 77014 PR CT GUIDE FOR PLACEMENT RADIATION THERAPY FIELDS: CPT | Mod: 26 | Performed by: STUDENT IN AN ORGANIZED HEALTH CARE EDUCATION/TRAINING PROGRAM

## 2023-10-09 PROCEDURE — 77386 HC IMRT TREATMENT DELIVERY, COMPLEX: CPT | Performed by: STUDENT IN AN ORGANIZED HEALTH CARE EDUCATION/TRAINING PROGRAM

## 2023-10-11 ENCOUNTER — RESULTS ONLY (OUTPATIENT)
Dept: RADIATION ONCOLOGY | Facility: HOSPITAL | Age: 68
End: 2023-10-11

## 2023-10-11 ENCOUNTER — OFFICE VISIT (OUTPATIENT)
Dept: RADIATION ONCOLOGY | Facility: HOSPITAL | Age: 68
End: 2023-10-11

## 2023-10-11 VITALS
BODY MASS INDEX: 24.63 KG/M2 | WEIGHT: 148 LBS | OXYGEN SATURATION: 99 % | DIASTOLIC BLOOD PRESSURE: 72 MMHG | SYSTOLIC BLOOD PRESSURE: 124 MMHG | TEMPERATURE: 98.3 F | RESPIRATION RATE: 21 BRPM | HEART RATE: 110 BPM

## 2023-10-11 DIAGNOSIS — C34.90 MALIGNANT NEOPLASM OF LUNG, UNSPECIFIED LATERALITY, UNSPECIFIED PART OF LUNG (H): Primary | ICD-10-CM

## 2023-10-11 LAB
RAD ONC ARIA COURSE ID: NORMAL
RAD ONC ARIA COURSE LAST TREATMENT DATE: NORMAL
RAD ONC ARIA COURSE START DATE: NORMAL
RAD ONC ARIA COURSE TREATMENT ELAPSED DAYS: 15
RAD ONC ARIA FIRST TREATMENT DATE: NORMAL
RAD ONC ARIA PLAN FRACTIONS TREATED TO DATE: 4
RAD ONC ARIA PLAN ID: NORMAL
RAD ONC ARIA PLAN PRESCRIBED DOSE PER FRACTION: 2 GY
RAD ONC ARIA PLAN TOTAL FRACTIONS PRESCRIBED: 25
RAD ONC ARIA PLAN TOTAL PRESCRIBED DOSE: 5000 CGY
RAD ONC ARIA REFERENCE POINT DOSAGE GIVEN TO DATE: NORMAL GY
RAD ONC ARIA REFERENCE POINT ID: NORMAL

## 2023-10-11 ASSESSMENT — PAIN SCALES - GENERAL: PAINLEVEL: NO PAIN (0)

## 2023-10-11 NOTE — PROGRESS NOTES
Progress Notes  Encounter Date: Oct 11, 2023  JAYA Mohr CNP     RADIATION ONCOLOGY WEEKLY MANAGEMENT PROGRESS NOTE     Patient Care Team         Relationship Specialty Notifications Start End    Annette William NP PCP - [facility]  10/22/19     Phone: [phone] Fax: [phone]         [facility] [address] [place] [address]    Annette William NP Assigned PCP   10/3/19     Phone: [phone] Fax: [phone]         [facility] [address] [facility] [address] [place] [address]    Abimael Stacy MD Assigned Cancer Care Provider   9/30/23     Phone: [phone] Fax: [phone]         [address]  [place] [address]                    DIAGNOSIS:  Cancer Staging   No matching staging information was found for the patient.          RADIATION THERAPY:    Jaxson Ramirez has received 1800 cGy to date to Right lung.   Treatment 5/5  Re plan treatment 4/25  Total planned dose: 6000 cGy      SUBJECTIVE:    Currently he finds treatment to be going well.  Patient reports chemotherapy was cancelled this week due to labs.       OBJECTIVE:    WEIGHT: 148 lbs 0 oz. /72 (BP Location: Right arm, Patient Position: Chair, Cuff Size: Adult Regular)   Pulse 110   Temp 98.3  F (36.8  C) (Tympanic)   Resp 21   Wt 67.1 kg (148 lb)   SpO2 99%   BMI 24.63 kg/m    Examination reveals an alert non ill appearing male patient, respirations non labored, no skin changes.        IMPRESSION:  Routine tolerance to radiation therapy.       PLAN:  Continue treatment as planned.        Medical record and imaging reviewed by covering locum provider.      JAYA Mohr CNP        Adi Collier MD  Radiation Oncologist

## 2023-10-12 ENCOUNTER — RESULTS ONLY (OUTPATIENT)
Dept: RADIATION ONCOLOGY | Facility: HOSPITAL | Age: 68
End: 2023-10-12

## 2023-10-12 ENCOUNTER — APPOINTMENT (OUTPATIENT)
Dept: RADIATION ONCOLOGY | Facility: HOSPITAL | Age: 68
End: 2023-10-12
Payer: COMMERCIAL

## 2023-10-12 LAB
RAD ONC ARIA COURSE ID: NORMAL
RAD ONC ARIA COURSE LAST TREATMENT DATE: NORMAL
RAD ONC ARIA COURSE START DATE: NORMAL
RAD ONC ARIA COURSE TREATMENT ELAPSED DAYS: 16
RAD ONC ARIA FIRST TREATMENT DATE: NORMAL
RAD ONC ARIA PLAN FRACTIONS TREATED TO DATE: 5
RAD ONC ARIA PLAN ID: NORMAL
RAD ONC ARIA PLAN PRESCRIBED DOSE PER FRACTION: 2 GY
RAD ONC ARIA PLAN TOTAL FRACTIONS PRESCRIBED: 25
RAD ONC ARIA PLAN TOTAL PRESCRIBED DOSE: 5000 CGY
RAD ONC ARIA REFERENCE POINT DOSAGE GIVEN TO DATE: NORMAL GY
RAD ONC ARIA REFERENCE POINT ID: NORMAL

## 2023-10-12 PROCEDURE — 77014 PR CT GUIDE FOR PLACEMENT RADIATION THERAPY FIELDS: CPT | Mod: 26 | Performed by: STUDENT IN AN ORGANIZED HEALTH CARE EDUCATION/TRAINING PROGRAM

## 2023-10-12 PROCEDURE — 77336 RADIATION PHYSICS CONSULT: CPT | Performed by: STUDENT IN AN ORGANIZED HEALTH CARE EDUCATION/TRAINING PROGRAM

## 2023-10-12 PROCEDURE — 77427 RADIATION TX MANAGEMENT X5: CPT | Performed by: STUDENT IN AN ORGANIZED HEALTH CARE EDUCATION/TRAINING PROGRAM

## 2023-10-12 PROCEDURE — 77386 HC IMRT TREATMENT DELIVERY, COMPLEX: CPT | Performed by: STUDENT IN AN ORGANIZED HEALTH CARE EDUCATION/TRAINING PROGRAM

## 2023-10-12 PROCEDURE — 77014 HC CT GUIDE FOR PLACEMENT RADIATION THERAPY FIELDS: CPT | Performed by: STUDENT IN AN ORGANIZED HEALTH CARE EDUCATION/TRAINING PROGRAM

## 2023-10-13 ENCOUNTER — APPOINTMENT (OUTPATIENT)
Dept: RADIATION ONCOLOGY | Facility: HOSPITAL | Age: 68
End: 2023-10-13
Payer: COMMERCIAL

## 2023-10-13 ENCOUNTER — RESULTS ONLY (OUTPATIENT)
Dept: RADIATION ONCOLOGY | Facility: HOSPITAL | Age: 68
End: 2023-10-13

## 2023-10-13 LAB
RAD ONC ARIA COURSE ID: NORMAL
RAD ONC ARIA COURSE LAST TREATMENT DATE: NORMAL
RAD ONC ARIA COURSE START DATE: NORMAL
RAD ONC ARIA COURSE TREATMENT ELAPSED DAYS: 17
RAD ONC ARIA FIRST TREATMENT DATE: NORMAL
RAD ONC ARIA PLAN FRACTIONS TREATED TO DATE: 6
RAD ONC ARIA PLAN ID: NORMAL
RAD ONC ARIA PLAN PRESCRIBED DOSE PER FRACTION: 2 GY
RAD ONC ARIA PLAN TOTAL FRACTIONS PRESCRIBED: 25
RAD ONC ARIA PLAN TOTAL PRESCRIBED DOSE: 5000 CGY
RAD ONC ARIA REFERENCE POINT DOSAGE GIVEN TO DATE: NORMAL GY
RAD ONC ARIA REFERENCE POINT ID: NORMAL

## 2023-10-13 PROCEDURE — 77014 HC CT GUIDE FOR PLACEMENT RADIATION THERAPY FIELDS: CPT | Performed by: STUDENT IN AN ORGANIZED HEALTH CARE EDUCATION/TRAINING PROGRAM

## 2023-10-13 PROCEDURE — 77386 HC IMRT TREATMENT DELIVERY, COMPLEX: CPT | Performed by: STUDENT IN AN ORGANIZED HEALTH CARE EDUCATION/TRAINING PROGRAM

## 2023-10-13 PROCEDURE — 77014 PR CT GUIDE FOR PLACEMENT RADIATION THERAPY FIELDS: CPT | Mod: 26 | Performed by: STUDENT IN AN ORGANIZED HEALTH CARE EDUCATION/TRAINING PROGRAM

## 2023-10-16 ENCOUNTER — RESULTS ONLY (OUTPATIENT)
Dept: RADIATION ONCOLOGY | Facility: HOSPITAL | Age: 68
End: 2023-10-16

## 2023-10-16 ENCOUNTER — APPOINTMENT (OUTPATIENT)
Dept: RADIATION ONCOLOGY | Facility: HOSPITAL | Age: 68
End: 2023-10-16
Payer: COMMERCIAL

## 2023-10-16 LAB
RAD ONC ARIA COURSE ID: NORMAL
RAD ONC ARIA COURSE LAST TREATMENT DATE: NORMAL
RAD ONC ARIA COURSE START DATE: NORMAL
RAD ONC ARIA COURSE TREATMENT ELAPSED DAYS: 20
RAD ONC ARIA FIRST TREATMENT DATE: NORMAL
RAD ONC ARIA PLAN FRACTIONS TREATED TO DATE: 7
RAD ONC ARIA PLAN ID: NORMAL
RAD ONC ARIA PLAN PRESCRIBED DOSE PER FRACTION: 2 GY
RAD ONC ARIA PLAN TOTAL FRACTIONS PRESCRIBED: 25
RAD ONC ARIA PLAN TOTAL PRESCRIBED DOSE: 5000 CGY
RAD ONC ARIA REFERENCE POINT DOSAGE GIVEN TO DATE: NORMAL GY
RAD ONC ARIA REFERENCE POINT ID: NORMAL

## 2023-10-16 PROCEDURE — 77386 HC IMRT TREATMENT DELIVERY, COMPLEX: CPT | Performed by: STUDENT IN AN ORGANIZED HEALTH CARE EDUCATION/TRAINING PROGRAM

## 2023-10-16 PROCEDURE — 77014 HC CT GUIDE FOR PLACEMENT RADIATION THERAPY FIELDS: CPT | Performed by: STUDENT IN AN ORGANIZED HEALTH CARE EDUCATION/TRAINING PROGRAM

## 2023-10-16 PROCEDURE — 77014 PR CT GUIDE FOR PLACEMENT RADIATION THERAPY FIELDS: CPT | Mod: 26 | Performed by: STUDENT IN AN ORGANIZED HEALTH CARE EDUCATION/TRAINING PROGRAM

## 2023-10-16 NOTE — PROGRESS NOTES
Progress Notes  Encounter Date: Oct 17, 2023  JAYA Mohr CNP     RADIATION ONCOLOGY WEEKLY MANAGEMENT PROGRESS NOTE     Patient Care Team         Relationship Specialty Notifications Start End    Annette William NP PCP - [facility]  10/22/19     Phone: [phone] Fax: [phone]         [facility] [address] [place] [address]    Annette William NP Assigned PCP   10/3/19     Phone: [phone] Fax: [phone]         [facility] [address] [facility] [address] [place] [address]    Abimael Stacy MD Assigned Cancer Care Provider   9/30/23     Phone: [phone] Fax: [phone]         [address] [place] [address]                    DIAGNOSIS:  Cancer Staging   No matching staging information was found for the patient.          RADIATION THERAPY:    Jaxson Ramirez has received 2800 cGy to date to Right lung.   Treatment 5/5  Re plan treatment 9/25  Total planned dose: 6000 cGy      SUBJECTIVE:    Currently he is more SOB when lying on his side.  Using albuterol inhaler as needed and it is effective.  Denies a cough.  Energy level is unchanged.       OBJECTIVE:    WEIGHT: 147 lbs 14.4 oz. /80 (BP Location: Left arm, Patient Position: Chair, Cuff Size: Adult Regular)   Pulse 106   Temp 98.3  F (36.8  C) (Tympanic)   Resp 16   Wt 67.1 kg (147 lb 14.4 oz)   SpO2 94%   BMI 24.61 kg/m    Examination reveals an alert non ill appearing male patient, respirations non labored, no skin changes.        IMPRESSION:  Routine tolerance to radiation therapy.       PLAN:  Continue treatment as planned. Continue to use inh as needed/ prescribed.       Medical record and imaging reviewed by covering locum provider.      JAYA Mohr CNP, MD  Radiation Oncologist

## 2023-10-17 ENCOUNTER — OFFICE VISIT (OUTPATIENT)
Dept: RADIATION ONCOLOGY | Facility: HOSPITAL | Age: 68
End: 2023-10-17
Payer: COMMERCIAL

## 2023-10-17 ENCOUNTER — RESULTS ONLY (OUTPATIENT)
Dept: RADIATION ONCOLOGY | Facility: HOSPITAL | Age: 68
End: 2023-10-17

## 2023-10-17 VITALS
TEMPERATURE: 98.3 F | SYSTOLIC BLOOD PRESSURE: 128 MMHG | WEIGHT: 147.9 LBS | HEART RATE: 106 BPM | DIASTOLIC BLOOD PRESSURE: 80 MMHG | RESPIRATION RATE: 16 BRPM | OXYGEN SATURATION: 94 % | BODY MASS INDEX: 24.61 KG/M2

## 2023-10-17 DIAGNOSIS — C34.90 MALIGNANT NEOPLASM OF LUNG, UNSPECIFIED LATERALITY, UNSPECIFIED PART OF LUNG (H): Primary | ICD-10-CM

## 2023-10-17 LAB
RAD ONC ARIA COURSE ID: NORMAL
RAD ONC ARIA COURSE LAST TREATMENT DATE: NORMAL
RAD ONC ARIA COURSE START DATE: NORMAL
RAD ONC ARIA COURSE TREATMENT ELAPSED DAYS: 21
RAD ONC ARIA FIRST TREATMENT DATE: NORMAL
RAD ONC ARIA PLAN FRACTIONS TREATED TO DATE: 8
RAD ONC ARIA PLAN ID: NORMAL
RAD ONC ARIA PLAN PRESCRIBED DOSE PER FRACTION: 2 GY
RAD ONC ARIA PLAN TOTAL FRACTIONS PRESCRIBED: 25
RAD ONC ARIA PLAN TOTAL PRESCRIBED DOSE: 5000 CGY
RAD ONC ARIA REFERENCE POINT DOSAGE GIVEN TO DATE: NORMAL GY
RAD ONC ARIA REFERENCE POINT ID: NORMAL

## 2023-10-17 ASSESSMENT — PAIN SCALES - GENERAL: PAINLEVEL: NO PAIN (0)

## 2023-10-18 ENCOUNTER — RESULTS ONLY (OUTPATIENT)
Dept: RADIATION ONCOLOGY | Facility: HOSPITAL | Age: 68
End: 2023-10-18

## 2023-10-18 ENCOUNTER — APPOINTMENT (OUTPATIENT)
Dept: RADIATION ONCOLOGY | Facility: HOSPITAL | Age: 68
End: 2023-10-18
Payer: COMMERCIAL

## 2023-10-18 LAB
RAD ONC ARIA COURSE ID: NORMAL
RAD ONC ARIA COURSE LAST TREATMENT DATE: NORMAL
RAD ONC ARIA COURSE START DATE: NORMAL
RAD ONC ARIA COURSE TREATMENT ELAPSED DAYS: 22
RAD ONC ARIA FIRST TREATMENT DATE: NORMAL
RAD ONC ARIA PLAN FRACTIONS TREATED TO DATE: 9
RAD ONC ARIA PLAN ID: NORMAL
RAD ONC ARIA PLAN PRESCRIBED DOSE PER FRACTION: 2 GY
RAD ONC ARIA PLAN TOTAL FRACTIONS PRESCRIBED: 25
RAD ONC ARIA PLAN TOTAL PRESCRIBED DOSE: 5000 CGY
RAD ONC ARIA REFERENCE POINT DOSAGE GIVEN TO DATE: NORMAL GY
RAD ONC ARIA REFERENCE POINT ID: NORMAL

## 2023-10-18 PROCEDURE — 77014 PR CT GUIDE FOR PLACEMENT RADIATION THERAPY FIELDS: CPT | Mod: 26 | Performed by: STUDENT IN AN ORGANIZED HEALTH CARE EDUCATION/TRAINING PROGRAM

## 2023-10-18 PROCEDURE — 77014 HC CT GUIDE FOR PLACEMENT RADIATION THERAPY FIELDS: CPT | Performed by: STUDENT IN AN ORGANIZED HEALTH CARE EDUCATION/TRAINING PROGRAM

## 2023-10-18 PROCEDURE — 77386 HC IMRT TREATMENT DELIVERY, COMPLEX: CPT | Performed by: STUDENT IN AN ORGANIZED HEALTH CARE EDUCATION/TRAINING PROGRAM

## 2023-10-19 ENCOUNTER — APPOINTMENT (OUTPATIENT)
Dept: RADIATION ONCOLOGY | Facility: HOSPITAL | Age: 68
End: 2023-10-19
Payer: COMMERCIAL

## 2023-10-19 ENCOUNTER — RESULTS ONLY (OUTPATIENT)
Dept: RADIATION ONCOLOGY | Facility: HOSPITAL | Age: 68
End: 2023-10-19

## 2023-10-19 LAB
RAD ONC ARIA COURSE ID: NORMAL
RAD ONC ARIA COURSE LAST TREATMENT DATE: NORMAL
RAD ONC ARIA COURSE START DATE: NORMAL
RAD ONC ARIA COURSE TREATMENT ELAPSED DAYS: 23
RAD ONC ARIA FIRST TREATMENT DATE: NORMAL
RAD ONC ARIA PLAN FRACTIONS TREATED TO DATE: 10
RAD ONC ARIA PLAN ID: NORMAL
RAD ONC ARIA PLAN PRESCRIBED DOSE PER FRACTION: 2 GY
RAD ONC ARIA PLAN TOTAL FRACTIONS PRESCRIBED: 25
RAD ONC ARIA PLAN TOTAL PRESCRIBED DOSE: 5000 CGY
RAD ONC ARIA REFERENCE POINT DOSAGE GIVEN TO DATE: NORMAL GY
RAD ONC ARIA REFERENCE POINT ID: NORMAL

## 2023-10-19 PROCEDURE — 77014 HC CT GUIDE FOR PLACEMENT RADIATION THERAPY FIELDS: CPT | Performed by: STUDENT IN AN ORGANIZED HEALTH CARE EDUCATION/TRAINING PROGRAM

## 2023-10-19 PROCEDURE — 77336 RADIATION PHYSICS CONSULT: CPT | Performed by: STUDENT IN AN ORGANIZED HEALTH CARE EDUCATION/TRAINING PROGRAM

## 2023-10-19 PROCEDURE — 77386 HC IMRT TREATMENT DELIVERY, COMPLEX: CPT | Performed by: STUDENT IN AN ORGANIZED HEALTH CARE EDUCATION/TRAINING PROGRAM

## 2023-10-19 PROCEDURE — 77014 PR CT GUIDE FOR PLACEMENT RADIATION THERAPY FIELDS: CPT | Mod: 26 | Performed by: STUDENT IN AN ORGANIZED HEALTH CARE EDUCATION/TRAINING PROGRAM

## 2023-10-19 PROCEDURE — 77427 RADIATION TX MANAGEMENT X5: CPT | Performed by: STUDENT IN AN ORGANIZED HEALTH CARE EDUCATION/TRAINING PROGRAM

## 2023-10-20 ENCOUNTER — RESULTS ONLY (OUTPATIENT)
Dept: RADIATION ONCOLOGY | Facility: HOSPITAL | Age: 68
End: 2023-10-20

## 2023-10-20 ENCOUNTER — APPOINTMENT (OUTPATIENT)
Dept: RADIATION ONCOLOGY | Facility: HOSPITAL | Age: 68
End: 2023-10-20
Payer: COMMERCIAL

## 2023-10-20 LAB
RAD ONC ARIA COURSE ID: NORMAL
RAD ONC ARIA COURSE LAST TREATMENT DATE: NORMAL
RAD ONC ARIA COURSE START DATE: NORMAL
RAD ONC ARIA COURSE TREATMENT ELAPSED DAYS: 24
RAD ONC ARIA FIRST TREATMENT DATE: NORMAL
RAD ONC ARIA PLAN FRACTIONS TREATED TO DATE: 11
RAD ONC ARIA PLAN ID: NORMAL
RAD ONC ARIA PLAN PRESCRIBED DOSE PER FRACTION: 2 GY
RAD ONC ARIA PLAN TOTAL FRACTIONS PRESCRIBED: 25
RAD ONC ARIA PLAN TOTAL PRESCRIBED DOSE: 5000 CGY
RAD ONC ARIA REFERENCE POINT DOSAGE GIVEN TO DATE: NORMAL GY
RAD ONC ARIA REFERENCE POINT ID: NORMAL

## 2023-10-20 PROCEDURE — 77386 HC IMRT TREATMENT DELIVERY, COMPLEX: CPT | Performed by: STUDENT IN AN ORGANIZED HEALTH CARE EDUCATION/TRAINING PROGRAM

## 2023-10-20 PROCEDURE — 77014 PR CT GUIDE FOR PLACEMENT RADIATION THERAPY FIELDS: CPT | Mod: 26 | Performed by: STUDENT IN AN ORGANIZED HEALTH CARE EDUCATION/TRAINING PROGRAM

## 2023-10-20 PROCEDURE — 77014 HC CT GUIDE FOR PLACEMENT RADIATION THERAPY FIELDS: CPT | Performed by: STUDENT IN AN ORGANIZED HEALTH CARE EDUCATION/TRAINING PROGRAM

## 2023-10-23 ENCOUNTER — RESULTS ONLY (OUTPATIENT)
Dept: RADIATION ONCOLOGY | Facility: HOSPITAL | Age: 68
End: 2023-10-23

## 2023-10-23 ENCOUNTER — APPOINTMENT (OUTPATIENT)
Dept: RADIATION ONCOLOGY | Facility: HOSPITAL | Age: 68
End: 2023-10-23
Payer: COMMERCIAL

## 2023-10-23 LAB
RAD ONC ARIA COURSE ID: NORMAL
RAD ONC ARIA COURSE LAST TREATMENT DATE: NORMAL
RAD ONC ARIA COURSE START DATE: NORMAL
RAD ONC ARIA COURSE TREATMENT ELAPSED DAYS: 27
RAD ONC ARIA FIRST TREATMENT DATE: NORMAL
RAD ONC ARIA PLAN FRACTIONS TREATED TO DATE: 12
RAD ONC ARIA PLAN ID: NORMAL
RAD ONC ARIA PLAN PRESCRIBED DOSE PER FRACTION: 2 GY
RAD ONC ARIA PLAN TOTAL FRACTIONS PRESCRIBED: 25
RAD ONC ARIA PLAN TOTAL PRESCRIBED DOSE: 5000 CGY
RAD ONC ARIA REFERENCE POINT DOSAGE GIVEN TO DATE: NORMAL GY
RAD ONC ARIA REFERENCE POINT ID: NORMAL

## 2023-10-23 PROCEDURE — 77014 HC CT GUIDE FOR PLACEMENT RADIATION THERAPY FIELDS: CPT | Performed by: STUDENT IN AN ORGANIZED HEALTH CARE EDUCATION/TRAINING PROGRAM

## 2023-10-23 PROCEDURE — 77386 HC IMRT TREATMENT DELIVERY, COMPLEX: CPT | Performed by: STUDENT IN AN ORGANIZED HEALTH CARE EDUCATION/TRAINING PROGRAM

## 2023-10-23 PROCEDURE — 77014 PR CT GUIDE FOR PLACEMENT RADIATION THERAPY FIELDS: CPT | Mod: 26 | Performed by: STUDENT IN AN ORGANIZED HEALTH CARE EDUCATION/TRAINING PROGRAM

## 2023-10-24 ENCOUNTER — RESULTS ONLY (OUTPATIENT)
Dept: RADIATION ONCOLOGY | Facility: HOSPITAL | Age: 68
End: 2023-10-24

## 2023-10-24 ENCOUNTER — OFFICE VISIT (OUTPATIENT)
Dept: RADIATION ONCOLOGY | Facility: HOSPITAL | Age: 68
End: 2023-10-24
Payer: COMMERCIAL

## 2023-10-24 VITALS
DIASTOLIC BLOOD PRESSURE: 84 MMHG | WEIGHT: 150.6 LBS | RESPIRATION RATE: 19 BRPM | BODY MASS INDEX: 25.06 KG/M2 | OXYGEN SATURATION: 96 % | SYSTOLIC BLOOD PRESSURE: 132 MMHG | HEART RATE: 98 BPM | TEMPERATURE: 97.8 F

## 2023-10-24 DIAGNOSIS — C34.90 MALIGNANT NEOPLASM OF LUNG, UNSPECIFIED LATERALITY, UNSPECIFIED PART OF LUNG (H): Primary | ICD-10-CM

## 2023-10-24 LAB
RAD ONC ARIA COURSE ID: NORMAL
RAD ONC ARIA COURSE LAST TREATMENT DATE: NORMAL
RAD ONC ARIA COURSE START DATE: NORMAL
RAD ONC ARIA COURSE TREATMENT ELAPSED DAYS: 28
RAD ONC ARIA FIRST TREATMENT DATE: NORMAL
RAD ONC ARIA PLAN FRACTIONS TREATED TO DATE: 13
RAD ONC ARIA PLAN ID: NORMAL
RAD ONC ARIA PLAN PRESCRIBED DOSE PER FRACTION: 2 GY
RAD ONC ARIA PLAN TOTAL FRACTIONS PRESCRIBED: 25
RAD ONC ARIA PLAN TOTAL PRESCRIBED DOSE: 5000 CGY
RAD ONC ARIA REFERENCE POINT DOSAGE GIVEN TO DATE: NORMAL GY
RAD ONC ARIA REFERENCE POINT ID: NORMAL

## 2023-10-24 ASSESSMENT — PAIN SCALES - GENERAL: PAINLEVEL: NO PAIN (0)

## 2023-10-24 NOTE — PROGRESS NOTES
Progress Notes  Encounter Date: Oct 24, 2023  JAYA Mohr CNP     RADIATION ONCOLOGY WEEKLY MANAGEMENT PROGRESS NOTE     Patient Care Team         Relationship Specialty Notifications Start End    Annette William NP PCP - [facility]  10/22/19     Phone: [phone] Fax: [phone]         [facility] [address] [place] [address]    Annette William NP Assigned PCP   10/3/19     Phone: [phone] Fax: [phone]         [facility] [address] [place] [address]    Abimael Stacy MD Assigned Cancer Care Provider   9/30/23     Phone: [phone] Fax: [phone]         [address]  [place] [address]                    DIAGNOSIS:  Cancer Staging   No matching staging information was found for the patient.          RADIATION THERAPY:    Jaxson Ramirez has received 3600 cGy to date to Right lung.   Treatment 5/5  Re plan treatment 13/25  Total planned dose: 6000 cGy      SUBJECTIVE:    SOB continues when lying on his side but states it is no worse than last week.  Continues to use albuterol inhaler as needed and it is effective.  Denies new cough.  Fatigue improved from last week. Reports bilateral feet swelling.       OBJECTIVE:    WEIGHT: 150 lbs 9.6 oz. /84 (BP Location: Left arm, Patient Position: Chair, Cuff Size: Adult Regular)   Pulse 98   Temp 97.8  F (36.6  C) (Tympanic)   Resp 19   Wt 68.3 kg (150 lb 9.6 oz)   SpO2 96%   BMI 25.06 kg/m    Examination reveals an alert non ill appearing male patient, respirations non labored, no skin changes.        IMPRESSION:  Routine tolerance to radiation therapy.       PLAN:  Continue treatment as planned. Continue to use inh as needed/ prescribed. Elevate feet to help with swelling, discussed JUDE socks.       Medical record and imaging reviewed by covering locum provider.      JAYA Mohr CNP, MD  Radiation Oncologist

## 2023-10-25 ENCOUNTER — APPOINTMENT (OUTPATIENT)
Dept: RADIATION ONCOLOGY | Facility: HOSPITAL | Age: 68
End: 2023-10-25
Payer: COMMERCIAL

## 2023-10-25 ENCOUNTER — RESULTS ONLY (OUTPATIENT)
Dept: RADIATION ONCOLOGY | Facility: HOSPITAL | Age: 68
End: 2023-10-25

## 2023-10-25 LAB
RAD ONC ARIA COURSE ID: NORMAL
RAD ONC ARIA COURSE LAST TREATMENT DATE: NORMAL
RAD ONC ARIA COURSE START DATE: NORMAL
RAD ONC ARIA COURSE TREATMENT ELAPSED DAYS: 29
RAD ONC ARIA FIRST TREATMENT DATE: NORMAL
RAD ONC ARIA PLAN FRACTIONS TREATED TO DATE: 14
RAD ONC ARIA PLAN ID: NORMAL
RAD ONC ARIA PLAN PRESCRIBED DOSE PER FRACTION: 2 GY
RAD ONC ARIA PLAN TOTAL FRACTIONS PRESCRIBED: 25
RAD ONC ARIA PLAN TOTAL PRESCRIBED DOSE: 5000 CGY
RAD ONC ARIA REFERENCE POINT DOSAGE GIVEN TO DATE: NORMAL GY
RAD ONC ARIA REFERENCE POINT ID: NORMAL

## 2023-10-25 PROCEDURE — 77386 HC IMRT TREATMENT DELIVERY, COMPLEX: CPT | Performed by: STUDENT IN AN ORGANIZED HEALTH CARE EDUCATION/TRAINING PROGRAM

## 2023-10-25 PROCEDURE — 77014 PR CT GUIDE FOR PLACEMENT RADIATION THERAPY FIELDS: CPT | Mod: 26 | Performed by: STUDENT IN AN ORGANIZED HEALTH CARE EDUCATION/TRAINING PROGRAM

## 2023-10-25 PROCEDURE — 77014 HC CT GUIDE FOR PLACEMENT RADIATION THERAPY FIELDS: CPT | Performed by: STUDENT IN AN ORGANIZED HEALTH CARE EDUCATION/TRAINING PROGRAM

## 2023-10-26 ENCOUNTER — RESULTS ONLY (OUTPATIENT)
Dept: RADIATION ONCOLOGY | Facility: HOSPITAL | Age: 68
End: 2023-10-26

## 2023-10-26 ENCOUNTER — APPOINTMENT (OUTPATIENT)
Dept: RADIATION ONCOLOGY | Facility: HOSPITAL | Age: 68
End: 2023-10-26
Payer: COMMERCIAL

## 2023-10-26 LAB
RAD ONC ARIA COURSE ID: NORMAL
RAD ONC ARIA COURSE LAST TREATMENT DATE: NORMAL
RAD ONC ARIA COURSE START DATE: NORMAL
RAD ONC ARIA COURSE TREATMENT ELAPSED DAYS: 30
RAD ONC ARIA FIRST TREATMENT DATE: NORMAL
RAD ONC ARIA PLAN FRACTIONS TREATED TO DATE: 15
RAD ONC ARIA PLAN ID: NORMAL
RAD ONC ARIA PLAN PRESCRIBED DOSE PER FRACTION: 2 GY
RAD ONC ARIA PLAN TOTAL FRACTIONS PRESCRIBED: 25
RAD ONC ARIA PLAN TOTAL PRESCRIBED DOSE: 5000 CGY
RAD ONC ARIA REFERENCE POINT DOSAGE GIVEN TO DATE: NORMAL GY
RAD ONC ARIA REFERENCE POINT ID: NORMAL

## 2023-10-26 PROCEDURE — 77014 PR CT GUIDE FOR PLACEMENT RADIATION THERAPY FIELDS: CPT | Mod: 26 | Performed by: STUDENT IN AN ORGANIZED HEALTH CARE EDUCATION/TRAINING PROGRAM

## 2023-10-26 PROCEDURE — 77386 HC IMRT TREATMENT DELIVERY, COMPLEX: CPT | Performed by: STUDENT IN AN ORGANIZED HEALTH CARE EDUCATION/TRAINING PROGRAM

## 2023-10-26 PROCEDURE — 77014 HC CT GUIDE FOR PLACEMENT RADIATION THERAPY FIELDS: CPT | Performed by: STUDENT IN AN ORGANIZED HEALTH CARE EDUCATION/TRAINING PROGRAM

## 2023-10-26 PROCEDURE — 77336 RADIATION PHYSICS CONSULT: CPT | Performed by: STUDENT IN AN ORGANIZED HEALTH CARE EDUCATION/TRAINING PROGRAM

## 2023-10-26 PROCEDURE — 77427 RADIATION TX MANAGEMENT X5: CPT | Performed by: STUDENT IN AN ORGANIZED HEALTH CARE EDUCATION/TRAINING PROGRAM

## 2023-10-27 ENCOUNTER — APPOINTMENT (OUTPATIENT)
Dept: RADIATION ONCOLOGY | Facility: HOSPITAL | Age: 68
End: 2023-10-27
Payer: COMMERCIAL

## 2023-10-27 ENCOUNTER — RESULTS ONLY (OUTPATIENT)
Dept: RADIATION ONCOLOGY | Facility: HOSPITAL | Age: 68
End: 2023-10-27

## 2023-10-27 LAB
RAD ONC ARIA COURSE ID: NORMAL
RAD ONC ARIA COURSE LAST TREATMENT DATE: NORMAL
RAD ONC ARIA COURSE START DATE: NORMAL
RAD ONC ARIA COURSE TREATMENT ELAPSED DAYS: 31
RAD ONC ARIA FIRST TREATMENT DATE: NORMAL
RAD ONC ARIA PLAN FRACTIONS TREATED TO DATE: 16
RAD ONC ARIA PLAN ID: NORMAL
RAD ONC ARIA PLAN PRESCRIBED DOSE PER FRACTION: 2 GY
RAD ONC ARIA PLAN TOTAL FRACTIONS PRESCRIBED: 25
RAD ONC ARIA PLAN TOTAL PRESCRIBED DOSE: 5000 CGY
RAD ONC ARIA REFERENCE POINT DOSAGE GIVEN TO DATE: NORMAL GY
RAD ONC ARIA REFERENCE POINT ID: NORMAL

## 2023-10-27 PROCEDURE — 77386 HC IMRT TREATMENT DELIVERY, COMPLEX: CPT | Performed by: STUDENT IN AN ORGANIZED HEALTH CARE EDUCATION/TRAINING PROGRAM

## 2023-10-27 PROCEDURE — 77014 HC CT GUIDE FOR PLACEMENT RADIATION THERAPY FIELDS: CPT | Performed by: STUDENT IN AN ORGANIZED HEALTH CARE EDUCATION/TRAINING PROGRAM

## 2023-10-27 PROCEDURE — 77014 PR CT GUIDE FOR PLACEMENT RADIATION THERAPY FIELDS: CPT | Mod: 26 | Performed by: STUDENT IN AN ORGANIZED HEALTH CARE EDUCATION/TRAINING PROGRAM

## 2023-10-30 ENCOUNTER — RESULTS ONLY (OUTPATIENT)
Dept: RADIATION ONCOLOGY | Facility: HOSPITAL | Age: 68
End: 2023-10-30

## 2023-10-30 ENCOUNTER — APPOINTMENT (OUTPATIENT)
Dept: RADIATION ONCOLOGY | Facility: HOSPITAL | Age: 68
End: 2023-10-30
Payer: COMMERCIAL

## 2023-10-30 LAB
RAD ONC ARIA COURSE ID: NORMAL
RAD ONC ARIA COURSE LAST TREATMENT DATE: NORMAL
RAD ONC ARIA COURSE START DATE: NORMAL
RAD ONC ARIA COURSE TREATMENT ELAPSED DAYS: 34
RAD ONC ARIA FIRST TREATMENT DATE: NORMAL
RAD ONC ARIA PLAN FRACTIONS TREATED TO DATE: 17
RAD ONC ARIA PLAN ID: NORMAL
RAD ONC ARIA PLAN PRESCRIBED DOSE PER FRACTION: 2 GY
RAD ONC ARIA PLAN TOTAL FRACTIONS PRESCRIBED: 25
RAD ONC ARIA PLAN TOTAL PRESCRIBED DOSE: 5000 CGY
RAD ONC ARIA REFERENCE POINT DOSAGE GIVEN TO DATE: NORMAL GY
RAD ONC ARIA REFERENCE POINT ID: NORMAL

## 2023-10-30 PROCEDURE — 77014 HC CT GUIDE FOR PLACEMENT RADIATION THERAPY FIELDS: CPT | Performed by: STUDENT IN AN ORGANIZED HEALTH CARE EDUCATION/TRAINING PROGRAM

## 2023-10-30 PROCEDURE — 77386 HC IMRT TREATMENT DELIVERY, COMPLEX: CPT | Performed by: STUDENT IN AN ORGANIZED HEALTH CARE EDUCATION/TRAINING PROGRAM

## 2023-10-30 PROCEDURE — 77014 PR CT GUIDE FOR PLACEMENT RADIATION THERAPY FIELDS: CPT | Mod: 26 | Performed by: STUDENT IN AN ORGANIZED HEALTH CARE EDUCATION/TRAINING PROGRAM

## 2023-10-31 ENCOUNTER — RESULTS ONLY (OUTPATIENT)
Dept: RADIATION ONCOLOGY | Facility: HOSPITAL | Age: 68
End: 2023-10-31

## 2023-10-31 ENCOUNTER — OFFICE VISIT (OUTPATIENT)
Dept: RADIATION ONCOLOGY | Facility: HOSPITAL | Age: 68
End: 2023-10-31

## 2023-10-31 VITALS
WEIGHT: 150.2 LBS | DIASTOLIC BLOOD PRESSURE: 72 MMHG | HEART RATE: 99 BPM | RESPIRATION RATE: 19 BRPM | BODY MASS INDEX: 24.99 KG/M2 | SYSTOLIC BLOOD PRESSURE: 128 MMHG | TEMPERATURE: 98.2 F | OXYGEN SATURATION: 95 %

## 2023-10-31 DIAGNOSIS — C34.90 MALIGNANT NEOPLASM OF LUNG, UNSPECIFIED LATERALITY, UNSPECIFIED PART OF LUNG (H): Primary | ICD-10-CM

## 2023-10-31 LAB
RAD ONC ARIA COURSE ID: NORMAL
RAD ONC ARIA COURSE LAST TREATMENT DATE: NORMAL
RAD ONC ARIA COURSE START DATE: NORMAL
RAD ONC ARIA COURSE TREATMENT ELAPSED DAYS: 35
RAD ONC ARIA FIRST TREATMENT DATE: NORMAL
RAD ONC ARIA PLAN FRACTIONS TREATED TO DATE: 18
RAD ONC ARIA PLAN ID: NORMAL
RAD ONC ARIA PLAN PRESCRIBED DOSE PER FRACTION: 2 GY
RAD ONC ARIA PLAN TOTAL FRACTIONS PRESCRIBED: 25
RAD ONC ARIA PLAN TOTAL PRESCRIBED DOSE: 5000 CGY
RAD ONC ARIA REFERENCE POINT DOSAGE GIVEN TO DATE: NORMAL GY
RAD ONC ARIA REFERENCE POINT ID: NORMAL

## 2023-10-31 PROCEDURE — 77427 RADIATION TX MANAGEMENT X5: CPT | Performed by: STUDENT IN AN ORGANIZED HEALTH CARE EDUCATION/TRAINING PROGRAM

## 2023-10-31 ASSESSMENT — PAIN SCALES - GENERAL: PAINLEVEL: NO PAIN (0)

## 2023-10-31 NOTE — PROGRESS NOTES
"Progress Notes  Encounter Date: Oct 31, 2023  JAYA Mohr CNP  RADIATION ONCOLOGY WEEKLY MANAGEMENT PROGRESS NOTE     Patient Care Team         Relationship Specialty Notifications Start End    Annette Wililam NP PCP - [facility]  10/22/19     Phone: [phone] Fax: [phone]         [facility] [address] [place] [address]    Annette William NP Assigned PCP   10/3/19     Phone: [phone] Fax: [phone]         [facility] [address] [place] [address]    Abimael Stacy MD Assigned Cancer Care Provider   9/30/23     Phone: [phone] Fax: [phone]         [address] [facility] [place] [address]                    DIAGNOSIS:  Cancer Staging   No matching staging information was found for the patient.          RADIATION THERAPY:    Jaxson Ramirez has received 4600 cGy to date to Right lung.   Treatment 5/5  Re plan treatment 18/25  Total planned dose: 6000 cGy      SUBJECTIVE:    SOB continues when lying on his side but states it is no worse than last week.  Continues to use albuterol inhaler as needed and it is effective.  Denies new cough.  Fatigue stable.  Reports bilateral feet swelling continues and is unchanged from last week, he has not tried the compression socks yet. Also reports a \"pimple like rash bilaterally below the nipple line\".       OBJECTIVE:    WEIGHT: 150 lbs 3.2 oz. /72 (BP Location: Right arm, Patient Position: Chair, Cuff Size: Adult Regular)   Pulse 99   Temp 98.2  F (36.8  C) (Tympanic)   Resp 19   Wt 68.1 kg (150 lb 3.2 oz)   SpO2 95%   BMI 24.99 kg/m    Examination reveals an alert non ill appearing male patient, respirations non labored, no skin changes.        IMPRESSION:  Routine tolerance to radiation therapy.       PLAN:  Continue treatment as planned. Continue to use inh as needed/ prescribed. Elevate feet to help with swelling, discussed JUDE socks.       Medical record and imaging reviewed by covering locum " provider.      JAYA Mohr CNP        Adi Collier MD  Radiation Oncologist

## 2023-11-01 ENCOUNTER — APPOINTMENT (OUTPATIENT)
Dept: RADIATION ONCOLOGY | Facility: HOSPITAL | Age: 68
End: 2023-11-01
Payer: COMMERCIAL

## 2023-11-01 ENCOUNTER — RESULTS ONLY (OUTPATIENT)
Dept: RADIATION ONCOLOGY | Facility: HOSPITAL | Age: 68
End: 2023-11-01

## 2023-11-01 LAB
RAD ONC ARIA COURSE ID: NORMAL
RAD ONC ARIA COURSE LAST TREATMENT DATE: NORMAL
RAD ONC ARIA COURSE START DATE: NORMAL
RAD ONC ARIA COURSE TREATMENT ELAPSED DAYS: 36
RAD ONC ARIA FIRST TREATMENT DATE: NORMAL
RAD ONC ARIA PLAN FRACTIONS TREATED TO DATE: 19
RAD ONC ARIA PLAN ID: NORMAL
RAD ONC ARIA PLAN PRESCRIBED DOSE PER FRACTION: 2 GY
RAD ONC ARIA PLAN TOTAL FRACTIONS PRESCRIBED: 25
RAD ONC ARIA PLAN TOTAL PRESCRIBED DOSE: 5000 CGY
RAD ONC ARIA REFERENCE POINT DOSAGE GIVEN TO DATE: NORMAL GY
RAD ONC ARIA REFERENCE POINT ID: NORMAL

## 2023-11-01 PROCEDURE — 77386 HC IMRT TREATMENT DELIVERY, COMPLEX: CPT | Performed by: STUDENT IN AN ORGANIZED HEALTH CARE EDUCATION/TRAINING PROGRAM

## 2023-11-01 PROCEDURE — 77014 HC CT GUIDE FOR PLACEMENT RADIATION THERAPY FIELDS: CPT | Performed by: STUDENT IN AN ORGANIZED HEALTH CARE EDUCATION/TRAINING PROGRAM

## 2023-11-01 PROCEDURE — 77014 PR CT GUIDE FOR PLACEMENT RADIATION THERAPY FIELDS: CPT | Mod: 26 | Performed by: STUDENT IN AN ORGANIZED HEALTH CARE EDUCATION/TRAINING PROGRAM

## 2023-11-02 ENCOUNTER — APPOINTMENT (OUTPATIENT)
Dept: RADIATION ONCOLOGY | Facility: HOSPITAL | Age: 68
End: 2023-11-02
Payer: COMMERCIAL

## 2023-11-02 ENCOUNTER — RESULTS ONLY (OUTPATIENT)
Dept: RADIATION ONCOLOGY | Facility: HOSPITAL | Age: 68
End: 2023-11-02

## 2023-11-02 LAB
RAD ONC ARIA COURSE ID: NORMAL
RAD ONC ARIA COURSE LAST TREATMENT DATE: NORMAL
RAD ONC ARIA COURSE START DATE: NORMAL
RAD ONC ARIA COURSE TREATMENT ELAPSED DAYS: 37
RAD ONC ARIA FIRST TREATMENT DATE: NORMAL
RAD ONC ARIA PLAN FRACTIONS TREATED TO DATE: 20
RAD ONC ARIA PLAN ID: NORMAL
RAD ONC ARIA PLAN PRESCRIBED DOSE PER FRACTION: 2 GY
RAD ONC ARIA PLAN TOTAL FRACTIONS PRESCRIBED: 25
RAD ONC ARIA PLAN TOTAL PRESCRIBED DOSE: 5000 CGY
RAD ONC ARIA REFERENCE POINT DOSAGE GIVEN TO DATE: NORMAL GY
RAD ONC ARIA REFERENCE POINT ID: NORMAL

## 2023-11-02 PROCEDURE — 77336 RADIATION PHYSICS CONSULT: CPT | Performed by: STUDENT IN AN ORGANIZED HEALTH CARE EDUCATION/TRAINING PROGRAM

## 2023-11-02 PROCEDURE — 77014 PR CT GUIDE FOR PLACEMENT RADIATION THERAPY FIELDS: CPT | Mod: 26 | Performed by: STUDENT IN AN ORGANIZED HEALTH CARE EDUCATION/TRAINING PROGRAM

## 2023-11-02 PROCEDURE — 77014 HC CT GUIDE FOR PLACEMENT RADIATION THERAPY FIELDS: CPT | Performed by: STUDENT IN AN ORGANIZED HEALTH CARE EDUCATION/TRAINING PROGRAM

## 2023-11-02 PROCEDURE — 77386 HC IMRT TREATMENT DELIVERY, COMPLEX: CPT | Performed by: STUDENT IN AN ORGANIZED HEALTH CARE EDUCATION/TRAINING PROGRAM

## 2023-11-03 ENCOUNTER — APPOINTMENT (OUTPATIENT)
Dept: RADIATION ONCOLOGY | Facility: HOSPITAL | Age: 68
End: 2023-11-03
Payer: COMMERCIAL

## 2023-11-03 ENCOUNTER — RESULTS ONLY (OUTPATIENT)
Dept: RADIATION ONCOLOGY | Facility: HOSPITAL | Age: 68
End: 2023-11-03

## 2023-11-03 DIAGNOSIS — K20.80 RADIATION-INDUCED ESOPHAGITIS: Primary | ICD-10-CM

## 2023-11-03 DIAGNOSIS — K20.80 RADIATION-INDUCED ESOPHAGITIS: ICD-10-CM

## 2023-11-03 DIAGNOSIS — C34.90 MALIGNANT NEOPLASM OF LUNG, UNSPECIFIED LATERALITY, UNSPECIFIED PART OF LUNG (H): Primary | ICD-10-CM

## 2023-11-03 DIAGNOSIS — T66.XXXA RADIATION-INDUCED ESOPHAGITIS: ICD-10-CM

## 2023-11-03 DIAGNOSIS — T66.XXXA RADIATION-INDUCED ESOPHAGITIS: Primary | ICD-10-CM

## 2023-11-03 LAB
RAD ONC ARIA COURSE ID: NORMAL
RAD ONC ARIA COURSE LAST TREATMENT DATE: NORMAL
RAD ONC ARIA COURSE START DATE: NORMAL
RAD ONC ARIA COURSE TREATMENT ELAPSED DAYS: 38
RAD ONC ARIA FIRST TREATMENT DATE: NORMAL
RAD ONC ARIA PLAN FRACTIONS TREATED TO DATE: 21
RAD ONC ARIA PLAN ID: NORMAL
RAD ONC ARIA PLAN PRESCRIBED DOSE PER FRACTION: 2 GY
RAD ONC ARIA PLAN TOTAL FRACTIONS PRESCRIBED: 25
RAD ONC ARIA PLAN TOTAL PRESCRIBED DOSE: 5000 CGY
RAD ONC ARIA REFERENCE POINT DOSAGE GIVEN TO DATE: NORMAL GY
RAD ONC ARIA REFERENCE POINT ID: NORMAL

## 2023-11-03 PROCEDURE — 77386 HC IMRT TREATMENT DELIVERY, COMPLEX: CPT | Performed by: STUDENT IN AN ORGANIZED HEALTH CARE EDUCATION/TRAINING PROGRAM

## 2023-11-03 PROCEDURE — 77014 PR CT GUIDE FOR PLACEMENT RADIATION THERAPY FIELDS: CPT | Mod: 26 | Performed by: STUDENT IN AN ORGANIZED HEALTH CARE EDUCATION/TRAINING PROGRAM

## 2023-11-03 PROCEDURE — 77014 HC CT GUIDE FOR PLACEMENT RADIATION THERAPY FIELDS: CPT | Performed by: STUDENT IN AN ORGANIZED HEALTH CARE EDUCATION/TRAINING PROGRAM

## 2023-11-06 ENCOUNTER — RESULTS ONLY (OUTPATIENT)
Dept: RADIATION ONCOLOGY | Facility: HOSPITAL | Age: 68
End: 2023-11-06

## 2023-11-06 ENCOUNTER — APPOINTMENT (OUTPATIENT)
Dept: RADIATION ONCOLOGY | Facility: HOSPITAL | Age: 68
End: 2023-11-06
Payer: COMMERCIAL

## 2023-11-06 LAB
RAD ONC ARIA COURSE ID: NORMAL
RAD ONC ARIA COURSE LAST TREATMENT DATE: NORMAL
RAD ONC ARIA COURSE START DATE: NORMAL
RAD ONC ARIA COURSE TREATMENT ELAPSED DAYS: 41
RAD ONC ARIA FIRST TREATMENT DATE: NORMAL
RAD ONC ARIA PLAN FRACTIONS TREATED TO DATE: 22
RAD ONC ARIA PLAN ID: NORMAL
RAD ONC ARIA PLAN PRESCRIBED DOSE PER FRACTION: 2 GY
RAD ONC ARIA PLAN TOTAL FRACTIONS PRESCRIBED: 25
RAD ONC ARIA PLAN TOTAL PRESCRIBED DOSE: 5000 CGY
RAD ONC ARIA REFERENCE POINT DOSAGE GIVEN TO DATE: NORMAL GY
RAD ONC ARIA REFERENCE POINT ID: NORMAL

## 2023-11-06 PROCEDURE — 77014 HC CT GUIDE FOR PLACEMENT RADIATION THERAPY FIELDS: CPT | Performed by: STUDENT IN AN ORGANIZED HEALTH CARE EDUCATION/TRAINING PROGRAM

## 2023-11-06 PROCEDURE — 77014 PR CT GUIDE FOR PLACEMENT RADIATION THERAPY FIELDS: CPT | Mod: 26 | Performed by: STUDENT IN AN ORGANIZED HEALTH CARE EDUCATION/TRAINING PROGRAM

## 2023-11-06 PROCEDURE — 77386 HC IMRT TREATMENT DELIVERY, COMPLEX: CPT | Performed by: STUDENT IN AN ORGANIZED HEALTH CARE EDUCATION/TRAINING PROGRAM

## 2023-11-07 ENCOUNTER — OFFICE VISIT (OUTPATIENT)
Dept: RADIATION ONCOLOGY | Facility: HOSPITAL | Age: 68
End: 2023-11-07
Payer: COMMERCIAL

## 2023-11-07 ENCOUNTER — RESULTS ONLY (OUTPATIENT)
Dept: RADIATION ONCOLOGY | Facility: HOSPITAL | Age: 68
End: 2023-11-07

## 2023-11-07 VITALS
RESPIRATION RATE: 22 BRPM | SYSTOLIC BLOOD PRESSURE: 110 MMHG | OXYGEN SATURATION: 99 % | WEIGHT: 147.5 LBS | DIASTOLIC BLOOD PRESSURE: 64 MMHG | TEMPERATURE: 98.1 F | BODY MASS INDEX: 24.55 KG/M2 | HEART RATE: 75 BPM

## 2023-11-07 DIAGNOSIS — C34.90 MALIGNANT NEOPLASM OF LUNG, UNSPECIFIED LATERALITY, UNSPECIFIED PART OF LUNG (H): Primary | ICD-10-CM

## 2023-11-07 LAB
RAD ONC ARIA COURSE ID: NORMAL
RAD ONC ARIA COURSE LAST TREATMENT DATE: NORMAL
RAD ONC ARIA COURSE START DATE: NORMAL
RAD ONC ARIA COURSE TREATMENT ELAPSED DAYS: 42
RAD ONC ARIA FIRST TREATMENT DATE: NORMAL
RAD ONC ARIA PLAN FRACTIONS TREATED TO DATE: 23
RAD ONC ARIA PLAN ID: NORMAL
RAD ONC ARIA PLAN PRESCRIBED DOSE PER FRACTION: 2 GY
RAD ONC ARIA PLAN TOTAL FRACTIONS PRESCRIBED: 25
RAD ONC ARIA PLAN TOTAL PRESCRIBED DOSE: 5000 CGY
RAD ONC ARIA REFERENCE POINT DOSAGE GIVEN TO DATE: NORMAL GY
RAD ONC ARIA REFERENCE POINT ID: NORMAL

## 2023-11-07 ASSESSMENT — PAIN SCALES - GENERAL: PAINLEVEL: NO PAIN (0)

## 2023-11-07 NOTE — PROGRESS NOTES
Progress Notes  Encounter Date: Nov 7, 2023  JAYA Mohr CNP  RADIATION ONCOLOGY WEEKLY MANAGEMENT PROGRESS NOTE     Patient Care Team         Relationship Specialty Notifications Start End    Annette William NP PCP - [facility]  10/22/19     Phone: [phone] Fax: [phone]         [facility] [address] [place] [address]    Annette William NP Assigned PCP   10/3/19     Phone: [phone] Fax: [phone]         [facility] [address] [place] [address]    Abimael Stacy MD Assigned Cancer Care Provider   9/30/23     Phone: [phone] Fax: [phone]         [address]  [place] [address]                    DIAGNOSIS:  Cancer Staging   No matching staging information was found for the patient.          RADIATION THERAPY:    Jaxson Ramirez has received 5600 cGy to date to Right lung.   Treatment 5/5  Re plan treatment 23/25  Total planned dose: 6000 cGy      SUBJECTIVE:    SOB stable.  Continues to use albuterol inhaler as needed and it is effective.  Denies new cough.  Fatigue stable.  Bilateral feet swelling continues and is unchanged from last week. Difficulty swallowing over the weekend. MMW not helpful.       OBJECTIVE:    WEIGHT: 147 lbs 8 oz. /64 (BP Location: Left arm, Patient Position: Chair, Cuff Size: Adult Regular)   Pulse 75   Temp 98.1  F (36.7  C) (Tympanic)   Resp 22   Wt 66.9 kg (147 lb 8 oz)   SpO2 99%   BMI 24.55 kg/m    Examination reveals an alert non ill appearing male patient, respirations non labored, no skin changes.        IMPRESSION:  Routine tolerance to radiation therapy.       PLAN:  Continue treatment as planned. Continue to use inh as needed/ prescribed. Completes XRT this week, follow up with medical oncology       Medical record and imaging reviewed by covering locum provider.      JAYA Mohr CNP, MD  Radiation Oncologist

## 2023-11-08 ENCOUNTER — RESULTS ONLY (OUTPATIENT)
Dept: RADIATION ONCOLOGY | Facility: HOSPITAL | Age: 68
End: 2023-11-08

## 2023-11-08 ENCOUNTER — APPOINTMENT (OUTPATIENT)
Dept: RADIATION ONCOLOGY | Facility: HOSPITAL | Age: 68
End: 2023-11-08
Payer: COMMERCIAL

## 2023-11-08 LAB
RAD ONC ARIA COURSE ID: NORMAL
RAD ONC ARIA COURSE LAST TREATMENT DATE: NORMAL
RAD ONC ARIA COURSE START DATE: NORMAL
RAD ONC ARIA COURSE TREATMENT ELAPSED DAYS: 43
RAD ONC ARIA FIRST TREATMENT DATE: NORMAL
RAD ONC ARIA PLAN FRACTIONS TREATED TO DATE: 24
RAD ONC ARIA PLAN ID: NORMAL
RAD ONC ARIA PLAN PRESCRIBED DOSE PER FRACTION: 2 GY
RAD ONC ARIA PLAN TOTAL FRACTIONS PRESCRIBED: 25
RAD ONC ARIA PLAN TOTAL PRESCRIBED DOSE: 5000 CGY
RAD ONC ARIA REFERENCE POINT DOSAGE GIVEN TO DATE: NORMAL GY
RAD ONC ARIA REFERENCE POINT ID: NORMAL

## 2023-11-08 PROCEDURE — 77014 PR CT GUIDE FOR PLACEMENT RADIATION THERAPY FIELDS: CPT | Mod: 26 | Performed by: STUDENT IN AN ORGANIZED HEALTH CARE EDUCATION/TRAINING PROGRAM

## 2023-11-08 PROCEDURE — 77386 HC IMRT TREATMENT DELIVERY, COMPLEX: CPT | Performed by: STUDENT IN AN ORGANIZED HEALTH CARE EDUCATION/TRAINING PROGRAM

## 2023-11-08 PROCEDURE — 77014 HC CT GUIDE FOR PLACEMENT RADIATION THERAPY FIELDS: CPT | Performed by: STUDENT IN AN ORGANIZED HEALTH CARE EDUCATION/TRAINING PROGRAM

## 2023-11-09 ENCOUNTER — APPOINTMENT (OUTPATIENT)
Dept: RADIATION ONCOLOGY | Facility: HOSPITAL | Age: 68
End: 2023-11-09
Payer: COMMERCIAL

## 2023-11-09 ENCOUNTER — RESULTS ONLY (OUTPATIENT)
Dept: RADIATION ONCOLOGY | Facility: HOSPITAL | Age: 68
End: 2023-11-09

## 2023-11-09 LAB
RAD ONC ARIA COURSE ID: NORMAL
RAD ONC ARIA COURSE LAST TREATMENT DATE: NORMAL
RAD ONC ARIA COURSE START DATE: NORMAL
RAD ONC ARIA COURSE TREATMENT ELAPSED DAYS: 44
RAD ONC ARIA FIRST TREATMENT DATE: NORMAL
RAD ONC ARIA PLAN FRACTIONS TREATED TO DATE: 25
RAD ONC ARIA PLAN ID: NORMAL
RAD ONC ARIA PLAN PRESCRIBED DOSE PER FRACTION: 2 GY
RAD ONC ARIA PLAN TOTAL FRACTIONS PRESCRIBED: 25
RAD ONC ARIA PLAN TOTAL PRESCRIBED DOSE: 5000 CGY
RAD ONC ARIA REFERENCE POINT DOSAGE GIVEN TO DATE: NORMAL GY
RAD ONC ARIA REFERENCE POINT ID: NORMAL

## 2023-11-09 PROCEDURE — 77014 HC CT GUIDE FOR PLACEMENT RADIATION THERAPY FIELDS: CPT | Performed by: STUDENT IN AN ORGANIZED HEALTH CARE EDUCATION/TRAINING PROGRAM

## 2023-11-09 PROCEDURE — 77336 RADIATION PHYSICS CONSULT: CPT | Performed by: STUDENT IN AN ORGANIZED HEALTH CARE EDUCATION/TRAINING PROGRAM

## 2023-11-09 PROCEDURE — 77014 PR CT GUIDE FOR PLACEMENT RADIATION THERAPY FIELDS: CPT | Mod: 26 | Performed by: STUDENT IN AN ORGANIZED HEALTH CARE EDUCATION/TRAINING PROGRAM

## 2023-11-09 PROCEDURE — 77386 HC IMRT TREATMENT DELIVERY, COMPLEX: CPT | Performed by: STUDENT IN AN ORGANIZED HEALTH CARE EDUCATION/TRAINING PROGRAM

## 2023-11-09 PROCEDURE — 77427 RADIATION TX MANAGEMENT X5: CPT | Performed by: STUDENT IN AN ORGANIZED HEALTH CARE EDUCATION/TRAINING PROGRAM

## 2023-11-10 ENCOUNTER — DOCUMENTATION ONLY (OUTPATIENT)
Dept: RADIATION ONCOLOGY | Facility: HOSPITAL | Age: 68
End: 2023-11-10

## 2023-11-10 DIAGNOSIS — C34.90 MALIGNANT NEOPLASM OF LUNG, UNSPECIFIED LATERALITY, UNSPECIFIED PART OF LUNG (H): Primary | ICD-10-CM

## 2023-11-10 NOTE — PROGRESS NOTES
Jaxsonprincess Ramirez  Gender: male  : 1955  Medical Record: [id number]  Primary Care Provider: Annette William    REFERRING PHYSICIANS: Dr. Fuentes     DIAGNOSIS: Malignant neoplasm of lung, unspecified laterality, unspecified part of lung (H)    TREATMENT INTENT: Curative   AREA TREATED: Right lung   PRIMARY TREATMENT TECHNIQUE: VMAT  ENERGY: 6X   TUMOR DOSE: 6000 cGy  NUMBER OF TREATMENTS: 30    TOTAL NUMBER OF TREATMENTS: 30  TOTAL DOSE: 6000 cGy  ELAPSED CALENDAR DAYS: 43  COMPLETION DATE: 2023       Jo Ann Camacho DNP, APRN, FNP-C   Department of Radiation Oncology

## 2023-11-22 ENCOUNTER — TELEPHONE (OUTPATIENT)
Dept: RADIATION ONCOLOGY | Facility: HOSPITAL | Age: 68
End: 2023-11-22
Payer: COMMERCIAL

## 2023-11-22 NOTE — TELEPHONE ENCOUNTER
Completed radiation therapy for lung cancer on 11/9/23.  Follow up call made and spoke with patient.  He reports his esophagitis is improving, but he is still having a hard time drinking carbonated beverages.  He will be having a CT scan soon and seeing Dr. Fuentes for follow up.  He has no questions or concerns.      Cheryle Gamino RN

## 2024-04-05 ENCOUNTER — TELEPHONE (OUTPATIENT)
Dept: FAMILY MEDICINE | Facility: OTHER | Age: 69
End: 2024-04-05

## 2024-04-05 NOTE — TELEPHONE ENCOUNTER
Attempt # 1  Outcome: Talked with Patient    Comment: Patient is no longer a patient of the [facility] he see Sathish Bocanegra at [facility]

## 2024-07-31 ENCOUNTER — TELEPHONE (OUTPATIENT)
Dept: RADIATION ONCOLOGY | Facility: HOSPITAL | Age: 69
End: 2024-07-31

## 2024-08-07 NOTE — PROGRESS NOTES
[facility]    RADIATION ONCOLOGY CONSULTATION      Jaxson Ramirez  is referred by  ref. provider found for an oncology consultation.    PRIMARY PHYSICIAN: Sathish Bocanegra     Cancer Staging   No matching staging information was found for the patient.      IMPRESSION: Mr. Jaxson Ramirez is a 69-year-old white male with a long history of smoking who was found to have a small right lower lobe pulmonary nodule on a lung screening study in October 2021.  A follow-up CT was done on 4/19/2022 and this showed a 3.8 x 3 cm mass in the right lower lobe and there was concern that this could be postobstructive due to a tumor.  A bronchoscopy was carried out on 7/18/2022 and brushings and biopsies were positive for non-small cell carcinoma, favor adenocarcinoma.  A PET scan was done which showed a large right lower lobe mass with FDG avid mediastinal lymph node.  The patient then underwent a bronchoscopy with EBUS and biopsy of the suspicious node was negative for malignancy.  He then underwent a right middle and lower lobectomy with mediastinal lymph node sampling.  The pathology demonstrated a 12 cm poorly differentiated non-small cell carcinoma with LVI and apparently her margins positive but these were at the visceral pleural area.  6 lymph nodes were negative.  He subsequently decided to receive adjuvant chemotherapy following some delay.  He was treated with cisplatin and pemetrexed for 4 cycles completing therapy in April 2023.  He was then started on adjuvant pembrolizumab but a chest CT in July 2023 showed a 3.1 cm area of nodularity at the right lung base in the biopsy showed this to be consistent with carcinoma.  PET scan was then done which showed metabolically active right pleural nodularity consistent with recurrent disease.  He received 60 Gy in 30 fractions with concurrent carboplatin and Taxol.  He then had a follow-up CT scan in December 2023 which showed good response of tumor with no evidence of  progression.  He was started on Durvalumab in January 2024.  CT scan done in July of this year showed a new 1.7 x 1.2 cm new lung mass in the lingula.  A PET scan was done which showed a single hypermetabolic mass in the lingula but no other evidence of disease.  He has been referred to radiation oncology for evaluation and consideration of treatment.  I had a long discussion with the patient regarding his new finding.  I discussed that this could possibly be recurrent from his previous disease or it could represent a new primary.  I feel that either way a biopsy would not be necessary and that should be managed with SBRT.  This is a new lesion and is PET avid therefore this almost certainly represents metastatic disease or a new primary.  We have outlined a course of radiation therapy.  The lesion is somewhat close to the heart and hopefully he can be treated with 48 Gray in 4 fractions or 50 Gy in 5 fractions and meet constraints.  If not we will need to modify the fractionation to come up with ischemic and adequately treat the tumor and spare normal tissues.  The risk and benefits of treatment including the possibility of pneumonitis have been discussed in detail with the patient he is ready to proceed.  PLAN: Proceed with simulation and initiation of treatment planning with treatment as outlined above.    No orders of the defined types were placed in this encounter.     ______________________________________________________________________  HISTORY OF PRESENT ILLNESS: Jaxson is a 69-year-old male patient who presents for radiation therapy consultation with a new 1.7 cm left upper lobe lung nodule, this is believed to be the only site of progression at this time.  Patient was recently treated for poorly differentiated non-small cell carcinoma of the right lower lobe in which he completed 6000 cGy between the dates of 10/5/2023 through 11/9/2023 with concurrent chemotherapy.  Patient does postoperative from a  bilobectomy of the right lower and middle lobes completed in November 2022.  History as below.    8/9/2022 PET/CT skull base to midthigh: There is a right lower lobe lung mass with partially necrotic appearance with nodular areas of abnormal uptake consistent with known primary lung cancer.  There is a small mediastinal lymph node with mild focal uptake concerning for metastatic disease.    11/29/2022 Lung, right lower and middle lobes, bilobectomy: Poorly differentiated non-small cell carcinoma.  Lymph node 10 R, 4R, 7, 8, excision 0/6 lymph nodes negative for malignancy.    2/6/2023-4/10/2023 patient received 4 cycles of cisplatin and pemetrexed    4/14/2023 CT chest with contrast: Stable postoperative changes of partial right lung resection.  No new chest mass or lymphadenopathy.    7/17/2023 CT chest with contrast: There is a 3.1 cm nodularity at the very right lung base.    8/7/2023 right lung needle biopsy: Malignancy identified, consistent with carcinoma    8/15/2023 medical oncology visit: Pending results of PET scan, plan for definitive intent concurrent chemotherapy and radiation or should there be distant disease plan would be palliative intent chemotherapy.  We will hold pembrolizumab    8/30/2023 PET/CT: Metabolically active right pleural nodularity compatible with metastatic disease.  There is a small focus of activity in the posterior inferior margin of the left ear.    9/26/2023 - 10/10/2023 patient completed concurrent chemoradiotherapy with carboplatin and paclitaxel    12/11/2023 CT chest abdomen pelvis with contrast: Postsurgical changes right hemithorax.  Focal area of nodular pleural-parenchymal enhancement about the caudal aspect of the surgical site.    1/16/2024 patient started on Durvalumab    3/18/2024 CT chest with contrast: No change in postsurgical changes right breast with volume loss.  No change in nodular thickening involving the left adrenal gland.    7/1/2024 CT chest abdomen  pelvis with contrast: There is a new 1.7 cm inferior left upper lobe pulmonary nodule concerning for metastatic disease.  Stable abdomen and pelvic CT    7/24/2024 PET/CT: Resolution of the hypermetabolic masses along the right anterior pleural space.  There is a new suspicious hypermetabolic nodule in the lingula.  Resolution of the minimal hypermetabolism along the left auricle.    7/30/2020 for medical oncology visit: Referral to radiation oncology for consideration of SBRT to a single site of metastatic progression    Scheduling Conflicts: no  Systemic Therapy: pending   Port: left   Pacemaker: no  Hx of autoimmune disorders: no  Previous Radiation Therapy:   9/26/2023-11/9/2023 received 6000 cGy to the right lung       Depression Screening Follow Up        Follow Up Actions Taken  Crisis resource information provided in After Visit Summary               Past Medical History:   Diagnosis Date    Alcohol consumption heavy 09/30/2015    BPH associated with nocturia 10/09/2018    Cellulitis and abscess of face 08/12/2015    Right lower cheek    Cellulitis and abscess of oral soft tissues     COPD (chronic obstructive pulmonary disease) (H)     COPD (chronic obstructive pulmonary disease) (H) 03/03/2011    Deviated nasal septum 12/02/1999    s/p surgery    Difficulty in swallowing 02/28/2009    Nathaniel's angina vs. angioedema.  Transnasal flexible laryngoscopy 2/23/2009 and 2/25/2009. [facility] hospitalization    Dyspnea and respiratory abnormality 05/19/2005    Chronic snoring w/o apnea    Dysrhythmia     Esophageal reflux 06/08/2005    GERD    Essential Hypertension 07/13/2005    Hemoptysis 07/11/2022    Hyperlipidemia, unspecified 03/03/2011    Hypertrophic and atrophic condition of skin 07/13/2005    Right eyelid and neck; treated with lequid nitrogen    Lung cancer (H) 01/25/2023    Lung mass 07/11/2022    Malignant neoplasm of lower lobe, right bronchus or lung (H) 11/30/2022    Overweight (BMI 25.0-29.9)  04/10/2019    Subcutaneous emphysema (H24) 2022    Tobacco use disorder 2008       Past Surgical History:   Procedure Laterality Date    AS REPAIR OF NASAL SEPTUM  1999    Septoplasty    BRONCHOSCOPY  2022    BRONCHOSCOPY  2022    BRONCHOSCOPY  2022    cataract right      COLONOSCOPY N/A 2021    Procedure: colonoscopy  ;  Surgeon: Dustin Baires MD;  Location: [facility] OR    COLONOSCOPY  10/13/2011    1 tubular adenoma, sessile 0.5 cm polyp at 20 cm    EGD  10/13/2011    Demonstrates moderately severe to sever bile reflux gastritis    EXC BENIG .1-.5cm Trunk, ARM, LE  1997    Removed ruptured and inflamed epidermal inclusion cyst from left inner buttock    layngoscopy      layngoscopy    LOBECTOMY Right 2022    right lower    PORTACATH PLACEMENT  2023    Insertion PORT A CATH left internal jugular    THORACOSCOPY  2022    Thoracotomy surgery for right lower lobectomy, mediastinal lymph node sampling;    THORACOTOMY  10/25/2022    Right VATS, decortication, rigt pleural biopsy, bronchoscopy    THORACOTOMY  2022    Redo right thoracotomy and chest washout    Ultrasound endobonchial   2022    With fine needle aspiration    UVULECTOMY  1999       Family History   Problem Relation Age of Onset    Liver Cancer Mother          at age 49    Parkinsonism Father     Bladder Cancer Father         smoker    Hypertension Brother     Cancer Brother         renal       Social History     Tobacco Use    Smoking status: Every Day     Current packs/day: 0.50     Average packs/day: 0.5 packs/day for 45.0 years (22.5 ttl pk-yrs)     Types: Cigarettes    Smokeless tobacco: Never    Tobacco comments:     Last attempted to quit 2022   Substance Use Topics    Alcohol use: Yes     Comment: 5 beer daily         CURRENT MEDICATIONS:   Current Outpatient Medications   Medication Sig Dispense Refill    acetaminophen (TYLENOL) 325 MG tablet  Take 500-1,000 mg by mouth (Patient not taking: Reported on 9/6/2023)      albuterol (PROAIR HFA/PROVENTIL HFA/VENTOLIN HFA) 108 (90 Base) MCG/ACT inhaler INHALE 2 PUFFS BY MOUTH EVERY 4 HOURS AS NEEDED FOR WHEEZING OR SHORTNESS OF BREATH--SHAKE BEFORE USING 18 g 0    amLODIPine (NORVASC) 10 MG tablet Take 5 mg by mouth      aspirin 81 MG EC tablet Take 81 mg by mouth daily      BREO ELLIPTA 100-25 MCG/ACT inhaler Inhale 1 puff by mouth once daily 60 each 5    folic acid (FOLVITE) 1 MG tablet Take 1 tablet by mouth daily      magic mouthwash (ENTER INGREDIENTS IN COMMENTS) suspension Swish, gargle, and spit one to two teaspoonfuls every six hours as needed. May be swallowed if esophageal involvement. 240 mL 1    magic mouthwash (ENTER INGREDIENTS IN COMMENTS) suspension Swish, gargle, and spit one to two teaspoonfuls every six hours as needed. May be swallowed if esophageal involvement. 240 mL 1    magnesium oxide (MAG-OX) 400 MG tablet Take 400 mg by mouth      metoprolol tartrate (LOPRESSOR) 25 MG tablet Take 3 tablets by mouth 2 times daily      prochlorperazine (COMPAZINE) 10 MG tablet Take 10 mg by mouth every 6 hours as needed for nausea (Patient not taking: Reported on 9/6/2023)      rosuvastatin (CRESTOR) 20 MG tablet Take 1 tablet by mouth once daily 30 tablet 0     No current facility-administered medications for this visit.         ALLERGIES:  Allergies   Allergen Reactions    Ace Inhibitors     Tramadol Hcl Diarrhea     Vomiting  Ultram             ROS        VITAL SIGNS:  There were no vitals taken for this visit.  Wt Readings from Last 4 Encounters:   11/07/23 66.9 kg (147 lb 8 oz)   10/31/23 68.1 kg (150 lb 3.2 oz)   10/24/23 68.3 kg (150 lb 9.6 oz)   10/17/23 67.1 kg (147 lb 14.4 oz)       PHYSICAL EXAM:  Constitutional: awake, alert, cooperative, no apparent distress, and appears stated age  Eyes: Lids and lashes normal, pupils equal, round and reactive to light, extra ocular muscles intact, sclera  clear, conjunctiva normal  ENT: Normocephalic, without obvious abnormality, atraumatic, sinuses nontender on palpation, external ears without lesions, oral pharynx with moist mucous membranes, tonsils without erythema or exudates, gums normal and good dentition.  Hematologic / Lymphatic: no cervical lymphadenopathy  Respiratory:  The left lung shows to be clear.  Exam of the right lung shows diminished breath sounds particularly in the lower part of the right lung.  Cardiovascular: Normal apical impulse, regular rate and rhythm, normal S1 and S2, no S3 or S4, and no murmur noted  GI: No scars, normal bowel sounds, soft, non-distended, non-tender, no masses palpated, no hepatosplenomegally  Genitounirinary: Deferred  Skin: no bruising or bleeding  Musculoskeletal: There is no redness, warmth, or swelling of the joints.  Full range of motion noted.  Motor strength is 5 out of 5 all extremities bilaterally.  Tone is normal.  Neurologic: Awake, alert, oriented to name, place and time.  Cranial nerves II-XII are grossly intact.  Motor is 5 out of 5 bilaterally.  Cerebellar finger to nose, heel to shin intact.  Sensory is intact.  Babinski down going, Romberg negative, and gait is normal.  Neuropsychiatric: The patient is alert and cooperative and answers questions appropriately.    Physician attestation:  I saw and evaluated patient and I personally reviewed imaging including CT scan and PET scan along with pathology and physician documentation.  I have reviewed his previous management including his previous radiation.. Key management decisions made by me and carried out under my direction include the role of SBRT for this clinically malignant lesion in the lingula of the left lung.  We discussed with the patient that this may be metastatic from his previous disease or a new primary but that management would not be affected and we have not recommended a biopsy.     Physician spent 20 minutes with the patient and 20  minutes on chart, image, and documentation review, for a total of 40 minutes.     Total billable time: 40 minutes on this date of service           Carlyle Harris MD

## 2024-08-09 PROBLEM — T79.7XXA SUBCUTANEOUS EMPHYSEMA (H): Status: ACTIVE | Noted: 2022-12-09

## 2024-08-09 PROBLEM — C34.31 MALIGNANT NEOPLASM OF LOWER LOBE, RIGHT BRONCHUS OR LUNG (H): Status: ACTIVE | Noted: 2022-11-30

## 2024-08-09 PROBLEM — I48.0 PAROXYSMAL ATRIAL FIBRILLATION (H): Status: ACTIVE | Noted: 2024-08-01

## 2024-08-09 PROBLEM — Z90.2 S/P LOBECTOMY OF LUNG: Status: ACTIVE | Noted: 2022-11-30

## 2024-08-09 PROBLEM — I50.22 HEART FAILURE WITH MID-RANGE EJECTION FRACTION (H): Status: ACTIVE | Noted: 2024-08-01

## 2024-08-09 NOTE — PROGRESS NOTES
"[facility]  DEPARTMENT OF RADIATION ONCOLOGY  INITIAL PATIENT ASSESSMENT    Name: Jaxson Ramirez MRN: [id number]   : 1955 (69 year old)  Date of Service: 2024   Referring: Tobias Fuentes MD       Diagnosis: recurrent lung cancer    Prior radiation therapy:   Site Treated: right lung  Facility: [facility]  Dates: 2023-2023  Dose: 6000 cGy    Prior chemotherapy/immunotherapy:   Protocol: carboplatin and paclitaxel  Facility:   Dates: 2023-2023, concurrent with XRT    Protocol:  durvalumab  Facility:    Dates:  2024-present, last infusion 24.  Next infusion is scheduled for 24.    T/c to obtain health history.  Spoke with patient.  Discussed cancer rehab and he declined.  He does not have any financial concerns.  He does not have any transportation issues.  Appointment confirmed for 24.  He has no questions or concerns.  He will bring in a copy of his current medication list.       Nutrition Assessment    Height: 5' 5\" Weight: 144#    Usual Weight: 140s Weight Change: 0, wt stable      Past Medical History:   Diagnosis Date    Alcohol consumption heavy 2015    BPH associated with nocturia 10/09/2018    Cellulitis and abscess of face 2015    Right lower cheek    Cellulitis and abscess of oral soft tissues     COPD (chronic obstructive pulmonary disease) (H) 2011    Deviated nasal septum 1999    s/p surgery    Difficulty in swallowing 2009    Nathaniel's angina vs. angioedema.  Transnasal flexible laryngoscopy 2009 and 2009. [facility] hospitalization    Dyspnea and respiratory abnormality 2005    Chronic snoring w/o apnea    Dysrhythmia     Esophageal reflux 2005    GERD    Essential Hypertension 2005    Heart failure with mid-range ejection fraction (H) 2024    Hemoptysis 2022    Hyperlipidemia, unspecified 2011    Hypertrophic and atrophic condition of skin 2005    Right " eyelid and neck; treated with lequid nitrogen    Lung cancer (H) 01/25/2023    Lung mass 07/11/2022    Malignant neoplasm of lower lobe, right bronchus or lung (H) 11/30/2022    Overweight (BMI 25.0-29.9) 04/10/2019    Paroxysmal atrial fibrillation (H) 08/01/2024    Participant in Tumor Board clinical trial     Research Study: [id number] Tumor Board study Investigator: Dr. Abbasi Friday Primary Research Assistant: Milton Edouard    Please call [phone] with questions. Please notify Oncology Clinical Trials Dept: [phone] if patient is admitted to the hospital for any reason.     Study Start Date: 8/22/2023 Off Study Date:    S/P lobectomy of lung 11/30/2022    Subcutaneous emphysema (H24) 12/09/2022    Tobacco use disorder 02/19/2008       1. Receiving Chemotherapy? No - 0 points  2. Weight Loss per Month (in pounds) Based on Usual Weight: 0    100# 100-120# 120-150# 150-200# greater than 200# Pt. System   greater than 5 5 - 6 6 - 8 7 - 10 greater than 10 1 Point   greater than 7 7- 9 9 - 11 11 - 14 greater than 15 2 Points   greater than 10 10 - 12 12 - 15 15 - 20 greater than 20 3 Points       3. Eating Problems: ( 1 Point for Each Problem)       Loss of Appetite     No - 0 points    Difficulty Swallowing    No - 0 points   Nausea    No - 0 points    Early Satiety    No - 0 points   Vomiting    No - 0 points    Taste/Smell Aversions  No - 0 points    Diarrhea    No - 0 points    Esophageal Reflux   No - 0 points   Mouth Soreness/Difficulty Chewing  No - 0 points          Total: 0    4.  Automatic Referral for the Following Diagnosis or Situations:  NA     Comments:     Total Score: 0 (Scores greater than or equal to 4 will receive a Dietician Consult)        Cheryle Gamino RN

## 2024-08-12 ENCOUNTER — ONCOLOGY VISIT (OUTPATIENT)
Dept: RADIATION ONCOLOGY | Facility: HOSPITAL | Age: 69
End: 2024-08-12
Attending: RADIOLOGY
Payer: COMMERCIAL

## 2024-08-12 VITALS
WEIGHT: 145.2 LBS | SYSTOLIC BLOOD PRESSURE: 108 MMHG | DIASTOLIC BLOOD PRESSURE: 72 MMHG | HEIGHT: 65 IN | TEMPERATURE: 98.2 F | HEART RATE: 76 BPM | BODY MASS INDEX: 24.19 KG/M2 | RESPIRATION RATE: 20 BRPM | OXYGEN SATURATION: 98 %

## 2024-08-12 DIAGNOSIS — C34.92 MALIGNANT NEOPLASM OF LEFT LUNG, UNSPECIFIED PART OF LUNG (H): Primary | ICD-10-CM

## 2024-08-12 PROCEDURE — 99204 OFFICE O/P NEW MOD 45 MIN: CPT | Performed by: RADIOLOGY

## 2024-08-12 PROCEDURE — G0463 HOSPITAL OUTPT CLINIC VISIT: HCPCS

## 2024-08-12 RX ORDER — VARENICLINE TARTRATE 0.5 (11)-1
KIT ORAL
COMMUNITY
Start: 2024-05-14

## 2024-08-12 RX ORDER — SPIRONOLACTONE 25 MG/1
1 TABLET ORAL DAILY
COMMUNITY
Start: 2024-08-01

## 2024-08-12 RX ORDER — ROSUVASTATIN CALCIUM 40 MG/1
1 TABLET, COATED ORAL
COMMUNITY
Start: 2024-08-07

## 2024-08-12 RX ORDER — FLUTICASONE PROPIONATE AND SALMETEROL 100; 50 UG/1; UG/1
POWDER RESPIRATORY (INHALATION)
COMMUNITY
Start: 2024-06-25

## 2024-08-12 RX ORDER — FUROSEMIDE 20 MG
40 TABLET ORAL
COMMUNITY
Start: 2024-08-01

## 2024-08-12 ASSESSMENT — PATIENT HEALTH QUESTIONNAIRE - PHQ9: SUM OF ALL RESPONSES TO PHQ QUESTIONS 1-9: 7

## 2024-08-12 ASSESSMENT — PAIN SCALES - GENERAL: PAINLEVEL: NO PAIN (0)

## 2024-08-12 NOTE — PROGRESS NOTES
"Radiation Oncology Rooming Note    August 12, 2024 1:32 PM     Jaxson Ramirez is a 69 year old male who presents for:       Chief Complaint   Patient presents with    Consult     Radiation therapy consult       Initial Vitals: /72 (BP Location: Right arm, Patient Position: Chair, Cuff Size: Adult Regular)   Pulse 76   Temp 98.2  F (36.8  C) (Oral)   Resp 20   Ht 1.651 m (5' 5\")   Wt 65.9 kg (145 lb 3.2 oz)   SpO2 98%   BMI 24.16 kg/m     Estimated body mass index is 24.16 kg/m  as calculated from the following:    Height as of this encounter: 1.651 m (5' 5\").    Weight as of this encounter: 65.9 kg (145 lb 3.2 oz).   Body surface area is 1.74 meters squared.  No Pain (0) Comment: Data Unavailable       Medications reviewed: Yes      Patient completed the following questionnaires: PHQ-9 and NCCN Distress Thermometer.       Patient was assessed using the NCCN psychosocial distress thermometer. Patient rated the score as a 0.5. Patient rated current stressors as sleep, loss or change of physical abilities, and worry/anxiety. Stressors brought to the attention of  Dr. Harris  for a score of 6 or greater or per nurses discretion.         Deborah Coulter LPN            "

## 2024-08-19 ENCOUNTER — ALLIED HEALTH/NURSE VISIT (OUTPATIENT)
Dept: RADIATION ONCOLOGY | Facility: HOSPITAL | Age: 69
End: 2024-08-19
Payer: COMMERCIAL

## 2024-08-19 DIAGNOSIS — C34.92 MALIGNANT NEOPLASM OF LEFT LUNG, UNSPECIFIED PART OF LUNG (H): Primary | ICD-10-CM

## 2024-08-19 PROCEDURE — 77334 RADIATION TREATMENT AID(S): CPT | Mod: 26 | Performed by: RADIOLOGY

## 2024-08-19 PROCEDURE — 77263 THER RADIOLOGY TX PLNG CPLX: CPT | Performed by: RADIOLOGY

## 2024-08-19 PROCEDURE — 77334 RADIATION TREATMENT AID(S): CPT | Performed by: RADIOLOGY

## 2024-08-19 PROCEDURE — 77470 SPECIAL RADIATION TREATMENT: CPT | Mod: 26 | Performed by: RADIOLOGY

## 2024-08-19 PROCEDURE — 77470 SPECIAL RADIATION TREATMENT: CPT | Performed by: RADIOLOGY

## 2024-08-19 NOTE — PROGRESS NOTES
[facility]  DEPARTMENT OF RADIATION ONCOLOGY   SIMULATION NOTE    Name: Jaxson Ramirez MRN: [id number]   : 1955 (69 year old)  Date of Service: Aug 19, 2024        Diagnosis and Cancer Staging   No matching staging information was found for the patient.       Procedure   For frameless SBRT, the  patient comes to clinic for simulation and radiation therapy for treatment as specified on the written consent (site of treatment, type of cancer). Therapy, Treatment Planning, and I reviewed the anticipated radiation therapy, clinical history and documentation, and radiographic information and images. We verified written consent for treatment. With the patient, we verified his identification, site, and side of treatment. We evaluated multiple setup positions and elected to simulate the patient in a modified supine position. We used orthogonal lasers to align them with the CT simulator. We immobilized the patient with a customized torso mold and accessories to improve the reproducibility and safety for daily radiation therapy. We placed radiopaque markers to assist in identifying topographical landmarks for simulation. We obtained  and axial CT imaging through the target region. We used virtual simulation techniques to verify the adequacy of the CT images and to create a preliminary setup isocenter. Motion management of respiratory and cardiac motion utilized 4D-CT and/or deep inspiration breath hold (DIBH) through the anticipated field of radiation therapy. We placed tattoos to rona the setup isocenter. The patient tolerated the procedure well and without complications. We will use available diagnostic and radiation therapy imaging studies for CT-based treatment planning with image fusion as indicated. I anticipate utilizing a form of intensity-modulated or 3D-conformal radiation therapy to develop a computerized treatment plan whose dosimetric analysis (e.g., dose-volume histogram (DVH))  indicates adequate coverage of target tissues and sparing of nearby normal structures. We will complete routine QA procedures. The patient wished to proceed as recommended. We answered all questions to his satisfaction.    Implanted Cardiac Devices: None.      JAYA Mohr CNP   Department of Radiation Oncology  Tel: [phone]  Fax: [phone]

## 2024-08-26 PROCEDURE — 77293 RESPIRATOR MOTION MGMT SIMUL: CPT | Performed by: RADIOLOGY

## 2024-08-26 PROCEDURE — 77301 RADIOTHERAPY DOSE PLAN IMRT: CPT | Performed by: RADIOLOGY

## 2024-08-26 PROCEDURE — 77293 RESPIRATOR MOTION MGMT SIMUL: CPT | Mod: 26 | Performed by: RADIOLOGY

## 2024-08-26 PROCEDURE — 77338 DESIGN MLC DEVICE FOR IMRT: CPT | Performed by: RADIOLOGY

## 2024-08-26 PROCEDURE — 77300 RADIATION THERAPY DOSE PLAN: CPT | Mod: 26 | Performed by: RADIOLOGY

## 2024-08-26 PROCEDURE — 77301 RADIOTHERAPY DOSE PLAN IMRT: CPT | Mod: 26 | Performed by: RADIOLOGY

## 2024-08-26 PROCEDURE — 77300 RADIATION THERAPY DOSE PLAN: CPT | Performed by: RADIOLOGY

## 2024-08-26 PROCEDURE — 77338 DESIGN MLC DEVICE FOR IMRT: CPT | Mod: 26 | Performed by: RADIOLOGY

## 2024-08-28 ENCOUNTER — APPOINTMENT (OUTPATIENT)
Dept: RADIATION ONCOLOGY | Facility: HOSPITAL | Age: 69
End: 2024-08-28
Payer: COMMERCIAL

## 2024-08-28 ENCOUNTER — RESULTS ONLY (OUTPATIENT)
Dept: RADIATION ONCOLOGY | Facility: HOSPITAL | Age: 69
End: 2024-08-28

## 2024-08-28 ENCOUNTER — OFFICE VISIT (OUTPATIENT)
Dept: RADIATION ONCOLOGY | Facility: HOSPITAL | Age: 69
End: 2024-08-28

## 2024-08-28 DIAGNOSIS — C34.92 MALIGNANT NEOPLASM OF LEFT LUNG, UNSPECIFIED PART OF LUNG (H): Primary | ICD-10-CM

## 2024-08-28 LAB
RAD ONC ARIA COURSE ID: NORMAL
RAD ONC ARIA COURSE LAST TREATMENT DATE: NORMAL
RAD ONC ARIA COURSE START DATE: NORMAL
RAD ONC ARIA COURSE TREATMENT ELAPSED DAYS: 0
RAD ONC ARIA FIRST TREATMENT DATE: NORMAL
RAD ONC ARIA PLAN FRACTIONS TREATED TO DATE: 1
RAD ONC ARIA PLAN ID: NORMAL
RAD ONC ARIA PLAN PRESCRIBED DOSE PER FRACTION: 11 GY
RAD ONC ARIA PLAN TOTAL FRACTIONS PRESCRIBED: 5
RAD ONC ARIA PLAN TOTAL PRESCRIBED DOSE: 5500 CGY
RAD ONC ARIA REFERENCE POINT DOSAGE GIVEN TO DATE: NORMAL GY
RAD ONC ARIA REFERENCE POINT DOSAGE GIVEN TO DATE: NORMAL GY
RAD ONC ARIA REFERENCE POINT ID: NORMAL
RAD ONC ARIA REFERENCE POINT ID: NORMAL

## 2024-08-28 NOTE — PROGRESS NOTES
Jaxson Ramirez  MRN: [id number]  YOB: 1955  ENCOUNTER DATE: Aug 28, 2024    SBRT Documentation Note    Region Treated: Left lung     Treatment Dose: 1100 cGy    Cumulative Dose: 1100 cGy    Number of Treatments: 1 of 5 planned fractions    The patient was brought into the treatment suite and immobilized.  All imaging was deemed appropriate and duplicating simulation parameters and thus treatment was delivered. Treatment was given without incident. The patient was evaluated clinically, had no complications and denies side effects following treatment.     This note was prepared by Abimael Stacy MD  Patient was evaluated by Abimael Stacy MD

## 2024-08-30 ENCOUNTER — OFFICE VISIT (OUTPATIENT)
Dept: RADIATION ONCOLOGY | Facility: HOSPITAL | Age: 69
End: 2024-08-30
Payer: COMMERCIAL

## 2024-08-30 ENCOUNTER — RESULTS ONLY (OUTPATIENT)
Dept: RADIATION ONCOLOGY | Facility: HOSPITAL | Age: 69
End: 2024-08-30

## 2024-08-30 DIAGNOSIS — C34.92 MALIGNANT NEOPLASM OF LEFT LUNG, UNSPECIFIED PART OF LUNG (H): Primary | ICD-10-CM

## 2024-08-30 LAB
RAD ONC ARIA COURSE ID: NORMAL
RAD ONC ARIA COURSE LAST TREATMENT DATE: NORMAL
RAD ONC ARIA COURSE START DATE: NORMAL
RAD ONC ARIA COURSE TREATMENT ELAPSED DAYS: 2
RAD ONC ARIA FIRST TREATMENT DATE: NORMAL
RAD ONC ARIA PLAN FRACTIONS TREATED TO DATE: 2
RAD ONC ARIA PLAN ID: NORMAL
RAD ONC ARIA PLAN PRESCRIBED DOSE PER FRACTION: 11 GY
RAD ONC ARIA PLAN TOTAL FRACTIONS PRESCRIBED: 5
RAD ONC ARIA PLAN TOTAL PRESCRIBED DOSE: 5500 CGY
RAD ONC ARIA REFERENCE POINT DOSAGE GIVEN TO DATE: NORMAL GY
RAD ONC ARIA REFERENCE POINT DOSAGE GIVEN TO DATE: NORMAL GY
RAD ONC ARIA REFERENCE POINT ID: NORMAL
RAD ONC ARIA REFERENCE POINT ID: NORMAL

## 2024-08-30 NOTE — PROGRESS NOTES
Jaxson Ramirez  MRN: [id number]  YOB: 1955  ENCOUNTER DATE: Aug 30, 2024    SBRT Documentation Note    Region Treated: Left Lung     Treatment Dose: 1100 cGy    Cumulative Dose: 2200 cGy    Number of Treatments: 2 of 5 planned fractions    The patient was brought into the treatment suite and immobilized.  All imaging was deemed appropriate and duplicating simulation parameters and thus treatment was delivered. Treatment was given without incident. The patient was evaluated clinically, had no complications and denies side effects following treatment.     This note was prepared by Abimael Stacy MD  Patient was evaluated by Abimael Stacy MD

## 2024-09-03 ENCOUNTER — OFFICE VISIT (OUTPATIENT)
Dept: RADIATION ONCOLOGY | Facility: HOSPITAL | Age: 69
End: 2024-09-03
Payer: COMMERCIAL

## 2024-09-03 ENCOUNTER — RESULTS ONLY (OUTPATIENT)
Dept: RADIATION ONCOLOGY | Facility: HOSPITAL | Age: 69
End: 2024-09-03

## 2024-09-03 ENCOUNTER — APPOINTMENT (OUTPATIENT)
Dept: RADIATION ONCOLOGY | Facility: HOSPITAL | Age: 69
End: 2024-09-03
Payer: COMMERCIAL

## 2024-09-03 DIAGNOSIS — C34.92 MALIGNANT NEOPLASM OF LEFT LUNG, UNSPECIFIED PART OF LUNG (H): Primary | ICD-10-CM

## 2024-09-03 LAB
RAD ONC ARIA COURSE ID: NORMAL
RAD ONC ARIA COURSE LAST TREATMENT DATE: NORMAL
RAD ONC ARIA COURSE START DATE: NORMAL
RAD ONC ARIA COURSE TREATMENT ELAPSED DAYS: 6
RAD ONC ARIA FIRST TREATMENT DATE: NORMAL
RAD ONC ARIA PLAN FRACTIONS TREATED TO DATE: 3
RAD ONC ARIA PLAN ID: NORMAL
RAD ONC ARIA PLAN PRESCRIBED DOSE PER FRACTION: 11 GY
RAD ONC ARIA PLAN TOTAL FRACTIONS PRESCRIBED: 5
RAD ONC ARIA PLAN TOTAL PRESCRIBED DOSE: 5500 CGY
RAD ONC ARIA REFERENCE POINT DOSAGE GIVEN TO DATE: NORMAL GY
RAD ONC ARIA REFERENCE POINT DOSAGE GIVEN TO DATE: NORMAL GY
RAD ONC ARIA REFERENCE POINT ID: NORMAL
RAD ONC ARIA REFERENCE POINT ID: NORMAL

## 2024-09-03 NOTE — PROGRESS NOTES
Jaxson Ramirez  MRN: [id number]  YOB: 1955  ENCOUNTER DATE: Sep 3, 2024    SBRT Documentation Note    Region Treated: Left lower lobe     Treatment Dose: 1100 cGy    Cumulative Dose 3300 cGy    Number of Treatments: 3 of 5 planned fractions    The patient was brought into the treatment suite and immobilized.  All imaging was deemed appropriate and duplicating simulation parameters and thus treatment was delivered. Treatment was given without incident. The patient was evaluated clinically, had no complications and denies side effects following treatment.     This note was prepared by Deborah Coulter LPN  Patient was evaluated by Dr. Abimael Stacy

## 2024-09-05 ENCOUNTER — ALLIED HEALTH/NURSE VISIT (OUTPATIENT)
Dept: RADIATION ONCOLOGY | Facility: HOSPITAL | Age: 69
End: 2024-09-05

## 2024-09-05 ENCOUNTER — RESULTS ONLY (OUTPATIENT)
Dept: RADIATION ONCOLOGY | Facility: HOSPITAL | Age: 69
End: 2024-09-05

## 2024-09-05 DIAGNOSIS — C34.92 MALIGNANT NEOPLASM OF LEFT LUNG, UNSPECIFIED PART OF LUNG (H): Primary | ICD-10-CM

## 2024-09-05 LAB
RAD ONC ARIA COURSE ID: NORMAL
RAD ONC ARIA COURSE LAST TREATMENT DATE: NORMAL
RAD ONC ARIA COURSE START DATE: NORMAL
RAD ONC ARIA COURSE TREATMENT ELAPSED DAYS: 8
RAD ONC ARIA FIRST TREATMENT DATE: NORMAL
RAD ONC ARIA PLAN FRACTIONS TREATED TO DATE: 4
RAD ONC ARIA PLAN ID: NORMAL
RAD ONC ARIA PLAN PRESCRIBED DOSE PER FRACTION: 11 GY
RAD ONC ARIA PLAN TOTAL FRACTIONS PRESCRIBED: 5
RAD ONC ARIA PLAN TOTAL PRESCRIBED DOSE: 5500 CGY
RAD ONC ARIA REFERENCE POINT DOSAGE GIVEN TO DATE: NORMAL GY
RAD ONC ARIA REFERENCE POINT DOSAGE GIVEN TO DATE: NORMAL GY
RAD ONC ARIA REFERENCE POINT ID: NORMAL
RAD ONC ARIA REFERENCE POINT ID: NORMAL

## 2024-09-05 PROCEDURE — 77373 STRTCTC BDY RAD THER TX DLVR: CPT | Performed by: RADIOLOGY

## 2024-09-06 NOTE — PROGRESS NOTES
Jaxson Ramirez  MRN: [id number]  YOB: 1955  ENCOUNTER DATE: Sep 5, 2024    SBRT Documentation Note    Region Treated: Left Lung     Treatment Dose: 1100 cGy    Cumulative Dose: 4400 cGy    Number of Treatments: 4 of 5 planned fractions    The patient was brought into the treatment suite and immobilized.  All imaging was deemed appropriate and duplicating simulation parameters and thus treatment was delivered. Treatment was given without incident. The patient was evaluated clinically, had no complications and denies side effects following treatment.     This note was prepared by Abimael Stacy MD  Patient was evaluated by Abimael Stacy MD

## 2024-09-09 NOTE — PROGRESS NOTES
Progress Notes  Encounter Date: Sep 10, 2024  RADIATION ONCOLOGY WEEKLY MANAGEMENT PROGRESS NOTE     Patient Care Team         Relationship Specialty Notifications Start End    Sathihs Bocanegra APRN CNP PCP - General   4/5/24     Phone: [phone] Fax: [phone]         [address] [address]    Annette William NP Assigned PCP   5/23/24     Phone: [phone] Fax: [phone]         [facility] [address] [place] [address]    Jo Ann Camacho APRN CNP Nurse Practitioner Radiation Oncology  8/9/24     Phone: [phone] Fax: [phone]         [address] [place] [address]    Tobias Fuentes MD MD Hematology  8/9/24     Phone: [phone] Fax: [phone]         [facility] [address] [address]    Sandra Das PA-C    8/9/24     Phone: [phone] Fax: [phone]         [address] [place] [address] [facility] [address]    Carlyle Harris MD Assigned Cancer Care Provider   8/23/24     Phone: [phone] Fax: [phone]         [address] [place] [address]                    DIAGNOSIS:  Cancer Staging   No matching staging information was found for the patient.          RADIATION THERAPY:    Jaxson Ramirez has received 5500 cGy to date to Left lung.   Treatment 5/5  Total planned dose: 5500 cGy      SUBJECTIVE:    Currently he states he has noticed an increase in phlegm production.         OBJECTIVE:    WEIGHT: 142 lbs 14.4 oz. BP (!) 142/78 (BP Location: Right arm, Patient Position: Chair, Cuff Size: Adult Regular)   Pulse 72   Temp 97.3  F (36.3  C) (Tympanic)   Resp 20   Wt 64.8 kg (142 lb 14.4 oz)   SpO2 95%   BMI 23.78 kg/m    Examination reveals an alert non ill appearing male patient, respirations non labored.        IMPRESSION:  Routine tolerance to radiation therapy.       PLAN:  Continue treatment as planned. Will follow with medical oncology. Has appointment scheduled        Covering locum provider was present  during face to face encounter with patient and has reviewed medical record and imaging.      MALINA Guzman MD

## 2024-09-10 ENCOUNTER — OFFICE VISIT (OUTPATIENT)
Dept: RADIATION ONCOLOGY | Facility: HOSPITAL | Age: 69
End: 2024-09-10
Payer: COMMERCIAL

## 2024-09-10 ENCOUNTER — RESULTS ONLY (OUTPATIENT)
Dept: RADIATION ONCOLOGY | Facility: HOSPITAL | Age: 69
End: 2024-09-10

## 2024-09-10 VITALS
DIASTOLIC BLOOD PRESSURE: 78 MMHG | RESPIRATION RATE: 20 BRPM | BODY MASS INDEX: 23.78 KG/M2 | HEART RATE: 72 BPM | SYSTOLIC BLOOD PRESSURE: 142 MMHG | OXYGEN SATURATION: 95 % | TEMPERATURE: 97.3 F | WEIGHT: 142.9 LBS

## 2024-09-10 DIAGNOSIS — C34.92 MALIGNANT NEOPLASM OF LEFT LUNG, UNSPECIFIED PART OF LUNG (H): Primary | ICD-10-CM

## 2024-09-10 LAB
RAD ONC ARIA COURSE ID: NORMAL
RAD ONC ARIA COURSE LAST TREATMENT DATE: NORMAL
RAD ONC ARIA COURSE START DATE: NORMAL
RAD ONC ARIA COURSE TREATMENT ELAPSED DAYS: 13
RAD ONC ARIA FIRST TREATMENT DATE: NORMAL
RAD ONC ARIA PLAN FRACTIONS TREATED TO DATE: 5
RAD ONC ARIA PLAN ID: NORMAL
RAD ONC ARIA PLAN PRESCRIBED DOSE PER FRACTION: 11 GY
RAD ONC ARIA PLAN TOTAL FRACTIONS PRESCRIBED: 5
RAD ONC ARIA PLAN TOTAL PRESCRIBED DOSE: 5500 CGY
RAD ONC ARIA REFERENCE POINT DOSAGE GIVEN TO DATE: NORMAL GY
RAD ONC ARIA REFERENCE POINT DOSAGE GIVEN TO DATE: NORMAL GY
RAD ONC ARIA REFERENCE POINT ID: NORMAL
RAD ONC ARIA REFERENCE POINT ID: NORMAL

## 2024-09-10 ASSESSMENT — PAIN SCALES - GENERAL: PAINLEVEL: NO PAIN (0)

## 2024-09-10 NOTE — PROGRESS NOTES
Jaxson Ramirez  Gender: male  : 1955  Medical Record: [id number]  Primary Care Provider: Sathish Bocanegra    REFERRING PHYSICIANS: Dr. Fuentes     DIAGNOSIS: Malignant neoplasm of left lung, unspecified part of lung (H)    TREATMENT INTENT: Curative   AREA TREATED: left lung   PRIMARY TREATMENT TECHNIQUE: SBRT VMAT  ENERGY: 6X FFF  TUMOR DOSE: 5500 cGy  NUMBER OF TREATMENTS: 5    ELAPSED CALENDAR DAYS: 13  COMPLETION DATE: 09/10/2024       Jo Ann Camacho DNP, APRN, FNP-C   Department of Radiation Oncology

## 2024-11-15 ENCOUNTER — HOSPITAL ENCOUNTER (EMERGENCY)
Facility: HOSPITAL | Age: 69
Discharge: HOME | End: 2024-11-15
Payer: COMMERCIAL

## 2024-11-15 VITALS
OXYGEN SATURATION: 96 % | HEART RATE: 84 BPM | SYSTOLIC BLOOD PRESSURE: 131 MMHG | RESPIRATION RATE: 19 BRPM | TEMPERATURE: 98.5 F | DIASTOLIC BLOOD PRESSURE: 83 MMHG

## 2024-11-15 DIAGNOSIS — L02.31 CELLULITIS AND ABSCESS OF BUTTOCK: ICD-10-CM

## 2024-11-15 DIAGNOSIS — L03.317 CELLULITIS AND ABSCESS OF BUTTOCK: ICD-10-CM

## 2024-11-15 PROCEDURE — 87070 CULTURE OTHR SPECIMN AEROBIC: CPT

## 2024-11-15 PROCEDURE — 999N000104 HC STATISTIC NO CHARGE

## 2024-11-15 PROCEDURE — 87186 SC STD MICRODIL/AGAR DIL: CPT

## 2024-11-15 PROCEDURE — 10060 I&D ABSCESS SIMPLE/SINGLE: CPT

## 2024-11-15 PROCEDURE — 250N000011 HC RX IP 250 OP 636

## 2024-11-15 RX ORDER — SULFAMETHOXAZOLE AND TRIMETHOPRIM 800; 160 MG/1; MG/1
1 TABLET ORAL 2 TIMES DAILY
Qty: 20 TABLET | Refills: 0 | Status: SHIPPED | OUTPATIENT
Start: 2024-11-15 | End: 2024-11-25

## 2024-11-15 RX ADMIN — LIDOCAINE HYDROCHLORIDE,EPINEPHRINE BITARTRATE 10 ML: 20; .01 INJECTION, SOLUTION INFILTRATION; PERINEURAL at 13:19

## 2024-11-15 ASSESSMENT — ACTIVITIES OF DAILY LIVING (ADL): ADLS_ACUITY_SCORE: 0

## 2024-11-15 ASSESSMENT — ENCOUNTER SYMPTOMS
ACTIVITY CHANGE: 0
ABDOMINAL PAIN: 0
FEVER: 0
CHILLS: 0
VOMITING: 0
WOUND: 1

## 2024-11-15 ASSESSMENT — COLUMBIA-SUICIDE SEVERITY RATING SCALE - C-SSRS
2. HAVE YOU ACTUALLY HAD ANY THOUGHTS OF KILLING YOURSELF IN THE PAST MONTH?: NO
1. IN THE PAST MONTH, HAVE YOU WISHED YOU WERE DEAD OR WISHED YOU COULD GO TO SLEEP AND NOT WAKE UP?: NO
6. HAVE YOU EVER DONE ANYTHING, STARTED TO DO ANYTHING, OR PREPARED TO DO ANYTHING TO END YOUR LIFE?: NO

## 2024-11-15 NOTE — ED TRIAGE NOTES
C/o mass    Mass to the right cheek, no head noted by wife    Monday it was noticed    Soaks, and warm compresses

## 2024-11-15 NOTE — DISCHARGE INSTRUCTIONS
Bactrim 2 times daily for 10 days. Yogurt or probiotic while taking.   Tylenol as needed.   Change packing in 2 days.   They will be calling from surgery.   Return with any new or concerning symptoms.

## 2024-11-15 NOTE — ED PROVIDER NOTES
History     Chief Complaint   Patient presents with    Cyst     HPI  Jaxson Ramirez is a 69 year old male who presents to the urgent care with complaints of an abscess to left buttocks. Has had for 1-2 weeks, worse the last few days. He denies fevers, n/v/d, dysuria, and difficulty having bowel movements but states he has not felt the best the last few days. Hx of lung CA, not currently on chemo. Anticoagulated with Eliquis. No recent abx or OTC medications. Has a hx of abscesses, which he does not normally have to come in to have drained.       Allergies:  Allergies   Allergen Reactions    Ace Inhibitors     Tramadol Hcl Diarrhea     Vomiting  Ultram         Problem List:    Patient Active Problem List    Diagnosis Date Noted    Heart failure with mid-range ejection fraction (H) 08/01/2024     Priority: Medium    Paroxysmal atrial fibrillation (H) 08/01/2024     Priority: Medium    Subcutaneous emphysema (H) 12/09/2022     Priority: Medium    Malignant neoplasm of lower lobe, right bronchus or lung (H) 11/30/2022     Priority: Medium    S/P lobectomy of lung 11/30/2022     Priority: Medium    Suicidal ideation 10/14/2022     Priority: Medium    Malignant neoplasm of left lung, unspecified part of lung (H) 08/04/2022     Priority: Medium    Lung mass 07/11/2022     Priority: Medium     Formatting of this note might be different from the original.  Added automatically from request for surgery [id number]      Hemoptysis 07/11/2022     Priority: Medium     Formatting of this note might be different from the original.  Added automatically from request for surgery [id number]      Positive colorectal cancer screening using Cologuard test 09/25/2020     Priority: Medium     Added automatically from request for surgery [id number]      Essential hypertension 10/21/2019     Priority: Medium    Pulmonary emphysema, unspecified emphysema type (H) 10/21/2019     Priority: Medium    Tobacco abuse 10/21/2019     Priority: Medium     Hyperlipidemia, unspecified hyperlipidemia type 10/21/2019     Priority: Medium    Overweight (BMI 25.0-29.9) 04/10/2019     Priority: Medium    BPH associated with nocturia 10/09/2018     Priority: Medium    Alcohol consumption heavy 09/30/2015     Priority: Medium    Elevated fasting glucose 09/25/2013     Priority: Medium        Past Medical History:    Past Medical History:   Diagnosis Date    Alcohol consumption heavy 09/30/2015    BPH associated with nocturia 10/09/2018    Cellulitis and abscess of face 08/12/2015    Cellulitis and abscess of oral soft tissues     COPD (chronic obstructive pulmonary disease) (H) 03/03/2011    Deviated nasal septum 12/02/1999    Difficulty in swallowing 02/28/2009    Dyspnea and respiratory abnormality 05/19/2005    Dysrhythmia     Esophageal reflux 06/08/2005    Essential Hypertension 07/13/2005    Heart failure with mid-range ejection fraction (H) 08/01/2024    Hemoptysis 07/11/2022    Hyperlipidemia, unspecified 03/03/2011    Hypertrophic and atrophic condition of skin 07/13/2005    Lung cancer (H) 01/25/2023    Lung mass 07/11/2022    Malignant neoplasm of lower lobe, right bronchus or lung (H) 11/30/2022    Overweight (BMI 25.0-29.9) 04/10/2019    Paroxysmal atrial fibrillation (H) 08/01/2024    Participant in Tumor Board clinical trial     S/P lobectomy of lung 11/30/2022    Subcutaneous emphysema (H) 12/09/2022    Tobacco use disorder 02/19/2008       Past Surgical History:    Past Surgical History:   Procedure Laterality Date    AS REPAIR OF NASAL SEPTUM  12/02/1999    Septoplasty    BRONCHOSCOPY  07/18/2022    BRONCHOSCOPY  08/29/2022    BRONCHOSCOPY  11/30/2022    cataract right      COLONOSCOPY N/A 11/11/2021    Procedure: colonoscopy  ;  Surgeon: Dustin Baires MD;  Location: [facility]    COLONOSCOPY  10/13/2011    1 tubular adenoma, sessile 0.5 cm polyp at 20 cm    EGD  10/13/2011    Demonstrates moderately severe to sever bile reflux gastritis    EXC BENIG  .1-.5cm Trunk, ARM, LE  1997    Removed ruptured and inflamed epidermal inclusion cyst from left inner buttock    layngoscopy      layngoscopy    LOBECTOMY Right 2022    right lower    PORTACATH PLACEMENT Left 2023    Insertion PORT A CATH left internal jugular    THORACOSCOPY  2022    Thoracotomy surgery for right lower lobectomy, mediastinal lymph node sampling;    THORACOTOMY  10/25/2022    Right VATS, decortication, rigt pleural biopsy, bronchoscopy    THORACOTOMY  2022    Redo right thoracotomy and chest washout    Ultrasound endobonchial   2022    With fine needle aspiration    UVULECTOMY  1999       Family History:    Family History   Problem Relation Age of Onset    Liver Cancer Mother          at age 49    Parkinsonism Father     Bladder Cancer Father         smoker    Hypertension Brother     Cancer Brother         renal       Social History:  Marital Status:   [2]  Social History     Tobacco Use    Smoking status: Former     Current packs/day: 0.00     Average packs/day: 0.5 packs/day for 46.6 years (23.3 ttl pk-yrs)     Types: Cigarettes     Start date: 10/25/1977     Quit date: 6/10/2024     Years since quittin.4    Smokeless tobacco: Never    Tobacco comments:     Last attempted to quit 2022;    Vaping Use    Vaping status: Former   Substance Use Topics    Alcohol use: Yes     Comment: 6-8 beer daily    Drug use: Yes     Types: Marijuana     Comment: daily        Medications:    apixaban ANTICOAGULANT (ELIQUIS) 5 MG tablet  empagliflozin (JARDIANCE) 10 MG TABS tablet  fluticasone-salmeterol (ADVAIR) 100-50 MCG/ACT inhaler  furosemide (LASIX) 20 MG tablet  magnesium oxide (MAG-OX) 400 MG tablet  metoprolol tartrate (LOPRESSOR) 50 MG tablet  rosuvastatin (CRESTOR) 40 MG tablet  spironolactone (ALDACTONE) 25 MG tablet  sulfamethoxazole-trimethoprim (BACTRIM DS) 800-160 MG tablet  acetaminophen (TYLENOL) 325 MG tablet  albuterol  (PROAIR HFA/PROVENTIL HFA/VENTOLIN HFA) 108 (90 Base) MCG/ACT inhaler  varenicline (CHANTIX SAVI) 0.5 MG X 11 & 1 MG X 42 tablet          Review of Systems   Constitutional:  Negative for activity change, chills and fever.   Gastrointestinal:  Negative for abdominal pain and vomiting.   Skin:  Positive for wound.   All other systems reviewed and are negative.      Physical Exam   BP: 131/83  Pulse: 84  Temp: 98.5  F (36.9  C)  Resp: 19  SpO2: 96 %      Physical Exam  Vitals and nursing note reviewed.   Constitutional:       General: He is not in acute distress.     Appearance: Normal appearance. He is not ill-appearing or toxic-appearing.   Skin:     General: Skin is warm and dry.      Capillary Refill: Capillary refill takes less than 2 seconds.      Findings: Abscess and erythema present.          Neurological:      General: No focal deficit present.      Mental Status: He is alert and oriented to person, place, and time.   Psychiatric:         Mood and Affect: Mood normal.         Behavior: Behavior normal.         Thought Content: Thought content normal.         Judgment: Judgment normal.         ED Course        [facility]    PROCEDURE: -Incision/Drainage    Date/Time: 11/15/2024 1:57 PM    Performed by: Ruby Mullins NP  Authorized by: Ruby Mullins NP    Risks, benefits and alternatives discussed.      LOCATION:      Type:  Abscess    Size:  5    Location:  Lower extremity    Lower extremity location:  Buttock    Buttock location:  L buttock    PRE-PROCEDURE DETAILS:     Skin preparation:  Betadine    PROCEDURE TYPE:     Complexity:  Simple    ANESTHESIA (see MAR for exact dosages):     Anesthesia method:  Local infiltration    Local anesthetic:  Lidocaine 2% WITH epi    PROCEDURE DETAILS:     Needle aspiration: no      Incision types:  Single straight    Incision depth:  Dermal    Scalpel blade:  11    Wound management:  Probed and deloculated    Drainage:  Purulent    Drainage amount:   "Copious    Wound treatment:  Wound left open    Packing materials:  1/4 in gauze    Amount 1/4\":  6cm    PROCEDURE    Patient Tolerance:  Patient tolerated the procedure well with no immediate complications        No results found for this or any previous visit (from the past 24 hours).    Medications   lidocaine 2%-EPINEPHrine 1:100,000 injection 10 mL (10 mLs Intradermal $Given 11/15/24 2261)       Assessments & Plan (with Medical Decision Making)     I have reviewed the nursing notes.    I have reviewed the findings, diagnosis, plan and need for follow up with the patient.  Jaxson Ramirez is a 69 year old male who presents to the urgent care with complaints of an abscess to left buttocks. Has had for 1-2 weeks, worse the last few days. He denies fevers, n/v/d, dysuria, and difficulty having bowel movements but states he has not felt the best the last few days. Hx of lung CA, not currently on chemo. Anticoagulated with Eliquis. No recent abx or OTC medications. Has a hx of abscesses, which he does not normally have to come in to have drained.     MDM: vital signs normal, afebrile. Non toxic in appearance with no noted distress. 5cm area of fluctuance with mild surrounding cellulitis to left buttocks. Not near the rectum. Abscess does appear ready to incision and drainage. Apprehensive to make aggressive incision as he is on anticoagulants. Risks and benefits discussed. He is in agreement with plan. Informed consent obtained. Injected with 2% lidocaine with epinephrine. Good anesthesia achieved. 5mm straight incision made with 11 blade with copious purulent drainage. Small amount of packing placed. Wound culture obtained. Bactrim prescribed. General surgery referral placed. Supportive measures and return precautions discussed. He is in agreement with plan.     (L02.31,  L03.317) Cellulitis and abscess of buttock  Plan: Adult Gen Surg  Referral  Bactrim 2 times daily for 10 days. Yogurt or probiotic while " taking.   Tylenol as needed.   Change packing in 2 days.   They will be calling from surgery.   Return with any new or concerning symptoms. Understanding verbalized.       Discharge Medication List as of 11/15/2024  1:35 PM        START taking these medications    Details   sulfamethoxazole-trimethoprim (BACTRIM DS) 800-160 MG tablet Take 1 tablet by mouth 2 times daily for 10 days., Disp-20 tablet, R-0, E-Prescribe             Final diagnoses:   Cellulitis and abscess of buttock       11/15/2024   [facility]       Ruby Mullins, VITA  11/15/24 0136

## 2024-11-20 PROBLEM — D68.69 HYPERCOAGULABLE STATE DUE TO PAROXYSMAL ATRIAL FIBRILLATION (H): Status: ACTIVE | Noted: 2024-11-11

## 2024-11-20 PROBLEM — I48.0 HYPERCOAGULABLE STATE DUE TO PAROXYSMAL ATRIAL FIBRILLATION (H): Status: ACTIVE | Noted: 2024-11-11

## 2024-11-20 LAB
BACTERIA ABSC ANAEROBE+AEROBE CULT: ABNORMAL
BACTERIA ABSC ANAEROBE+AEROBE CULT: ABNORMAL
GRAM STAIN RESULT: ABNORMAL

## 2024-11-21 ENCOUNTER — OFFICE VISIT (OUTPATIENT)
Dept: SURGERY | Facility: OTHER | Age: 69
End: 2024-11-21
Payer: COMMERCIAL

## 2024-11-21 VITALS
BODY MASS INDEX: 22.66 KG/M2 | TEMPERATURE: 98.3 F | OXYGEN SATURATION: 96 % | RESPIRATION RATE: 18 BRPM | WEIGHT: 141 LBS | SYSTOLIC BLOOD PRESSURE: 122 MMHG | HEIGHT: 66 IN | HEART RATE: 72 BPM | DIASTOLIC BLOOD PRESSURE: 76 MMHG

## 2024-11-21 DIAGNOSIS — Z51.89 VISIT FOR WOUND CHECK: Primary | ICD-10-CM

## 2024-11-21 PROCEDURE — G0463 HOSPITAL OUTPT CLINIC VISIT: HCPCS | Performed by: SURGERY

## 2024-11-21 PROCEDURE — 99202 OFFICE O/P NEW SF 15 MIN: CPT | Mod: 24 | Performed by: SURGERY

## 2024-11-21 RX ORDER — EZETIMIBE 10 MG/1
1 TABLET ORAL DAILY
COMMUNITY
Start: 2024-11-21

## 2024-11-21 ASSESSMENT — PAIN SCALES - GENERAL: PAINLEVEL_OUTOF10: NO PAIN (0)

## 2024-11-25 NOTE — PROGRESS NOTES
"[facility] Progress Note    HPI: Comes to clinic for follow up of abscess on buttock. This appears to be a recurrence.       S: he is doing well, pain has gone down significantly, no fevers.     O:   Vitals:  /76 (BP Location: Right arm, Cuff Size: Adult Regular)   Pulse 72   Temp 98.3  F (36.8  C) (Tympanic)   Resp 18   Ht 1.676 m (5' 6\")   Wt 64 kg (141 lb)   SpO2 96%   BMI 22.76 kg/m        Physical Exam:  G: alert oriented, no acute distress   ENT: sclera non-icteric   Pulm: no respiratory distress   CVS: RRR  ABD: there is an area of induration on his left buttock, unable to probe into any pockets.   Ext: WWP     Assessment/Plan:  69 y.o. male with what appears to be a recurrent abscess of his buttock. He was seen by me a few years ago for the same problem. It appears to be likely a cyst that gets infected. He is also going through treatment for lung cancer. Discussed that should come back to clinic in 3 months when induration goes down.     Dennis Small MD     "

## (undated) DEVICE — CANISTER-SUCTION 2000CC

## (undated) DEVICE — TUBING-SUCTION 20FT

## (undated) DEVICE — IRRIGATION-H2O 1000ML

## (undated) DEVICE — CONNECTOR-ERBEFLO 2 PORT